# Patient Record
Sex: MALE | Race: WHITE | Employment: OTHER | URBAN - METROPOLITAN AREA
[De-identification: names, ages, dates, MRNs, and addresses within clinical notes are randomized per-mention and may not be internally consistent; named-entity substitution may affect disease eponyms.]

---

## 2019-02-27 ENCOUNTER — OFFICE VISIT (OUTPATIENT)
Dept: INTERNAL MEDICINE CLINIC | Age: 74
End: 2019-02-27

## 2019-02-27 VITALS
TEMPERATURE: 98.5 F | SYSTOLIC BLOOD PRESSURE: 123 MMHG | DIASTOLIC BLOOD PRESSURE: 77 MMHG | HEIGHT: 73 IN | WEIGHT: 190.13 LBS | BODY MASS INDEX: 25.2 KG/M2 | OXYGEN SATURATION: 96 % | RESPIRATION RATE: 18 BRPM | HEART RATE: 69 BPM

## 2019-02-27 DIAGNOSIS — I48.0 PAROXYSMAL ATRIAL FIBRILLATION (HCC): Primary | ICD-10-CM

## 2019-02-27 DIAGNOSIS — G60.9 IDIOPATHIC PERIPHERAL NEUROPATHY: ICD-10-CM

## 2019-02-27 DIAGNOSIS — Z98.890 HISTORY OF MITRAL VALVE REPAIR: ICD-10-CM

## 2019-02-27 DIAGNOSIS — M72.2 PLANTAR FASCIITIS: ICD-10-CM

## 2019-02-27 DIAGNOSIS — Z79.01 LONG TERM (CURRENT) USE OF ANTICOAGULANTS: ICD-10-CM

## 2019-02-27 DIAGNOSIS — Z86.69 HISTORY OF OBSTRUCTIVE SLEEP APNEA: ICD-10-CM

## 2019-02-27 DIAGNOSIS — F43.21 GRIEF REACTION: ICD-10-CM

## 2019-02-27 DIAGNOSIS — E78.00 HYPERCHOLESTEROLEMIA: ICD-10-CM

## 2019-02-27 DIAGNOSIS — Z12.5 SCREENING PSA (PROSTATE SPECIFIC ANTIGEN): ICD-10-CM

## 2019-02-27 DIAGNOSIS — E53.8 VITAMIN B12 DEFICIENCY: ICD-10-CM

## 2019-02-27 DIAGNOSIS — N40.0 ENLARGED PROSTATE: ICD-10-CM

## 2019-02-27 RX ORDER — TADALAFIL 20 MG/1
20 TABLET ORAL AS NEEDED
COMMUNITY
End: 2019-04-25 | Stop reason: SDUPTHER

## 2019-02-27 RX ORDER — ZINC GLUCONATE 10 MG
300 LOZENGE ORAL DAILY
COMMUNITY

## 2019-02-27 RX ORDER — CYANOCOBALAMIN 1000 UG/ML
1000 INJECTION, SOLUTION INTRAMUSCULAR; SUBCUTANEOUS
COMMUNITY
End: 2019-02-27 | Stop reason: SDUPTHER

## 2019-02-27 RX ORDER — CYANOCOBALAMIN 1000 UG/ML
1000 INJECTION, SOLUTION INTRAMUSCULAR; SUBCUTANEOUS
Qty: 10 ML | Refills: 4 | Status: SHIPPED | OUTPATIENT
Start: 2019-02-27 | End: 2019-04-25

## 2019-02-27 RX ORDER — APIXABAN 5 MG/1
5 TABLET, FILM COATED ORAL 2 TIMES DAILY
Refills: 2 | COMMUNITY
Start: 2019-01-03 | End: 2019-09-07 | Stop reason: SDUPTHER

## 2019-02-27 NOTE — PATIENT INSTRUCTIONS
Body Mass Index: Care Instructions Your Care Instructions Body mass index (BMI) can help you see if your weight is raising your risk for health problems. It uses a formula to compare how much you weigh with how tall you are. · A BMI lower than 18.5 is considered underweight. · A BMI between 18.5 and 24.9 is considered healthy. · A BMI between 25 and 29.9 is considered overweight. A BMI of 30 or higher is considered obese. If your BMI is in the normal range, it means that you have a lower risk for weight-related health problems. If your BMI is in the overweight or obese range, you may be at increased risk for weight-related health problems, such as high blood pressure, heart disease, stroke, arthritis or joint pain, and diabetes. If your BMI is in the underweight range, you may be at increased risk for health problems such as fatigue, lower protection (immunity) against illness, muscle loss, bone loss, hair loss, and hormone problems. BMI is just one measure of your risk for weight-related health problems. You may be at higher risk for health problems if you are not active, you eat an unhealthy diet, or you drink too much alcohol or use tobacco products. Follow-up care is a key part of your treatment and safety. Be sure to make and go to all appointments, and call your doctor if you are having problems. It's also a good idea to know your test results and keep a list of the medicines you take. How can you care for yourself at home? · Practice healthy eating habits. This includes eating plenty of fruits, vegetables, whole grains, lean protein, and low-fat dairy. · If your doctor recommends it, get more exercise. Walking is a good choice. Bit by bit, increase the amount you walk every day. Try for at least 30 minutes on most days of the week. · Do not smoke. Smoking can increase your risk for health problems.  If you need help quitting, talk to your doctor about stop-smoking programs and medicines. These can increase your chances of quitting for good. · Limit alcohol to 2 drinks a day for men and 1 drink a day for women. Too much alcohol can cause health problems. If you have a BMI higher than 25 · Your doctor may do other tests to check your risk for weight-related health problems. This may include measuring the distance around your waist. A waist measurement of more than 40 inches in men or 35 inches in women can increase the risk of weight-related health problems. · Talk with your doctor about steps you can take to stay healthy or improve your health. You may need to make lifestyle changes to lose weight and stay healthy, such as changing your diet and getting regular exercise. If you have a BMI lower than 18.5 · Your doctor may do other tests to check your risk for health problems. · Talk with your doctor about steps you can take to stay healthy or improve your health. You may need to make lifestyle changes to gain or maintain weight and stay healthy, such as getting more healthy foods in your diet and doing exercises to build muscle. Where can you learn more? Go to http://gareth-norman.info/. Enter S176 in the search box to learn more about \"Body Mass Index: Care Instructions. \" Current as of: October 13, 2016 Content Version: 11.4 © 2305-8645 [employer]wise[employer]. Care instructions adapted under license by [employer] (which disclaims liability or warranty for this information). If you have questions about a medical condition or this instruction, always ask your healthcare professional. [address] any warranty or liability for your use of this information.

## 2019-02-27 NOTE — PROGRESS NOTES
HISTORY OF PRESENT ILLNESS New patient to my practice, referred to me by self, internet. Prior medical care has been from Dr. Bonita Crane. Has 2 daughters, had 1 son (murdered 2017). 2nd wife Sneha Perkins. Hx [id number], then worked for [employer]. Then worked for [employer] and then [employer]. .  his  past medical history was reviewed, discussed, and summarized in the list below. Review of Systems All other systems reviewed and are negative, except as noted in HPI Past Medical and Surgical History Past Medical History:  
Diagnosis Date  Atrial fibrillation ([address] 07/29/2013 Holter 12/9/13  DDD (degenerative disc disease), lumbar  Diverticulosis  ED (erectile dysfunction) 04/08/2014  Enlarged prostate 2014  
 psa 1.02 9/2017  Grief reaction   
 son was murdered 2017  History of mitral valve repair 2013  
 echo 11/2017 EF 50-55% mildly increased valvue pressure 11/2017  History of obstructive sleep apnea   
 states resolved after mitral replacement 2013  Hypercholesterolemia 04/08/2014  Hyperplastic colon polyp 2013  Idiopathic peripheral neuropathy   
 did not tolerate lyrica, cymbalta. tried Nutrix  Long term (current) use of anticoagulants  Plantar fasciitis  Vitamin B12 deficiency   
 injections  
 
 
 has a past surgical history that includes hx orthopaedic (2009); hx mitral valvuloplasty (2013); hx colonoscopy (04/03/2013); hx lumbar fusion (1984); hx colonoscopy (04/30/2018); and hx heart catheterization (03/05/2013). Current Outpatient Medications Medication Sig  ELIQUIS 5 mg tablet Take 5 mg by mouth two (2) times a day.  B-complex with vitamin C (BEC-ZINC PO) Take  by mouth daily.  magnesium 250 mg tab Take 300 mg by mouth daily.  tadalafil (CIALIS) 20 mg tablet Take 20 mg by mouth as needed.   
 cyanocobalamin (VITAMIN B12) 1,000 mcg/mL injection 1 mL by IntraMUSCular route every thirty (30) days. Please dispense 10 mg vial  
 
No current facility-administered medications for this visit. reports that  has never smoked. he has never used smokeless tobacco. He reports that he drinks about 4.2 oz of alcohol per week. He reports that he does not use drugs. family history is not on file. Physical Exam  
Nursing note and vitals reviewed. Blood pressure 123/77, pulse 69, temperature 98.5 °F (36.9 °C), temperature source Oral, resp. rate 18, height 6' 1\" (1.854 m), weight 190 lb 2 oz (86.2 kg), SpO2 96 %. Constitutional: oriented to person, place, and time. No distress. Eyes: Conjunctivae are normal.  
HEENT:  No cervical lymphadenopathy. No thyroid nodules or goiter Cardiovascular: Normal rate. Regular rhythm, no murmurs, rubs. No edema Pulmonary/Chest: Effort normal. clear to auscultation Abdominal: soft, non-tender, non-distended Musculoskeletal:    
Neurological: Alert and oriented to person, place. Cranial nerves grossly intact. Normal gait Skin: No rash noted. Psychiatric: Normal mood and affect. Behavior is normal.  
 
ASSESSMENT and PLAN Diagnoses and all orders for this visit: 1. Paroxysmal atrial fibrillation (HCC) Records from cardiology to be scanned in [facility]. Doing well. -     CBC WITH AUTOMATED DIFF 
-     METABOLIC PANEL, COMPREHENSIVE 
-     REFERRAL TO CARDIOLOGY 2. Idiopathic peripheral neuropathy 
-     REFERRAL TO PODIATRY 3. Hypercholesterolemia Controlled on current regimen. Continue current medications as written in chart. 
-     LIPID PANEL 4. Enlarged prostate Mild s/s 5. Grief reaction Loss of son 2 years ago. Has supportive family 6. History of mitral valve repair - last echo 2017 showed worsening gradient. Currently asymptomatic 
-     REFERRAL TO CARDIOLOGY 7. Long term (current) use of anticoagulants 8. History of obstructive sleep apnea - may have mild residual s/s. Consider repeat sleep study 9. Vitamin B12 deficiency - tolerating injections. -     VITAMIN B12 & FOLATE 
-     cyanocobalamin (VITAMIN B12) 1,000 mcg/mL injection; 1 mL by IntraMUSCular route every thirty (30) days. Please dispense 10 mg vial 
 
10. Screening PSA (prostate specific antigen) -     PSA W/ REFLX FREE PSA 11. Plantar fasciitis 
-     REFERRAL TO PODIATRY There are no Patient Instructions on file for this visit. 
 
lab results and schedule of future lab studies reviewed with patient 
reviewed medications and side effects in detail Follow-up Disposition: Not on File Discussed the patient's BMI with him. The BMI follow up plan is as follows:  
 
dietary management education, guidance, and counseling 
encourage exercise 
monitor weight 
prescribed dietary intake An After Visit Summary was printed and given to the patient.

## 2019-03-01 ENCOUNTER — HOSPITAL ENCOUNTER (OUTPATIENT)
Dept: LAB | Age: 74
Discharge: HOME | End: 2019-03-01
Payer: MEDICARE

## 2019-03-01 PROCEDURE — 80053 COMPREHEN METABOLIC PANEL: CPT

## 2019-03-01 PROCEDURE — 36415 COLL VENOUS BLD VENIPUNCTURE: CPT

## 2019-03-01 PROCEDURE — 82607 VITAMIN B-12: CPT

## 2019-03-01 PROCEDURE — 85025 COMPLETE CBC W/AUTO DIFF WBC: CPT

## 2019-03-01 PROCEDURE — 80061 LIPID PANEL: CPT

## 2019-03-01 PROCEDURE — 84153 ASSAY OF PSA TOTAL: CPT

## 2019-03-02 LAB
ALBUMIN SERPL-MCNC: 4.4 G/DL (ref 3.5–4.8)
ALBUMIN/GLOB SERPL: 1.9 {RATIO} (ref 1.2–2.2)
ALP SERPL-CCNC: 76 IU/L (ref 39–117)
ALT SERPL-CCNC: 22 IU/L (ref 0–44)
AST SERPL-CCNC: 29 IU/L (ref 0–40)
BASOPHILS # BLD AUTO: 0.1 X10E3/UL (ref 0–0.2)
BASOPHILS NFR BLD AUTO: 1 %
BILIRUB SERPL-MCNC: 0.8 MG/DL (ref 0–1.2)
BUN SERPL-MCNC: 20 MG/DL (ref 8–27)
BUN/CREAT SERPL: 23 (ref 10–24)
CALCIUM SERPL-MCNC: 9.1 MG/DL (ref 8.6–10.2)
CHLORIDE SERPL-SCNC: 105 MMOL/L (ref 96–106)
CHOLEST SERPL-MCNC: 159 MG/DL (ref 100–199)
CO2 SERPL-SCNC: 22 MMOL/L (ref 20–29)
CREAT SERPL-MCNC: 0.86 MG/DL (ref 0.76–1.27)
EOSINOPHIL # BLD AUTO: 0.1 X10E3/UL (ref 0–0.4)
EOSINOPHIL NFR BLD AUTO: 2 %
ERYTHROCYTE [DISTWIDTH] IN BLOOD BY AUTOMATED COUNT: 14 % (ref 12.3–15.4)
FOLATE SERPL-MCNC: >20 NG/ML
GLOBULIN SER CALC-MCNC: 2.3 G/DL (ref 1.5–4.5)
GLUCOSE SERPL-MCNC: 97 MG/DL (ref 65–99)
HCT VFR BLD AUTO: 39.2 % (ref 37.5–51)
HDLC SERPL-MCNC: 55 MG/DL
HGB BLD-MCNC: 13.2 G/DL (ref 13–17.7)
IMM GRANULOCYTES # BLD AUTO: 0 X10E3/UL (ref 0–0.1)
IMM GRANULOCYTES NFR BLD AUTO: 0 %
INTERPRETATION, 910389: NORMAL
LDLC SERPL CALC-MCNC: 94 MG/DL (ref 0–99)
LYMPHOCYTES # BLD AUTO: 1.2 X10E3/UL (ref 0.7–3.1)
LYMPHOCYTES NFR BLD AUTO: 23 %
MCH RBC QN AUTO: 32 PG (ref 26.6–33)
MCHC RBC AUTO-ENTMCNC: 33.7 G/DL (ref 31.5–35.7)
MCV RBC AUTO: 95 FL (ref 79–97)
MONOCYTES # BLD AUTO: 0.4 X10E3/UL (ref 0.1–0.9)
MONOCYTES NFR BLD AUTO: 8 %
NEUTROPHILS # BLD AUTO: 3.4 X10E3/UL (ref 1.4–7)
NEUTROPHILS NFR BLD AUTO: 66 %
PLATELET # BLD AUTO: 183 X10E3/UL (ref 150–379)
POTASSIUM SERPL-SCNC: 4.5 MMOL/L (ref 3.5–5.2)
PROT SERPL-MCNC: 6.7 G/DL (ref 6–8.5)
PSA SERPL-MCNC: 1.1 NG/ML (ref 0–4)
RBC # BLD AUTO: 4.13 X10E6/UL (ref 4.14–5.8)
REFLEX CRITERIA: NORMAL
SODIUM SERPL-SCNC: 144 MMOL/L (ref 134–144)
TRIGL SERPL-MCNC: 49 MG/DL (ref 0–149)
VIT B12 SERPL-MCNC: 879 PG/ML (ref 232–1245)
VLDLC SERPL CALC-MCNC: 10 MG/DL (ref 5–40)
WBC # BLD AUTO: 5.1 X10E3/UL (ref 3.4–10.8)

## 2019-03-19 ENCOUNTER — OFFICE VISIT (OUTPATIENT)
Dept: CARDIOLOGY CLINIC | Age: 74
End: 2019-03-19

## 2019-03-19 VITALS
HEART RATE: 70 BPM | OXYGEN SATURATION: 98 % | RESPIRATION RATE: 17 BRPM | HEIGHT: 73 IN | BODY MASS INDEX: 25.34 KG/M2 | SYSTOLIC BLOOD PRESSURE: 130 MMHG | WEIGHT: 191.2 LBS | DIASTOLIC BLOOD PRESSURE: 80 MMHG

## 2019-03-19 DIAGNOSIS — N52.9 ERECTILE DYSFUNCTION, UNSPECIFIED ERECTILE DYSFUNCTION TYPE: ICD-10-CM

## 2019-03-19 DIAGNOSIS — E78.00 HYPERCHOLESTEROLEMIA: ICD-10-CM

## 2019-03-19 DIAGNOSIS — I48.20 CHRONIC ATRIAL FIBRILLATION (HCC): Primary | ICD-10-CM

## 2019-03-19 DIAGNOSIS — I34.1 MITRAL VALVE PROLAPSE: ICD-10-CM

## 2019-03-19 DIAGNOSIS — I34.0 NON-RHEUMATIC MITRAL REGURGITATION: ICD-10-CM

## 2019-03-19 NOTE — PROGRESS NOTES
Visit Vitals /80 (BP 1 Location: Right arm, BP Patient Position: Sitting) Pulse 70 Resp 17 Ht 6' 1\" (1.854 m) Wt 191 lb 3.2 oz (86.7 kg) SpO2 98% BMI 25.23 kg/m²

## 2019-03-19 NOTE — PROGRESS NOTES
Chief Complaint Patient presents with  New Patient [address] [place] [address] 1. Have you been to the ER, urgent care clinic since your last visit? Hospitalized since your last visit? No 
 
2. Have you seen or consulted any other health care providers outside of the [address] [place] [address] since your last visit? Include any pap smears or colon screening. No 
 
3) Do you have an Advance Directive on file? no 
 
Patient is accompanied by self I have received verbal consent from Ciro Dewey to discuss any/all medical information while they are present in the room.

## 2019-03-19 NOTE — PROGRESS NOTES
Swapnil Carter     1945       Alba Harrison MD, [facility] Date of Visit-3/19/2019 PCP is Thierry Lazaro MD  
[address] [place] [address] [facility] [place] HPI:  Swapnil Carter is a 68 y.o. male Saw Caroline Lao MD on 2-27-19 with hx of AFib, , normal ef in 2017 Prior MV repair Has records today Subjective:  Previously taken care of in [place], [place] by [address] [facility], saw Dr. Lencho Huber. Visit from 09/14/16 indicates patient has chronic atrial fibrillation with moderate MR and history of mitral valve repair. He had a TERI, mitral  leaflets with severe posterior mitral valve prolapse and probably flail middle scallop with moderate to severe eccentric MR and moderate TR. The left atrium was moderately dilated as of 03/05/13. He then underwent surgery on 04/15/13 by Dr. Gabby Ballard. He had a mitral valve repair with a #32 [address] [place] with a right quadrangular repair of posterior leaflet. He had a follow up echo 08/06/13, which showed an EF of 45%, mild cardiomyopathy, normal mitral valve ring. Follow up echo 09/16/? showed an EF 50-55%, mild MR and annuloplasty was intact. He has chronic atrial fibrillation. Holter monitor showed frequent PVCs 03/07/16. He is now seeing Dr. Cristian Meadows as his primary physician. There are further notes from Dr. Rosana Johnson up through 09/20/17 office visit. Patient actually lived in [place], [place] and got his care there, in addition to the center based in [place]. He was living in [place] until his son passed. He has recently gotten the shingles vaccine and flu vaccine. EKG: afib at 62 with Left axis deviation Assessment/Plan: 1. Chronic atrial fibrillation. We have discussed Eliquis. He continues. I agree with this choice. His rate is well controlled as a result of the mitral valve disease.  
2. Mitral valve regurgitation with mitral valve prolapse, status post repair. Will check echo to serve as baseline. Remains asymptomatic. 3. Dyslipidemia, not currently on statin. Will check lipids 4. ED, on Cialis. Plan:  Very pleasant gentleman. It has been a pleasure to meet him today. We had a scott conversation. He says he had a stress test before and his LDL was 94, so I am comfortable with him not being on a statin at this time. His main risk factor is age. He does not recall having any cath prior to repair, just a stress test.  I will see him in six months. Call with results of echo due this month and we will probably see him on a yearly basis. There is a notation in the surgical history that he may have normal coronaries, so I will look further in the records to confirm that. That will also be another argument that he does not necessarily have to start a statin with that LDL value, especially as he is physically active, working on farm, [employer]. Does very high level of physical activity without angina or chest pain. Future Appointments Date Time Provider Tejas Garrett 4/25/2019 10:30 AM Elva Lazaro MD [address] [id number]  
9/26/2019  1:00 PM Crissy Mccall MD [facility] Patient Instructions You will be scheduled for an echocardiogram and a 6 month follow up with Dr. Chet Gonzalez.  
  
Key CAD CHF Meds ELIQUIS 5 mg tablet (Taking) Take 5 mg by mouth two (2) times a day. Medical History:  Other medical history includes BPH, history of  pneumonia, neuropathy, dyslipidemia, ED, BPH and sleep apnea. Past Surgical History:  Surgical history includes back surgery and shoulder surgery in [place] and then the mitral valve annuloplasty and colonoscopies. Social History:  Never smoked. Drinks one drink a night Moved to [place] as his son was murdered in the city and he wanted to be closer to family. He has a daughter in [place] and two daughters here in [place]. Impression: 1. Chronic atrial fibrillation ([facility] 2. Non-rheumatic mitral regurgitation 3. Mitral valve prolapse 4. Hypercholesterolemia 5. Erectile dysfunction, unspecified erectile dysfunction type Cardiac History: No specialty comments available. 12 System ROS:  He was negative for all findings except for those noted above. ROS-except as noted above. . A complete cardiac and respiratory are reviewed and negative except as above ; Resp-denies wheezing  or productive cough,. Const- No unusual weight loss or fever; Neuro-no recent seizure or CVA ; GI- No BRBPR, abdom pain, bloating ; - no  hematuria  
see supplement sheet, initialed and to be scanned by staff Past Medical History:  
Diagnosis Date  Atrial fibrillation ([facility] 07/29/2013 Holter 12/9/13  DDD (degenerative disc disease), lumbar  Diverticulosis  ED (erectile dysfunction) 04/08/2014  Enlarged prostate 2014  
 psa 1.02 9/2017  Grief reaction   
 son was murdered 2017  History of mitral valve repair 2013  
 echo 11/2017 EF 50-55% mildly increased valvue pressure 11/2017  History of obstructive sleep apnea   
 states resolved after mitral replacement 2013  Hypercholesterolemia 04/08/2014  Hyperplastic colon polyp 2013  Idiopathic peripheral neuropathy   
 did not tolerate lyrica, cymbalta. tried Nutrix  Long term (current) use of anticoagulants  Plantar fasciitis  Vitamin B12 deficiency   
 injections Social Hx= reports that  has never smoked. he has never used smokeless tobacco. He reports that he drinks about 4.2 oz of alcohol per week. He reports that he does not use drugs. Exam and Labs:   
Visit Vitals /80 (BP 1 Location: Right arm, BP Patient Position: Sitting) Pulse 70 Resp 17 Ht 6' 1\" (1.854 m) Wt 191 lb 3.2 oz (86.7 kg) SpO2 98% BMI 25.23 kg/m² @Constitutional:  NAD, comfortable Head: NC,AT. Eyes: No scleral icterus. Neck:  Neck supple.  No JVD present. Throat: moist mucous membranes. Chest: Effort normal & normal respiratory excursion . Neurological: alert, conversant and oriented . Skin: Skin is not cold. No obvious systemic rash noted. Not diaphoretic. No erythema. Psychiatric:  Grossly normal mood and affect. Behavior appears normal. Extremities:  no clubbing or cyanosis. Abdomen: non distended Lungs:breath sounds normal. No stridor. distress, wheezes or  Rales. Heart:    no murmurs noted, no gallops noted, irregularly irregular rhythm with rate 62, no JVD , PMI non displaced. Edema: Edema is none. Lab Results Component Value Date/Time Cholesterol, total 159 03/01/2019 08:56 AM  
 HDL Cholesterol 55 03/01/2019 08:56 AM  
 LDL, calculated 94 03/01/2019 08:56 AM  
 Triglyceride 49 03/01/2019 08:56 AM  
 
Lab Results Component Value Date/Time Sodium 144 03/01/2019 08:56 AM  
 Potassium 4.5 03/01/2019 08:56 AM  
 Chloride 105 03/01/2019 08:56 AM  
 CO2 22 03/01/2019 08:56 AM  
 Glucose 97 03/01/2019 08:56 AM  
 BUN 20 03/01/2019 08:56 AM  
 Creatinine 0.86 03/01/2019 08:56 AM  
 BUN/Creatinine ratio 23 03/01/2019 08:56 AM  
 GFR est  03/01/2019 08:56 AM  
 GFR est non-AA 87 03/01/2019 08:56 AM  
 Calcium 9.1 03/01/2019 08:56 AM  
  
Wt Readings from Last 3 Encounters:  
03/19/19 191 lb 3.2 oz (86.7 kg) 02/27/19 190 lb 2 oz (86.2 kg) BP Readings from Last 3 Encounters:  
03/19/19 130/80  
02/27/19 123/77 Current Outpatient Medications Medication Sig  ELIQUIS 5 mg tablet Take 5 mg by mouth two (2) times a day.  B-complex with vitamin C (BEC-ZINC PO) Take  by mouth daily.  magnesium 250 mg tab Take 300 mg by mouth daily.  tadalafil (CIALIS) 20 mg tablet Take 20 mg by mouth as needed.  cyanocobalamin (VITAMIN B12) 1,000 mcg/mL injection 1 mL by IntraMUSCular route every thirty (30) days. Please dispense 10 mg vial  
 
No current facility-administered medications for this visit. Impression see above.

## 2019-03-19 NOTE — PROGRESS NOTES
Chief Complaint Patient presents with  New Patient [facility] 1. Have you been to the ER, urgent care clinic since your last visit? Hospitalized since your last visit? No 
 
2. Have you seen or consulted any other health care providers outside of the [address] [place] [address] since your last visit? Include any pap smears or colon screening. No 
 
4) Are you interested in receiving information on Advance Directives? NO Patient is accompanied by self I have received verbal consent from Chuck Miller to discuss any/all medical information while they are present in the room.

## 2019-04-04 ENCOUNTER — TELEPHONE (OUTPATIENT)
Dept: CARDIOLOGY CLINIC | Age: 74
End: 2019-04-04

## 2019-04-04 NOTE — TELEPHONE ENCOUNTER
Two patient identifiers verified. Per MD patient called and given results of ECHO. Confirmed follow up appointment. Patient verbalized understanding and denies any further questions or concerns at this time.      ----- Message from Edgardo Zapata MD sent at 4/4/2019  9:48 AM EDT -----  Echo looks fine  Nurse to call pt for test result   Minor regurg  Valve repair in good shape  Has good LV fx  Future Appointments  4/25/2019  10:30 AM   Stefano Lazaro MD           [address] [place] [address]  9/26/2019  1:00 PM    Brigitte Sanders MD       [address] [address]

## 2019-04-25 ENCOUNTER — OFFICE VISIT (OUTPATIENT)
Dept: INTERNAL MEDICINE CLINIC | Age: 74
End: 2019-04-25

## 2019-04-25 VITALS
RESPIRATION RATE: 18 BRPM | HEART RATE: 51 BPM | HEIGHT: 73 IN | SYSTOLIC BLOOD PRESSURE: 131 MMHG | DIASTOLIC BLOOD PRESSURE: 79 MMHG | OXYGEN SATURATION: 96 % | BODY MASS INDEX: 25.99 KG/M2 | TEMPERATURE: 98.5 F | WEIGHT: 196.13 LBS

## 2019-04-25 DIAGNOSIS — E53.8 VITAMIN B12 DEFICIENCY: ICD-10-CM

## 2019-04-25 DIAGNOSIS — Z00.00 MEDICARE ANNUAL WELLNESS VISIT, SUBSEQUENT: Primary | ICD-10-CM

## 2019-04-25 DIAGNOSIS — N52.9 ERECTILE DYSFUNCTION, UNSPECIFIED ERECTILE DYSFUNCTION TYPE: ICD-10-CM

## 2019-04-25 DIAGNOSIS — Z13.31 SCREENING FOR DEPRESSION: ICD-10-CM

## 2019-04-25 DIAGNOSIS — Z71.89 ADVANCED DIRECTIVES, COUNSELING/DISCUSSION: ICD-10-CM

## 2019-04-25 DIAGNOSIS — Z13.39 SCREENING FOR ALCOHOLISM: ICD-10-CM

## 2019-04-25 DIAGNOSIS — F43.9 SITUATIONAL STRESS: ICD-10-CM

## 2019-04-25 RX ORDER — ALPHA LIPOIC ACID 300 MG
CAPSULE ORAL
Refills: 0 | COMMUNITY
Start: 2019-04-10 | End: 2020-04-16 | Stop reason: ALTCHOICE

## 2019-04-25 RX ORDER — TADALAFIL 20 MG/1
20 TABLET ORAL AS NEEDED
Qty: 10 TAB | Refills: 5 | Status: SHIPPED | OUTPATIENT
Start: 2019-04-25

## 2019-04-25 RX ORDER — CYANOCOBALAMIN 1000 UG/ML
1000 INJECTION, SOLUTION INTRAMUSCULAR; SUBCUTANEOUS
Qty: 10 ML | Refills: 4 | Status: CANCELLED
Start: 2019-04-25

## 2019-04-25 RX ORDER — CYANOCOBALAMIN 1000 UG/ML
1000 INJECTION, SOLUTION INTRAMUSCULAR; SUBCUTANEOUS
Qty: 10 ML | Refills: 11 | Status: SHIPPED | OUTPATIENT
Start: 2019-04-25 | End: 2020-08-23

## 2019-04-25 NOTE — PROGRESS NOTES
This is the Subsequent Medicare Annual Wellness Exam, performed 12 months or more after the Initial AWV or the last Subsequent AWV I have reviewed the patient's medical history in detail and updated the computerized patient record. History Past Medical History:  
Diagnosis Date  Atrial fibrillation ([address] 07/29/2013 Holter 12/9/13  DDD (degenerative disc disease), lumbar  Diverticulosis  ED (erectile dysfunction) 04/08/2014  Enlarged prostate 2014  
 psa 1.02 9/2017  Grief reaction   
 son was murdered 2017  History of mitral valve repair 2013  
 echo 11/2017 EF 50-55% mildly increased valvue pressure 11/2017  History of obstructive sleep apnea   
 states resolved after mitral replacement 2013  Hypercholesterolemia 04/08/2014  Hyperplastic colon polyp 2013  Idiopathic peripheral neuropathy   
 did not tolerate lyrica, cymbalta. tried Nutrix  Long term (current) use of anticoagulants  Plantar fasciitis  Vitamin B12 deficiency   
 injections Past Surgical History:  
Procedure Laterality Date  HX COLONOSCOPY  04/03/2013  
 2 hyperplastic polyps  HX COLONOSCOPY  04/30/2018 2 polyps. Dr. Jeb Rey  HX HEART CATHETERIZATION  03/05/2013 [place].  normal coronaries, severe MR  
 HX LUMBAR FUSION  1984 L5-S1  
 HX MITRAL VALVULOPLASTY  2013 Mitral Valve Annuloplasty  HX ORTHOPAEDIC  2009 Shoulder Current Outpatient Medications Medication Sig Dispense Refill  Alpha Lipoic Acid 300 mg cap TK UTD  0  
 tadalafil (CIALIS) 20 mg tablet Take 1 Tab by mouth as needed (ED). 10 Tab 5  cyanocobalamin (VITAMIN B12) 1,000 mcg/mL injection 1 mL by IntraMUSCular route every thirty (30) days. Please dispense 10 mg vial  Indications: inadequate vitamin B12 10 mL 11  
 ELIQUIS 5 mg tablet Take 5 mg by mouth two (2) times a day. 2  
 B-complex with vitamin C (BEC-ZINC PO) Take  by mouth daily.  magnesium 250 mg tab Take 300 mg by mouth daily. Allergies Allergen Reactions  Beta Blocker [Beta-Blockers (Beta-Adrenergic Blocking Agts)] Nausea and Vomiting No family history on file. Social History Tobacco Use  Smoking status: Never Smoker  Smokeless tobacco: Never Used Substance Use Topics  Alcohol use: Yes Alcohol/week: 4.2 oz Types: 7 Shots of liquor per week Patient Active Problem List  
Diagnosis Code  Atrial fibrillation (HCC) I48.91  
 DDD (degenerative disc disease), lumbar M51.36  
 Diverticulosis K57.90  
 ED (erectile dysfunction) N52.9  Enlarged prostate N40.0  Grief reaction F43.21  
 History of mitral valve repair Z98.890  
 History of obstructive sleep apnea Z86.69  
 Hypercholesterolemia E78.00  Hyperplastic colon polyp K63.5  Idiopathic peripheral neuropathy G60.9  Long term (current) use of anticoagulants Z79.01  
 Plantar fasciitis M72.2  Vitamin B12 deficiency E53.8 Depression Risk Factor Screening:  
 
3 most recent PHQ Screens 2/27/2019 Little interest or pleasure in doing things Not at all Feeling down, depressed, irritable, or hopeless Several days Total Score PHQ 2 1 Alcohol Risk Factor Screening: You do not drink alcohol or very rarely. Functional Ability and Level of Safety:  
Hearing Loss Hearing is good. Activities of Daily Living The home contains: no safety equipment. Patient does total self care Fall Risk Fall Risk Assessment, last 12 mths 4/25/2019 Able to walk? Yes Fall in past 12 months? No  
 
 
Abuse Screen Patient is not abused Cognitive Screening Evaluation of Cognitive Function: 
Has your family/caregiver stated any concerns about your memory: no 
Normal 
 
Patient Care Team  
Patient Care Team: 
Veronica Monreal MD as PCP - General (Internal Medicine) Assessment/Plan Education and counseling provided: 
Are appropriate based on today's review and evaluation Diagnoses and all orders for this visit: 
 
1. Medicare annual wellness visit, subsequent 2. Advanced directives, counseling/discussion 3. Screening for alcoholism 4. Screening for depression 
-     Angelalandanalisa Simmons 5. Vitamin B12 deficiency Controlled on current regimen. Continue current medications as written in chart. -     cyanocobalamin (VITAMIN B12) 1,000 mcg/mL injection; 1 mL by IntraMUSCular route every thirty (30) days. Please dispense 10 mg vial  Indications: inadequate vitamin B12 
 
6. Situational stress- son was murdered in 2017. Stress re: this and his relationship w wife -     REFERRAL TO PSYCHOLOGY 7. Erectile dysfunction, unspecified erectile dysfunction type 
-     tadalafil (CIALIS) 20 mg tablet; Take 1 Tab by mouth as needed (ED). Health Maintenance Due Topic Date Due  GLAUCOMA SCREENING Q2Y  04/22/2011

## 2019-04-25 NOTE — ACP (ADVANCE CARE PLANNING)
Advance Care Planning (ACP) Provider Note - Comprehensive     Date of ACP Conversation: 04/27/19  Persons included in Conversation:  patient  Length of ACP Conversation in minutes:  <16 minutes (Non-Billable)    Authorized Decision Maker (if patient is incapable of making informed decisions): This person is:  Healthcare Agent/Medical Power of  under Advance Directive          General ACP for ALL Patients with Decision Making Capacity:   Importance of advance care planning, including choosing a healthcare agent to communicate patient's healthcare decisions if patient lost the ability to make decisions, such as after a sudden illness or accident  Understanding of the healthcare agent role was assessed and information provided  Exploration of values, goals, and preferences if recovery is not expected, even with continued medical treatment in the event of: Imminent death  Severe, permanent brain injury    Review of Existing Advance Directive:  not availble     For Serious or Chronic Illness:  Understanding of medical condition    Understanding of CPR, goals and expected outcomes, benefits and burdens discussed. Information on CPR success rates provided (e.g. for CPR in hospital, survival to d/c at two weeks is 22%, for chronically ill or elderly/frail survival is less than 3%); Individual asked to communicate understanding of information in his/her own words.   Explored fears and concerns regarding CPR or possible outcomes    Interventions Provided:  Recommended completion of Advance Directive form after review of ACP materials and conversation with prospective healthcare agent   Recommended communicating the plan and making copies for the healthcare agent, personal physician, and others as appropriate (e.g., health system)

## 2019-04-25 NOTE — PATIENT INSTRUCTIONS
Medicare Wellness Visit, Male The best way to live healthy is to have a lifestyle where you eat a well-balanced diet, exercise regularly, limit alcohol use, and quit all forms of tobacco/nicotine, if applicable. Regular preventive services are another way to keep healthy. Preventive services (vaccines, screening tests, monitoring & exams) can help personalize your care plan, which helps you manage your own care. Screening tests can find health problems at the earliest stages, when they are easiest to treat. [facility] follows the current, evidence-based guidelines published by the [facility] Armando Deborah ([facility]) when recommending preventive services for our patients. Because we follow these guidelines, sometimes recommendations change over time as research supports it. (For example, a prostate screening blood test is no longer routinely recommended for men with no symptoms.) Of course, you and your doctor may decide to screen more often for some diseases, based on your risk and co-morbidities (chronic disease you are already diagnosed with). Preventive services for you include: - Medicare offers their members a free annual wellness visit, which is time for you and your primary care provider to discuss and plan for your preventive service needs. Take advantage of this benefit every year! 
-All adults over age 72 should receive the recommended pneumonia vaccines. Current USPSTF guidelines recommend a series of two vaccines for the best pneumonia protection.  
-All adults should have a flu vaccine yearly and an ECG.  All adults age 61 and older should receive a shingles vaccine once in their lifetime.   
-All adults age 38-68 who are overweight should have a diabetes screening test once every three years.  
-Other screening tests & preventive services for persons with diabetes include: an eye exam to screen for diabetic retinopathy, a kidney function test, a foot exam, and stricter control over your cholesterol.  
-Cardiovascular screening for adults with routine risk involves an electrocardiogram (ECG) at intervals determined by the provider.  
-Colorectal cancer screening should be done for adults age 54-65 with no increased risk factors for colorectal cancer. There are a number of acceptable methods of screening for this type of cancer. Each test has its own benefits and drawbacks. Discuss with your provider what is most appropriate for you during your annual wellness visit. The different tests include: colonoscopy (considered the best screening method), a fecal occult blood test, a fecal DNA test, and sigmoidoscopy. 
-All adults born between [facility] should be screened once for Hepatitis C. 
-An Abdominal Aortic Aneurysm (AAA) Screening is recommended for men age 73-68 who has ever smoked in their lifetime. Here is a list of your current Health Maintenance items (your personalized list of preventive services) with a due date: 
Health Maintenance Due Topic Date Due  Glaucoma Screening   04/22/2011

## 2019-07-12 ENCOUNTER — TELEPHONE (OUTPATIENT)
Dept: CARDIOLOGY CLINIC | Age: 74
End: 2019-07-12

## 2019-07-12 NOTE — TELEPHONE ENCOUNTER
Verified patient with two types of identifiers. Patient reports heavy lifting and strenuous activity yesterday. He then reports that last night around midnight he had upper back pain from shoulder to shoulder as well as chest pain when taking deep breaths. He reports all symptoms have since subsided. Informed him per Dr. Ovidio Lugo to go to the ER if pain returns, to stop any strenuous activity and to follow up with Dr. Parvez Lambert next week. Patient verbalized understanding and will call with any other questions.

## 2019-07-15 ENCOUNTER — HOSPITAL ENCOUNTER (OUTPATIENT)
Dept: LAB | Age: 74
Discharge: HOME | End: 2019-07-15
Payer: MEDICARE

## 2019-07-15 ENCOUNTER — OFFICE VISIT (OUTPATIENT)
Dept: CARDIOLOGY CLINIC | Age: 74
End: 2019-07-15

## 2019-07-15 VITALS
WEIGHT: 193 LBS | DIASTOLIC BLOOD PRESSURE: 82 MMHG | OXYGEN SATURATION: 96 % | HEART RATE: 70 BPM | BODY MASS INDEX: 25.58 KG/M2 | HEIGHT: 73 IN | SYSTOLIC BLOOD PRESSURE: 120 MMHG | RESPIRATION RATE: 14 BRPM

## 2019-07-15 DIAGNOSIS — I34.0 NON-RHEUMATIC MITRAL REGURGITATION: ICD-10-CM

## 2019-07-15 DIAGNOSIS — I48.20 CHRONIC ATRIAL FIBRILLATION (HCC): ICD-10-CM

## 2019-07-15 DIAGNOSIS — E78.00 HYPERCHOLESTEROLEMIA: ICD-10-CM

## 2019-07-15 DIAGNOSIS — I42.8 VALVULAR CARDIOMYOPATHY (HCC): ICD-10-CM

## 2019-07-15 DIAGNOSIS — Z98.890 S/P MVR (MITRAL VALVE REPAIR): ICD-10-CM

## 2019-07-15 DIAGNOSIS — R07.2 SUBSTERNAL CHEST PAIN: Primary | ICD-10-CM

## 2019-07-15 DIAGNOSIS — I34.1 MITRAL VALVE PROLAPSE: ICD-10-CM

## 2019-07-15 DIAGNOSIS — R07.89 CHEST TIGHTNESS: ICD-10-CM

## 2019-07-15 PROCEDURE — 80178 ASSAY OF LITHIUM: CPT

## 2019-07-15 PROCEDURE — 36415 COLL VENOUS BLD VENIPUNCTURE: CPT

## 2019-07-15 NOTE — PROGRESS NOTES
Gillian Acosta     1945       Alba Veloz MD, [facility] [place]  Date of Visit-7/15/2019   PCP is Kyaw Bowman MD   [facility] [place]  [facility]  HPI:  Gillian Acosta is a 69 y.o. male   Four month f/u for chronic a-fib and he has prior mitral valve repair. Previously taken care of in [place], [place] by [address] [place] [address], saw Dr. Archie Hood. Visit from 16 indicates patient has chronic atrial fibrillation with moderate MR and history of mitral valve repair. He had a TERI, mitral  leaflets with severe posterior mitral valve prolapse and probably flail middle scallop with moderate to severe eccentric MR and moderate TR. The left atrium was moderately dilated as of 13. He then underwent surgery on 04/15/13 by Dr. Steffanie Pierre. He had a mitral valve repair with a #32 [place] with a right quadrangular repair of posterior leaflet. He had a follow up echo 13, which showed an EF of 45%, mild cardiomyopathy, normal mitral valve ring. Follow up echo  showed an EF 50-55%, mild MR and annuloplasty was intact. He has chronic atrial fibrillation. Holter monitor showed frequent PVCs 16. On Thursday night he felt as if \"someone put a band around his chest and back\". Reports having slight SOB that night. The feeling lasted from 11:35 AM to 2:00 PM. He mentions similarity of that feeling to what he felt in . By Saturday, he was feeling normal again. He went digging a ditch over the weekend, without any chest pain but profuse sweating. He notes having muscle cramps in his lower extremities. Family Hx: father  at 80 due to head trauma. Denies edema, syncope. Has no tachycardia , palpitations or sense of arrythmia     EKG 7/15/2019:    Atrial fibrillation  - occasional ectopic ventricular beat     -Left axis -anterior fascicular block.    -Possible old anteroseptal infarct. Assessment/Plan:     1.  Substernal chest pain  Episode of chest tightness. I will get a stress test and troponin.   - TROPONIN I  - NUCLEAR CARDIAC STRESS TEST; Future    2. Chronic atrial fibrillation (HCC)  No palps, tachy, on Eliquis    3. Chest tightness-see above  4. Non-rheumatic mitral regurgitation due to MVP  5. Mitral valve prolapse-compensated    6. Hypercholesterolemia  Lab Results   Component Value Date/Time    LDL, calculated 94 03/01/2019 08:56 AM      7. S/P MVR (mitral valve repair)  8. Valvular cardiomyopathy (HCC)-note reduced EF  Future Appointments   Date Time Provider Tejas Garrett   9/26/2019  1:00 PM Melodye Cogan, MD [facility]   10/22/2019 10:30 AM Gutierrez Lazaro Ala, MD [facility]             ELIQUIS 5 mg tablet (Taking) Take 5 mg by mouth two (2) times a day. Impression:   1. Substernal chest pain    2. Chronic atrial fibrillation (HCC)    3. Chest tightness    4. Non-rheumatic mitral regurgitation    5. Mitral valve prolapse    6. Hypercholesterolemia    7. S/P MVR (mitral valve repair)    8. Valvular cardiomyopathy [facility])       Cardiac History:   No specialty comments available. ROS-except as noted above. . A complete cardiac and respiratory are reviewed and negative except as above ; Resp-denies wheezing  or productive cough,. Const- No unusual weight loss or fever; Neuro-no recent seizure or CVA ; GI- No BRBPR, abdom pain, bloating ; - no  hematuria   see supplement sheet, initialed and to be scanned by staff  Past Medical History:   Diagnosis Date    Atrial fibrillation ([facility] [address] 07/29/2013    Holter 12/9/13    DDD (degenerative disc disease), lumbar     Diverticulosis     ED (erectile dysfunction) 04/08/2014    Enlarged prostate 2014    psa 1.02 9/2017    Grief reaction     son was murdered 2017    History of mitral valve repair 2013    echo 3/2019 stabkle.   EF 50-55% mildly increased valvue pressure 11/2017    History of obstructive sleep apnea     states resolved after mitral replacement 2013    Hypercholesterolemia 04/08/2014    Hyperplastic colon polyp 2013    Idiopathic peripheral neuropathy     did not tolerate lyrica, cymbalta. tried Nutrix    Long term (current) use of anticoagulants     Plantar fasciitis     Vitamin B12 deficiency     injections      Social Hx= reports that he has never smoked. He has never used smokeless tobacco. He reports that he drinks about 4.2 oz of alcohol per week. He reports that he does not use drugs. Exam and Labs:    Visit Vitals  /82 (BP 1 Location: Left arm, BP Patient Position: Sitting)   Pulse 70   Resp 14   Ht 6' 1\" (1.854 m)   Wt 193 lb (87.5 kg)   SpO2 96%   BMI 25.46 kg/m²    @Constitutional:  NAD, comfortable  Head: NC,AT. Eyes: No scleral icterus. Neck:  Neck supple. No JVD present. Throat: moist mucous membranes. Chest: Effort normal & normal respiratory excursion . Neurological: alert, conversant and oriented . Skin: Skin is not cold. No obvious systemic rash noted. Not diaphoretic. No erythema. Psychiatric:  Grossly normal mood and affect. Behavior appears normal. Extremities:  no clubbing or cyanosis. Abdomen: non distended    Lungs:  breath sounds normal. No stridor. distress, wheezes or  Rales. Heart:  normal rate, regular rhythm, normal S1, S2, no murmurs, rubs, clicks or gallops, PMI non displaced. Edema:  Edema is none.   Lab Results   Component Value Date/Time    Cholesterol, total 159 03/01/2019 08:56 AM    HDL Cholesterol 55 03/01/2019 08:56 AM    LDL, calculated 94 03/01/2019 08:56 AM    Triglyceride 49 03/01/2019 08:56 AM     Lab Results   Component Value Date/Time    Sodium 144 03/01/2019 08:56 AM    Potassium 4.5 03/01/2019 08:56 AM    Chloride 105 03/01/2019 08:56 AM    CO2 22 03/01/2019 08:56 AM    Glucose 97 03/01/2019 08:56 AM    BUN 20 03/01/2019 08:56 AM    Creatinine 0.86 03/01/2019 08:56 AM    BUN/Creatinine ratio 23 03/01/2019 08:56 AM    GFR est  03/01/2019 08:56 AM GFR est non-AA 87 2019 08:56 AM    Calcium 9.1 2019 08:56 AM      Wt Readings from Last 3 Encounters:   07/15/19 193 lb (87.5 kg)   19 196 lb 2 oz (89 kg)   19 191 lb (86.6 kg)      BP Readings from Last 3 Encounters:   07/15/19 120/82   19 131/79   19 130/80      Current Outpatient Medications   Medication Sig    Alpha Lipoic Acid 300 mg cap TK UTD    tadalafil (CIALIS) 20 mg tablet Take 1 Tab by mouth as needed (ED).  cyanocobalamin (VITAMIN B12) 1,000 mcg/mL injection 1 mL by IntraMUSCular route every thirty (30) days. Please dispense 10 mg vial  Indications: inadequate vitamin B12    ELIQUIS 5 mg tablet Take 5 mg by mouth two (2) times a day.  B-complex with vitamin C (BEC-ZINC PO) Take  by mouth daily.  magnesium 250 mg tab Take 300 mg by mouth daily. No current facility-administered medications for this visit. Impression see above.      Written by Declan Franklin, as dictated by Fanny Meredith MD.

## 2019-07-15 NOTE — Clinical Note
7/15/19 Patient: Lenora Medina YOB: 1945 Date of Visit: 7/15/2019 Ashley Borja MD 
[place] [address] [address] [facility] [address] VIA In Basket Dear Ashley Borja MD, Thank you for referring Mr. Aisha Ennis to [address] for evaluation. My notes for this consultation are attached. If you have questions, please do not hesitate to call me. I look forward to following your patient along with you.  
 
 
Sincerely, 
 
Iram Kelley MD

## 2019-07-15 NOTE — PATIENT INSTRUCTIONS
You will be scheduled for nuclear stress testing after your appointment today. Wear comfortable clothing (shorts or pants with a shirt or blouse- no underwire bras) and walking or athletic shoes. Do not eat or drink anything, except water, for at least 2 hours prior to your appointment. Avoid tobacco products for at least 6 hours prior to your test.    Do not eat or drink anything containing caffeine, including but not limited to the following: chocolate, regular and decaffeinated coffee, soft drinks, or tea for at least 12-24 hours prior to your test.    Do not hold your scheduled medications prior to your test. If you are a diabetic, please ask your physician how to adjust your food and insulin prior to your test. Please bring all medications you are currently taking. Your test will be performed on a 1 day protocol. This is determined by your height, weight, and other risk factors. For a 2 day test, please allow for 2 hours in the office each day. For a 1 day test, please allow for 4 hours in the office that day. The radioactive isotope used for your testing is different from any of the dyes that are commonly used in x-ray procedures, and is ordered specially for your test. Please call to cancel or reschedule your appointment at least 24 hours prior to your scheduled appointment to avoid being billed for the expensive isotope.

## 2019-07-16 LAB — TROPONIN I SERPL-MCNC: 0.02 NG/ML (ref 0–0.04)

## 2019-08-21 ENCOUNTER — TELEPHONE (OUTPATIENT)
Dept: CARDIOLOGY CLINIC | Age: 74
End: 2019-08-21

## 2019-08-21 NOTE — TELEPHONE ENCOUNTER
Please call  Mandie Anderson at [phone]. He'd like to know what he can take for back pain.      Thank you, Paul Medina

## 2019-08-23 NOTE — TELEPHONE ENCOUNTER
Verified patient with two types of identifiers. Patient reports Dr. Stephanie Cazares advised him to not take Aleve in the past. Patient will try acetaminophen.

## 2019-10-01 ENCOUNTER — TELEPHONE (OUTPATIENT)
Dept: INTERNAL MEDICINE CLINIC | Age: 74
End: 2019-10-01

## 2019-10-01 NOTE — TELEPHONE ENCOUNTER
----- Message from [place] sent at 10/1/2019  4:05 PM EDT -----  Regarding: Dr. Young Nailer: [phone]  Appointment Request From: Dung Hale    With Provider: Bharath Mendosa MD [-Primary Care Physician-]    Preferred Date Range: From 10/1/2019 To 10/11/2019    Preferred Times: Any    Reason: To address the following health maintenance concerns.   Glaucoma Screening Q2y    Comments:  Have seen Dr. Lazarus Li at [facility] at [address] the past

## 2019-10-10 ENCOUNTER — OFFICE VISIT (OUTPATIENT)
Dept: CARDIOLOGY CLINIC | Age: 74
End: 2019-10-10

## 2019-10-10 VITALS
HEART RATE: 57 BPM | SYSTOLIC BLOOD PRESSURE: 112 MMHG | WEIGHT: 196.6 LBS | OXYGEN SATURATION: 97 % | BODY MASS INDEX: 26.06 KG/M2 | DIASTOLIC BLOOD PRESSURE: 73 MMHG | HEIGHT: 73 IN | RESPIRATION RATE: 16 BRPM

## 2019-10-10 DIAGNOSIS — I48.0 PAROXYSMAL ATRIAL FIBRILLATION (HCC): ICD-10-CM

## 2019-10-10 DIAGNOSIS — I42.8 VALVULAR CARDIOMYOPATHY (HCC): ICD-10-CM

## 2019-10-10 DIAGNOSIS — Z98.890 S/P MVR (MITRAL VALVE REPAIR): ICD-10-CM

## 2019-10-10 DIAGNOSIS — I48.20 CHRONIC ATRIAL FIBRILLATION (HCC): Primary | ICD-10-CM

## 2019-10-10 DIAGNOSIS — I34.0 NON-RHEUMATIC MITRAL REGURGITATION: ICD-10-CM

## 2019-10-10 DIAGNOSIS — I34.1 MITRAL VALVE PROLAPSE: ICD-10-CM

## 2019-10-10 DIAGNOSIS — E78.00 HYPERCHOLESTEROLEMIA: ICD-10-CM

## 2019-10-10 NOTE — Clinical Note
10/10/19 Patient: Lynn Manjula YOB: 1945 Date of Visit: 10/10/2019 Edmund Escudero MD 
[place] [address] [address] [facility] [id number] VIA In Basket Dear Edmund Escudero MD, Thank you for referring Mr. Johney Severance to [address] for evaluation. My notes for this consultation are attached. If you have questions, please do not hesitate to call me. I look forward to following your patient along with you.  
 
 
Sincerely, 
 
Keith Wong MD

## 2019-10-10 NOTE — PROGRESS NOTES
Annalisa Grande     1945       Alba Stacy MD, [facility] [place]  Date of Visit-10/10/2019   PCP is Clary Bernard MD   [facility] [place]  [facility]  HPI:  Annalisa Grande is a 69 y.o. male   3 month f/u of   chronic a-fib and he has prior mitral valve repair. Previously taken care of in [place], [place] by [address] [place] [address], saw Dr. Angela Benitez from 09/14/16 indicates patient has chronic atrial fibrillation with moderate MR and history of mitral valve repair. Rosy Alonso had a TERI, mitral  leaflets with severe posterior mitral valve prolapse and probably flail middle scallop with moderate to severe eccentric MR and moderate TR.  The left atrium was moderately dilated as of 03/05/13. Rosy Alonso then underwent surgery on 04/15/13 by Dr. Wen Rooney.      He had a mitral valve repair with a #32 [place] with a right quadrangular repair of posterior leaflet. Rosy Alonso had a follow up echo 08/06/13, which showed an EF of 45%, mild cardiomyopathy, normal mitral valve ring.  Follow up echo 09/16/? showed an EF 50-55%, mild MR and annuloplasty was intact.  He has chronic atrial fibrillation.  Holter monitor showed frequent PVCs 03/07/16  nuclear stress test 7/24/19, normal blood flow with an EF of 56%.   03/20/19   ECHO ADULT COMPLETE 04/01/2019 4/1/2019    Narrative · Calculated left ventricular ejection fraction is 59%. Biplane method   used to measure ejection fraction. Left ventricular mild concentric   hypertrophy. No regional wall motion abnormality noted. · Left atrial cavity size is severely dilated. · Right atrial cavity size is severely dilated. · Mild aortic valve regurgitation is present. · Mitral valve repaired with annuloplasty ring. Trace mitral valve   regurgitation. · Mild tricuspid valve regurgitation is present. There is no evidence of   pulmonary hypertension. · Mild pulmonic valve regurgitation is present.         Signed by: Joslyn Kiran MD      Overall the pt states he is doing well. He is no longer having chest tightness. Denies chest pain, edema, syncope or shortness of breath at rest, has no tachycardia, palpitations or sense of arrhythmia. He is active except being bothered by neuropathy    Assessment/Plan:     1. Chronic atrial fibrillation  He is asymptomatic and feeling well, chest pain resolved, EF is normal.  Had CP which has resolved  Rate fine, on Eliquis   2. Non-rheumatic mitral regurgitation- s/p repair, has trace MR now  3. Mitral valve prolapse  Stable post MV repair  4. Hypercholesterolemia  LDL 94 on no meds, fu  [facility]  5. S/P MVR (mitral valve repair)  6. Valvular cardiomyopathy (HCC)-EF reduced due to MR/MVP now normal  Follow up in 6 months  Future Appointments   Date Time Provider Tejas Garrett   10/22/2019 10:40 AM Jarrod Hernández MD [address]   4/16/2020 10:00 AM Mable Lazaro MD [address]   4/16/2020  1:00 PM Guillermo Mccartney MD [facility]         Impression:   1. Chronic atrial fibrillation    2. Non-rheumatic mitral regurgitation    3. Mitral valve prolapse    4. Hypercholesterolemia    5. S/P MVR (mitral valve repair)    6. Valvular cardiomyopathy [facility])       Cardiac History:   No specialty comments available. ROS-except as noted above. . A complete cardiac and respiratory are reviewed and negative except as above ; Resp-denies wheezing  or productive cough,. Const- No unusual weight loss or fever; Neuro-no recent seizure or CVA ; GI- No BRBPR, abdom pain, bloating ; - no  hematuria   see supplement sheet, initialed and to be scanned by staff  Past Medical History:   Diagnosis Date    Atrial fibrillation ([address] 07/29/2013    Holter 12/9/13    DDD (degenerative disc disease), lumbar     Diverticulosis     ED (erectile dysfunction) 04/08/2014    Enlarged prostate 2014    psa 1.02 9/2017    Grief reaction     son was murdered 2017    History of mitral valve repair 2013    echo 3/2019 stabkle.   EF 50-55% mildly increased valvue pressure 11/2017    History of obstructive sleep apnea     states resolved after mitral replacement 2013    Hypercholesterolemia 04/08/2014    Hyperplastic colon polyp 2013    Idiopathic peripheral neuropathy     did not tolerate lyrica, cymbalta. tried Nutrix    Long term (current) use of anticoagulants     Plantar fasciitis     Vitamin B12 deficiency     injections      Social Hx= reports that he has never smoked. He has never used smokeless tobacco. He reports that he drinks about 7.0 standard drinks of alcohol per week. He reports that he does not use drugs. Exam and Labs:  /73 (BP 1 Location: Left arm, BP Patient Position: Sitting)   Pulse (!) 57   Resp 16   Ht 6' 1\" (1.854 m)   Wt 196 lb 9.6 oz (89.2 kg)   SpO2 97%   BMI 25.94 kg/m² Constitutional:  NAD, comfortable  Head: NC,AT. Eyes: No scleral icterus. Neck:  Neck supple. No JVD present. Throat: moist mucous membranes. Chest: Effort normal & normal respiratory excursion . Neurological: alert, conversant and oriented . Skin: Skin is not cold. No obvious systemic rash noted. Not diaphoretic. No erythema. Psychiatric:  Grossly normal mood and affect. Behavior appears normal. Extremities:  no clubbing or cyanosis. Abdomen: non distended    Lungs:breath sounds normal. No stridor. distress, wheezes or  Rales. Heart: IRIR,  normal S1, S2, no murmurs, rubs, clicks or gallops , PMI non displaced. Edema: Edema is none.   Lab Results   Component Value Date/Time    Cholesterol, total 159 03/01/2019 08:56 AM    HDL Cholesterol 55 03/01/2019 08:56 AM    LDL, calculated 94 03/01/2019 08:56 AM    Triglyceride 49 03/01/2019 08:56 AM     Lab Results   Component Value Date/Time    Sodium 144 03/01/2019 08:56 AM    Potassium 4.5 03/01/2019 08:56 AM    Chloride 105 03/01/2019 08:56 AM    CO2 22 03/01/2019 08:56 AM    Glucose 97 03/01/2019 08:56 AM    BUN 20 03/01/2019 08:56 AM    Creatinine 0.86 03/01/2019 08:56 AM BUN/Creatinine ratio 23 03/01/2019 08:56 AM    GFR est  03/01/2019 08:56 AM    GFR est non-AA 87 03/01/2019 08:56 AM    Calcium 9.1 03/01/2019 08:56 AM      Wt Readings from Last 3 Encounters:   10/10/19 196 lb 9.6 oz (89.2 kg)   07/24/19 193 lb (87.5 kg)   07/15/19 193 lb (87.5 kg)      BP Readings from Last 3 Encounters:   10/10/19 112/73   07/15/19 120/82   04/25/19 131/79      Current Outpatient Medications   Medication Sig    ELIQUIS 5 mg tablet TAKE 1 TABLET(5 MG) BY MOUTH TWICE DAILY    Alpha Lipoic Acid 300 mg cap TK UTD    tadalafil (CIALIS) 20 mg tablet Take 1 Tab by mouth as needed (ED).  cyanocobalamin (VITAMIN B12) 1,000 mcg/mL injection 1 mL by IntraMUSCular route every thirty (30) days. Please dispense 10 mg vial  Indications: inadequate vitamin B12    B-complex with vitamin C (BEC-ZINC PO) Take  by mouth daily.  magnesium 250 mg tab Take 300 mg by mouth daily. No current facility-administered medications for this visit. Impression see above.       Written by Deni Blunt, as dictated by Natalya Gross MD.

## 2019-10-10 NOTE — TELEPHONE ENCOUNTER
Requested Prescriptions     Signed Prescriptions Disp Refills    apixaban (ELIQUIS) 5 mg tablet 56 Tab 0     Sig: TAKE 1 TABLET(5 MG) BY MOUTH TWICE DAILY     Authorizing Provider: Christopher Gonsalez     Ordering User: Peggy Cleaning     Per verbal orders

## 2019-10-11 ENCOUNTER — OFFICE VISIT (OUTPATIENT)
Dept: INTERNAL MEDICINE CLINIC | Age: 74
End: 2019-10-11

## 2019-10-11 VITALS
DIASTOLIC BLOOD PRESSURE: 75 MMHG | OXYGEN SATURATION: 96 % | HEIGHT: 73 IN | RESPIRATION RATE: 18 BRPM | BODY MASS INDEX: 26.11 KG/M2 | TEMPERATURE: 98 F | WEIGHT: 197 LBS | SYSTOLIC BLOOD PRESSURE: 119 MMHG | HEART RATE: 67 BPM

## 2019-10-11 DIAGNOSIS — I48.0 PAROXYSMAL ATRIAL FIBRILLATION (HCC): Primary | ICD-10-CM

## 2019-10-11 DIAGNOSIS — F43.9 SITUATIONAL STRESS: ICD-10-CM

## 2019-10-11 DIAGNOSIS — Z13.5 SCREENING FOR GLAUCOMA: ICD-10-CM

## 2019-10-11 DIAGNOSIS — H35.9 RETINAL DISEASE: ICD-10-CM

## 2019-10-11 DIAGNOSIS — N40.0 ENLARGED PROSTATE: ICD-10-CM

## 2019-10-11 NOTE — PROGRESS NOTES
HISTORY OF PRESENT ILLNESS    Chief Complaint   Patient presents with    Other     pt wants eye exam, prostate exam and regular cardio f/u, needs eliquis samples becuase it's too expensive       Presents for follow-up    He is doing fairly well, but wants to update me on his care and ask for referrals if needed. He is supporting his wife who has adenocarcinoma of the lung and is undergoing chemotherapy. He is overdue for retinal exam.  He has seen ophthalmology Dr. Saad Field in the past and did have laser therapy for a retinal condition in the last couple of years. He did not know he could just make a follow-up without asking. Asks about his prostate. History of mildly enlarged prostate. PSA has been stable. No exam since 2017. Lab Results   Component Value Date/Time    Prostate Specific Ag 1.1 03/01/2019 08:56 AM     He is seeing cardiology Dr. Ramez Mathis for atrial fibrillation. Was seen on October 10.  recommended to continue Eliquis and was given some samples since he is in the donut hole. Nuclear stress test on July 24 was stable. He was having some chest pressure at that time. Review of Systems   All other systems reviewed and are negative, except as noted in HPI    Past Medical and Surgical History   has a past medical history of Atrial fibrillation ([facility] [address] (07/29/2013), DDD (degenerative disc disease), lumbar, Diverticulosis, ED (erectile dysfunction) (04/08/2014), Enlarged prostate (2014), Grief reaction, History of mitral valve repair (2013), History of obstructive sleep apnea, Hypercholesterolemia (04/08/2014), Hyperplastic colon polyp (2013), Idiopathic peripheral neuropathy, Long term (current) use of anticoagulants, Plantar fasciitis, Retinal disease, and Vitamin B12 deficiency.    has a past surgical history that includes hx orthopaedic (2009); hx mitral valvuloplasty (2013); hx colonoscopy (04/03/2013); hx lumbar fusion (1984); hx colonoscopy (04/30/2018); and hx heart catheterization (03/05/2013). reports that he has never smoked. He has never used smokeless tobacco. He reports that he drinks about 7.0 standard drinks of alcohol per week. He reports that he does not use drugs. family history is not on file. Physical Exam   Nursing note and vitals reviewed. Blood pressure 119/75, pulse 67, temperature 98 °F (36.7 °C), temperature source Oral, resp. rate 18, height 6' 1\" (1.854 m), weight 197 lb (89.4 kg), SpO2 96 %. Constitutional:  No distress. Eyes: Conjunctivae are normal.   Ears:  Hearing grossly intact  Cardiovascular: Normal rate. regular rhythm, no murmurs or gallops  No edema  Pulmonary/Chest: Effort normal.   CTAB  Musculoskeletal: moves all 4 extremities   Neurological: Alert and oriented to person, place, and time. Skin: No rash noted. Psychiatric: Normal mood and affect. Behavior is normal.     ASSESSMENT and PLAN  Diagnoses and all orders for this visit:    1. Paroxysmal atrial fibrillation (HCC)  Stable per cardiology. He has samples of Eliquis. He will return to them for further samples if needed. 2. Screening for glaucoma  3. Retinal disease  Recommend follow-up   -     REFERRAL TO OPHTHALMOLOGY    4. Prostate enlargement  Offered rectal exam, but he defers and will wait till his physical in April. PSA has been stable. Offered reassurance    5. Situational anxiety  Offered my support for him and his family as his wife undergoes cancer therapy. His son was murdered in 2017    Patient Instructions          Body Mass Index: Care Instructions  Your Care Instructions    Body mass index (BMI) can help you see if your weight is raising your risk for health problems. It uses a formula to compare how much you weigh with how tall you are. · A BMI lower than 18.5 is considered underweight. · A BMI between 18.5 and 24.9 is considered healthy. · A BMI between 25 and 29.9 is considered overweight. A BMI of 30 or higher is considered obese.   If your BMI is in the normal range, it means that you have a lower risk for weight-related health problems. If your BMI is in the overweight or obese range, you may be at increased risk for weight-related health problems, such as high blood pressure, heart disease, stroke, arthritis or joint pain, and diabetes. If your BMI is in the underweight range, you may be at increased risk for health problems such as fatigue, lower protection (immunity) against illness, muscle loss, bone loss, hair loss, and hormone problems. BMI is just one measure of your risk for weight-related health problems. You may be at higher risk for health problems if you are not active, you eat an unhealthy diet, or you drink too much alcohol or use tobacco products. Follow-up care is a key part of your treatment and safety. Be sure to make and go to all appointments, and call your doctor if you are having problems. It's also a good idea to know your test results and keep a list of the medicines you take. How can you care for yourself at home? · Practice healthy eating habits. This includes eating plenty of fruits, vegetables, whole grains, lean protein, and low-fat dairy. · If your doctor recommends it, get more exercise. Walking is a good choice. Bit by bit, increase the amount you walk every day. Try for at least 30 minutes on most days of the week. · Do not smoke. Smoking can increase your risk for health problems. If you need help quitting, talk to your doctor about stop-smoking programs and medicines. These can increase your chances of quitting for good. · Limit alcohol to 2 drinks a day for men and 1 drink a day for women. Too much alcohol can cause health problems. If you have a BMI higher than 25  · Your doctor may do other tests to check your risk for weight-related health problems.  This may include measuring the distance around your waist. A waist measurement of more than 40 inches in men or 35 inches in women can increase the risk of weight-related health problems. · Talk with your doctor about steps you can take to stay healthy or improve your health. You may need to make lifestyle changes to lose weight and stay healthy, such as changing your diet and getting regular exercise. If you have a BMI lower than 18.5  · Your doctor may do other tests to check your risk for health problems. · Talk with your doctor about steps you can take to stay healthy or improve your health. You may need to make lifestyle changes to gain or maintain weight and stay healthy, such as getting more healthy foods in your diet and doing exercises to build muscle. Where can you learn more? Go to http://gareth-norman.info/. Enter S176 in the search box to learn more about \"Body Mass Index: Care Instructions. \"  Current as of: October 13, 2016  Content Version: 11.4  © 9139-1490 [employer]. Care instructions adapted under license by [employer] (which disclaims liability or warranty for this information). If you have questions about a medical condition or this instruction, always ask your healthcare professional. [place] [address] any warranty or liability for your use of this information. lab results and schedule of future lab studies reviewed with patient  reviewed medications and side effects in detail    Return to clinic for further evaluation if new symptoms develop        Current Outpatient Medications   Medication Sig    apixaban (ELIQUIS) 5 mg tablet TAKE 1 TABLET(5 MG) BY MOUTH TWICE DAILY    Alpha Lipoic Acid 300 mg cap TK UTD    tadalafil (CIALIS) 20 mg tablet Take 1 Tab by mouth as needed (ED).  cyanocobalamin (VITAMIN B12) 1,000 mcg/mL injection 1 mL by IntraMUSCular route every thirty (30) days. Please dispense 10 mg vial  Indications: inadequate vitamin B12    B-complex with vitamin C (BEC-ZINC PO) Take  by mouth daily.  magnesium 250 mg tab Take 300 mg by mouth daily.      No current facility-administered medications for this visit. Discussed the patient's BMI with him. The BMI follow up plan is as follows:     dietary management education, guidance, and counseling  encourage exercise  monitor weight  prescribed dietary intake    An After Visit Summary was printed and given to the patient.

## 2019-10-11 NOTE — PATIENT INSTRUCTIONS
Body Mass Index: Care Instructions Your Care Instructions Body mass index (BMI) can help you see if your weight is raising your risk for health problems. It uses a formula to compare how much you weigh with how tall you are. · A BMI lower than 18.5 is considered underweight. · A BMI between 18.5 and 24.9 is considered healthy. · A BMI between 25 and 29.9 is considered overweight. A BMI of 30 or higher is considered obese. If your BMI is in the normal range, it means that you have a lower risk for weight-related health problems. If your BMI is in the overweight or obese range, you may be at increased risk for weight-related health problems, such as high blood pressure, heart disease, stroke, arthritis or joint pain, and diabetes. If your BMI is in the underweight range, you may be at increased risk for health problems such as fatigue, lower protection (immunity) against illness, muscle loss, bone loss, hair loss, and hormone problems. BMI is just one measure of your risk for weight-related health problems. You may be at higher risk for health problems if you are not active, you eat an unhealthy diet, or you drink too much alcohol or use tobacco products. Follow-up care is a key part of your treatment and safety. Be sure to make and go to all appointments, and call your doctor if you are having problems. It's also a good idea to know your test results and keep a list of the medicines you take. How can you care for yourself at home? · Practice healthy eating habits. This includes eating plenty of fruits, vegetables, whole grains, lean protein, and low-fat dairy. · If your doctor recommends it, get more exercise. Walking is a good choice. Bit by bit, increase the amount you walk every day. Try for at least 30 minutes on most days of the week. · Do not smoke. Smoking can increase your risk for health problems.  If you need help quitting, talk to your doctor about stop-smoking programs and medicines. These can increase your chances of quitting for good. · Limit alcohol to 2 drinks a day for men and 1 drink a day for women. Too much alcohol can cause health problems. If you have a BMI higher than 25 · Your doctor may do other tests to check your risk for weight-related health problems. This may include measuring the distance around your waist. A waist measurement of more than 40 inches in men or 35 inches in women can increase the risk of weight-related health problems. · Talk with your doctor about steps you can take to stay healthy or improve your health. You may need to make lifestyle changes to lose weight and stay healthy, such as changing your diet and getting regular exercise. If you have a BMI lower than 18.5 · Your doctor may do other tests to check your risk for health problems. · Talk with your doctor about steps you can take to stay healthy or improve your health. You may need to make lifestyle changes to gain or maintain weight and stay healthy, such as getting more healthy foods in your diet and doing exercises to build muscle. Where can you learn more? Go to http://gareth-norman.info/. Enter S176 in the search box to learn more about \"Body Mass Index: Care Instructions. \" Current as of: October 13, 2016 Content Version: 11.4 © 7412-8299 [employer]. Care instructions adapted under license by [employer] (which disclaims liability or warranty for this information). If you have questions about a medical condition or this instruction, always ask your healthcare professional. [address] any warranty or liability for your use of this information.

## 2019-11-25 ENCOUNTER — TELEPHONE (OUTPATIENT)
Dept: CARDIOLOGY CLINIC | Age: 74
End: 2019-11-25

## 2019-11-25 DIAGNOSIS — I48.0 PAROXYSMAL ATRIAL FIBRILLATION (HCC): ICD-10-CM

## 2019-11-25 NOTE — TELEPHONE ENCOUNTER
Patient would like to know if he can get samples of Eli[facility] Patient has enough to last him a couple of days. Please advise.      Phone: [phone]

## 2019-11-26 NOTE — TELEPHONE ENCOUNTER
Patient is calling again in regards to his eliquis medication. Please advise.     Phone: [phone]

## 2019-11-26 NOTE — TELEPHONE ENCOUNTER
Verified patient with two types of identifiers. Let patient know he may  samples at his convenience. Patient verbalized understanding and will call with any other questions.

## 2019-12-15 ENCOUNTER — HOSPITAL ENCOUNTER (EMERGENCY)
Age: 74
Discharge: HOME | End: 2019-12-15
Attending: EMERGENCY MEDICINE
Payer: MEDICARE

## 2019-12-15 VITALS
TEMPERATURE: 99.3 F | OXYGEN SATURATION: 99 % | RESPIRATION RATE: 20 BRPM | DIASTOLIC BLOOD PRESSURE: 78 MMHG | BODY MASS INDEX: 25.1 KG/M2 | SYSTOLIC BLOOD PRESSURE: 128 MMHG | WEIGHT: 190.26 LBS | HEART RATE: 82 BPM

## 2019-12-15 DIAGNOSIS — R11.2 NAUSEA VOMITING AND DIARRHEA: Primary | ICD-10-CM

## 2019-12-15 DIAGNOSIS — R19.7 NAUSEA VOMITING AND DIARRHEA: Primary | ICD-10-CM

## 2019-12-15 LAB
ALBUMIN SERPL-MCNC: 4.5 G/DL (ref 3.5–5)
ALBUMIN/GLOB SERPL: 1.2 {RATIO} (ref 1.1–2.2)
ALP SERPL-CCNC: 84 U/L (ref 45–117)
ALT SERPL-CCNC: 30 U/L (ref 12–78)
ANION GAP SERPL CALC-SCNC: 12 MMOL/L (ref 5–15)
APPEARANCE UR: ABNORMAL
AST SERPL-CCNC: 30 U/L (ref 15–37)
BACTERIA URNS QL MICRO: ABNORMAL /HPF
BASOPHILS # BLD: 0 K/UL (ref 0–0.1)
BASOPHILS NFR BLD: 0 % (ref 0–1)
BILIRUB SERPL-MCNC: 1.7 MG/DL (ref 0.2–1)
BILIRUB UR QL CFM: NEGATIVE
BUN SERPL-MCNC: 34 MG/DL (ref 6–20)
BUN/CREAT SERPL: 29 (ref 12–20)
CALCIUM SERPL-MCNC: 9.5 MG/DL (ref 8.5–10.1)
CHLORIDE SERPL-SCNC: 103 MMOL/L (ref 97–108)
CO2 SERPL-SCNC: 25 MMOL/L (ref 21–32)
COLOR UR: ABNORMAL
COMMENT, HOLDF: NORMAL
CREAT SERPL-MCNC: 1.19 MG/DL (ref 0.7–1.3)
DIFFERENTIAL METHOD BLD: ABNORMAL
EOSINOPHIL # BLD: 0 K/UL (ref 0–0.4)
EOSINOPHIL NFR BLD: 0 % (ref 0–7)
EPITH CASTS URNS QL MICRO: ABNORMAL /LPF
ERYTHROCYTE [DISTWIDTH] IN BLOOD BY AUTOMATED COUNT: 13.5 % (ref 11.5–14.5)
GLOBULIN SER CALC-MCNC: 3.9 G/DL (ref 2–4)
GLUCOSE SERPL-MCNC: 134 MG/DL (ref 65–100)
GLUCOSE UR STRIP.AUTO-MCNC: NEGATIVE MG/DL
HCT VFR BLD AUTO: 47.8 % (ref 36.6–50.3)
HGB BLD-MCNC: 15.8 G/DL (ref 12.1–17)
HGB UR QL STRIP: NEGATIVE
HYALINE CASTS URNS QL MICRO: >20 /LPF (ref 0–5)
IMM GRANULOCYTES # BLD AUTO: 0 K/UL (ref 0–0.04)
IMM GRANULOCYTES NFR BLD AUTO: 0 % (ref 0–0.5)
KETONES UR QL STRIP.AUTO: 15 MG/DL
LACTATE BLD-SCNC: 1.77 MMOL/L (ref 0.4–2)
LEUKOCYTE ESTERASE UR QL STRIP.AUTO: NEGATIVE
LIPASE SERPL-CCNC: 69 U/L (ref 73–393)
LYMPHOCYTES # BLD: 0.2 K/UL (ref 0.8–3.5)
LYMPHOCYTES NFR BLD: 3 % (ref 12–49)
MCH RBC QN AUTO: 32 PG (ref 26–34)
MCHC RBC AUTO-ENTMCNC: 33.1 G/DL (ref 30–36.5)
MCV RBC AUTO: 97 FL (ref 80–99)
MONOCYTES # BLD: 0.3 K/UL (ref 0–1)
MONOCYTES NFR BLD: 5 % (ref 5–13)
MUCOUS THREADS URNS QL MICRO: ABNORMAL /LPF
NEUTS BAND NFR BLD MANUAL: 19 %
NEUTS SEG # BLD: 5.9 K/UL (ref 1.8–8)
NEUTS SEG NFR BLD: 73 % (ref 32–75)
NITRITE UR QL STRIP.AUTO: NEGATIVE
PH UR STRIP: 5.5 [PH] (ref 5–8)
PLATELET # BLD AUTO: 190 K/UL (ref 150–400)
PMV BLD AUTO: 12.4 FL (ref 8.9–12.9)
POTASSIUM SERPL-SCNC: 4.5 MMOL/L (ref 3.5–5.1)
PROT SERPL-MCNC: 8.4 G/DL (ref 6.4–8.2)
PROT UR STRIP-MCNC: ABNORMAL MG/DL
RBC # BLD AUTO: 4.93 M/UL (ref 4.1–5.7)
RBC #/AREA URNS HPF: ABNORMAL /HPF (ref 0–5)
RBC MORPH BLD: ABNORMAL
RBC MORPH BLD: ABNORMAL
SAMPLES BEING HELD,HOLD: NORMAL
SODIUM SERPL-SCNC: 140 MMOL/L (ref 136–145)
SP GR UR REFRACTOMETRY: >1.03 (ref 1–1.03)
UROBILINOGEN UR QL STRIP.AUTO: 0.2 EU/DL (ref 0.2–1)
WBC # BLD AUTO: 6.4 K/UL (ref 4.1–11.1)
WBC MORPH BLD: ABNORMAL
WBC URNS QL MICRO: ABNORMAL /HPF (ref 0–4)

## 2019-12-15 PROCEDURE — 80053 COMPREHEN METABOLIC PANEL: CPT

## 2019-12-15 PROCEDURE — 96374 THER/PROPH/DIAG INJ IV PUSH: CPT

## 2019-12-15 PROCEDURE — 83690 ASSAY OF LIPASE: CPT

## 2019-12-15 PROCEDURE — 99284 EMERGENCY DEPT VISIT MOD MDM: CPT

## 2019-12-15 PROCEDURE — 74011250636 HC RX REV CODE- 250/636: Performed by: EMERGENCY MEDICINE

## 2019-12-15 PROCEDURE — 36415 COLL VENOUS BLD VENIPUNCTURE: CPT

## 2019-12-15 PROCEDURE — 83605 ASSAY OF LACTIC ACID: CPT

## 2019-12-15 PROCEDURE — 85025 COMPLETE CBC W/AUTO DIFF WBC: CPT

## 2019-12-15 PROCEDURE — 81001 URINALYSIS AUTO W/SCOPE: CPT

## 2019-12-15 RX ORDER — ONDANSETRON 4 MG/1
4 TABLET, ORALLY DISINTEGRATING ORAL
Qty: 20 TAB | Refills: 0 | Status: SHIPPED | OUTPATIENT
Start: 2019-12-15 | End: 2020-04-16 | Stop reason: ALTCHOICE

## 2019-12-15 RX ORDER — ONDANSETRON 2 MG/ML
4 INJECTION INTRAMUSCULAR; INTRAVENOUS
Status: COMPLETED | OUTPATIENT
Start: 2019-12-15 | End: 2019-12-15

## 2019-12-15 RX ADMIN — SODIUM CHLORIDE 1000 ML: 900 INJECTION, SOLUTION INTRAVENOUS at 20:15

## 2019-12-15 RX ADMIN — SODIUM CHLORIDE 1000 ML: 900 INJECTION, SOLUTION INTRAVENOUS at 21:52

## 2019-12-15 RX ADMIN — ONDANSETRON 4 MG: 2 INJECTION INTRAMUSCULAR; INTRAVENOUS at 20:15

## 2019-12-16 NOTE — ED TRIAGE NOTES
Patient complains of nausea, vomiting and diarrhea. Symptoms started today. He reports being around his grandson on Friday who was experiencing the same symptoms.

## 2019-12-16 NOTE — ED PROVIDER NOTES
Haresh Asher is a 68 yo M with nasuea, vomiting and diarrhea. His symptoms started last night and has continued throughout the day. He has not vomiting in the past 3 hours but he continues to have diarrhea that is yellow and watery. He feels fatigued with dry mouth. His wife has had similar symptoms at the same time and his grandson was ill 2 days ago. Past Medical History:   Diagnosis Date    Atrial fibrillation ([address] 07/29/2013    Holter 12/9/13    DDD (degenerative disc disease), lumbar     Diverticulosis     ED (erectile dysfunction) 04/08/2014    Enlarged prostate 2014    psa 1.02 9/2017    Grief reaction     son was murdered 2017    History of mitral valve repair 2013    echo 3/2019 stabkle. EF 50-55% mildly increased valvue pressure 11/2017    History of obstructive sleep apnea     states resolved after mitral replacement 2013    Hypercholesterolemia 04/08/2014    Hyperplastic colon polyp 2013    Idiopathic peripheral neuropathy     did not tolerate lyrica, cymbalta. tried Nutrix    Long term (current) use of anticoagulants     Plantar fasciitis     Retinal disease     Dr. Rayshawn Rodriguez. hx laser tx    Vitamin B12 deficiency     injections       Past Surgical History:   Procedure Laterality Date    HX COLONOSCOPY  04/03/2013    2 hyperplastic polyps    HX COLONOSCOPY  04/30/2018    2 polyps. Dr. Kristyn Crews  03/05/2013    [place]  normal coronaries, severe MR    HX LUMBAR FUSION  1984    L5-S1    HX MITRAL VALVULOPLASTY  2013    Mitral Valve Annuloplasty    HX ORTHOPAEDIC  2009    Shoulder         History reviewed. No pertinent family history.     Social History     Socioeconomic History    Marital status:      Spouse name: Yojana Wilson Number of children: 3    Years of education: Not on file    Highest education level: Not on file   Occupational History    Not on file   Social Needs    Financial resource strain: Not on file   Jose-Anika insecurity:     Worry: Not on file     Inability: Not on file    Transportation needs:     Medical: Not on file     Non-medical: Not on file   Tobacco Use    Smoking status: Never Smoker    Smokeless tobacco: Never Used   Substance and Sexual Activity    Alcohol use: Yes     Alcohol/week: 7.0 standard drinks     Types: 7 Shots of liquor per week    Drug use: No    Sexual activity: Yes     Partners: Female   Lifestyle    Physical activity:     Days per week: Not on file     Minutes per session: Not on file    Stress: Not on file   Relationships    Social connections:     Talks on phone: Not on file     Gets together: Not on file     Attends Taoism service: Not on file     Active member of club or organization: Not on file     Attends meetings of clubs or organizations: Not on file     Relationship status: Not on file    Intimate partner violence:     Fear of current or ex partner: Not on file     Emotionally abused: Not on file     Physically abused: Not on file     Forced sexual activity: Not on file   Other Topics Concern    Not on file   Social History Narrative    Not on file         ALLERGIES: Beta blocker [beta-blockers (beta-adrenergic blocking agts)]    Review of Systems   Constitutional: Negative for fever. HENT: Negative for sore throat. Eyes: Negative for visual disturbance. Respiratory: Negative for cough. Cardiovascular: Negative for chest pain. Gastrointestinal: Positive for abdominal pain, diarrhea, nausea and vomiting. Genitourinary: Negative for dysuria. Musculoskeletal: Negative for back pain. Skin: Negative for rash. Neurological: Positive for headaches. Vitals:    12/15/19 1923   BP: 137/81   Pulse: (!) 106   Resp: 16   Temp: 99.3 °F (37.4 °C)   SpO2: 97%   Weight: 86.3 kg (190 lb 4.1 oz)            Physical Exam  Vitals signs and nursing note reviewed. Constitutional:       General: He is not in acute distress. Appearance: He is well-developed. HENT:      Head: Normocephalic and atraumatic. Mouth/Throat:      Mouth: Mucous membranes are dry. Eyes:      Conjunctiva/sclera: Conjunctivae normal.   Neck:      Musculoskeletal: Normal range of motion. Trachea: Phonation normal.   Cardiovascular:      Rate and Rhythm: Normal rate. Pulmonary:      Effort: Pulmonary effort is normal. No respiratory distress. Breath sounds: No wheezing. Abdominal:      General: There is no distension. Tenderness: There is no tenderness. There is no guarding. Musculoskeletal: Normal range of motion. General: No tenderness. Skin:     General: Skin is warm and dry. Neurological:      Mental Status: He is alert. He is not disoriented. Motor: No abnormal muscle tone. MDM       10:47 PM  Patient reassessed and is feeling much better. Tolerating PO. Labs reviewed, UA concentrated and creatinine 1.19, receiving 2 L IVNS bolus. Will discharge home with prescription for zofran. Will discharge home with wife and family members who intend to stay with patient and wife tonight.    Procedures

## 2019-12-16 NOTE — ED NOTES
Pt assisted with popsicle. Pt tolerated with no N/V. Purposeful rounding completed. Toileting offered, ongoing plan of care discussed and pts concerns/questions addressed. Pain reassessed. Pt informed of time factors with lab/imaging study results. Pt resting on the stretcher in a position of comfort. Call bell within reach; will continue to monitor.

## 2019-12-16 NOTE — ED NOTES
Discharge note: The patient was discharged home in stable condition, accompanied by family member. The patient is alert and oriented, is in no respiratory distress and has vital signs within normal limits. The patient's diagnosis, condition and treatment were explained to patient by Dr Emery Pierre and reinforced by nurse. The patient party expressed understanding of discharge instructions, prescriptions, and plan of care. A discharge plan has been developed. A  was not involved in the process. Patient offered a wheelchair to ED lob for discharge but declined at this time. Patient ambulatory to ED lobby to go home with family member.

## 2020-03-27 ENCOUNTER — HOSPITAL ENCOUNTER (EMERGENCY)
Age: 75
Discharge: HOME | End: 2020-03-27
Attending: EMERGENCY MEDICINE
Payer: MEDICARE

## 2020-03-27 VITALS
WEIGHT: 183 LBS | DIASTOLIC BLOOD PRESSURE: 73 MMHG | TEMPERATURE: 97.9 F | HEIGHT: 73 IN | OXYGEN SATURATION: 97 % | BODY MASS INDEX: 24.25 KG/M2 | HEART RATE: 56 BPM | SYSTOLIC BLOOD PRESSURE: 126 MMHG | RESPIRATION RATE: 15 BRPM

## 2020-03-27 DIAGNOSIS — Z00.00 WELL ADULT HEALTH CHECK: Primary | ICD-10-CM

## 2020-03-27 PROCEDURE — 99282 EMERGENCY DEPT VISIT SF MDM: CPT

## 2020-03-27 NOTE — ED PROVIDER NOTES
This is a 61-year-old male who presents to the emergency room at the advice of his wife's doctor for testing of the COVID virus. Patient's wife is immunosuppressed and on current therapy for lung cancer. Patient is completely asymptomatic. Denies chest pain, shortness of breath, dizziness, nausea or vomiting, fever or chills. Patient states he has a history of gastritis but no current diarrhea. Has not been around any persons positive for the COVID virus. There are no complaints at this time. Ariana Hickey MD  Past Medical History:  07/29/2013: Atrial fibrillation ([address]      Comment:  Holter 12/9/13  No date: DDD (degenerative disc disease), lumbar  No date: Diverticulosis  04/08/2014: ED (erectile dysfunction)  2014: Enlarged prostate      Comment:  psa 1.02 9/2017  No date: Grief reaction      Comment:  son was murdered 2017 2013: History of mitral valve repair      Comment:  echo 3/2019 stabkle. EF 50-55% mildly increased valvue                pressure 11/2017  No date: History of obstructive sleep apnea      Comment:  states resolved after mitral replacement 2013 04/08/2014: Hypercholesterolemia  2013: Hyperplastic colon polyp  No date: Idiopathic peripheral neuropathy      Comment:  did not tolerate lyrica, cymbalta. tried Nutrix  No date: Long term (current) use of anticoagulants  No date: Plantar fasciitis  No date: Retinal disease      Comment:  Dr. Myla Melendez. hx laser tx  No date: Vitamin B12 deficiency      Comment:  injections  Past Surgical History:   04/03/2013: HX COLONOSCOPY      Comment:  2 hyperplastic polyps  04/30/2018: HX COLONOSCOPY      Comment:  2 polyps.   Dr. Rik Romero  03/05/2013: HX HEART CATHETERIZATION      Comment:  MISSY Almaguer.  normal coronaries, severe MR  1984: HX LUMBAR FUSION      Comment:  L5-S1  2013: HX MITRAL VALVULOPLASTY      Comment:  Mitral Valve Annuloplasty  2009: HX ORTHOPAEDIC      Comment:  Shoulder             Past Medical History:   Diagnosis Date    Atrial [address] ([facility] 07/29/2013    Holter 12/9/13    DDD (degenerative disc disease), lumbar     Diverticulosis     ED (erectile dysfunction) 04/08/2014    Enlarged prostate 2014    psa 1.02 9/2017    Grief reaction     son was murdered 2017    History of mitral valve repair 2013    echo 3/2019 stabkle. EF 50-55% mildly increased valvue pressure 11/2017    History of obstructive sleep apnea     states resolved after mitral replacement 2013    Hypercholesterolemia 04/08/2014    Hyperplastic colon polyp 2013    Idiopathic peripheral neuropathy     did not tolerate lyrica, cymbalta. tried Nutrix    Long term (current) use of anticoagulants     Plantar fasciitis     Retinal disease     Dr. Rebecca Castellanos. hx laser tx    Vitamin B12 deficiency     injections       Past Surgical History:   Procedure Laterality Date    HX COLONOSCOPY  04/03/2013    2 hyperplastic polyps    HX COLONOSCOPY  04/30/2018    2 polyps. Dr. Estelle Baez  03/05/2013    [place]  normal coronaries, severe MR    HX LUMBAR FUSION  1984    L5-S1    HX MITRAL VALVULOPLASTY  2013    Mitral Valve Annuloplasty    HX ORTHOPAEDIC  2009    Shoulder         No family history on file. Social History     Socioeconomic History    Marital status:      Spouse name: Rosaura Fenton Number of children: 3    Years of education: Not on file    Highest education level: Not on file   Occupational History    Not on file   Social Needs    Financial resource strain: Not on file    Food insecurity     Worry: Not on file     Inability: Not on file   [employer] needs     Medical: Not on file     Non-medical: Not on file   Tobacco Use    Smoking status: Never Smoker    Smokeless tobacco: Never Used   Substance and Sexual Activity    Alcohol use:  Yes     Alcohol/week: 7.0 standard drinks     Types: 7 Shots of liquor per week    Drug use: No    Sexual activity: Yes     Partners: Female   Lifestyle    Physical activity     Days per week: Not on file     Minutes per session: Not on file    Stress: Not on file   Relationships    Social connections     Talks on phone: Not on file     Gets together: Not on file     Attends [employer] service: Not on file     Active member of club or organization: Not on file     Attends meetings of clubs or organizations: Not on file     Relationship status: Not on file    Intimate partner violence     Fear of current or ex partner: Not on file     Emotionally abused: Not on file     Physically abused: Not on file     Forced sexual activity: Not on file   Other Topics Concern    Not on file   Social History Narrative    Not on file         ALLERGIES: Beta blocker [beta-blockers (beta-adrenergic blocking agts)]    Review of Systems   Constitutional: Negative for activity change, appetite change, chills, fatigue and fever. HENT: Negative for congestion, ear discharge, ear pain, sinus pressure, sinus pain, sore throat and trouble swallowing. Eyes: Negative for photophobia, pain, redness, itching and visual disturbance. Respiratory: Negative for chest tightness and shortness of breath. Cardiovascular: Negative for chest pain and palpitations. Gastrointestinal: Negative for abdominal distention, abdominal pain, nausea and vomiting. Endocrine: Negative. Genitourinary: Negative for difficulty urinating, frequency and urgency. Musculoskeletal: Negative for back pain, neck pain and neck stiffness. Skin: Negative for color change, pallor, rash and wound. Allergic/Immunologic: Negative. Neurological: Negative for dizziness, syncope, weakness and headaches. Hematological: Does not bruise/bleed easily. Psychiatric/Behavioral: Negative for behavioral problems. The patient is not nervous/anxious.         Vitals:    03/27/20 1110   BP: 126/73   Pulse: (!) 56   Resp: 15   Temp: 97.9 °F (36.6 °C)   SpO2: 97%   Weight: 83 kg (183 lb)   Height: 6' 1\" (1.854 m) Physical Exam  Vitals signs and nursing note reviewed. Exam conducted with a chaperone present. Constitutional:       General: He is not in acute distress. Appearance: Normal appearance. He is well-developed. He is not ill-appearing. HENT:      Head: Normocephalic and atraumatic. Right Ear: External ear normal.      Left Ear: External ear normal.      Nose: Nose normal.   Eyes:      General:         Right eye: No discharge. Left eye: No discharge. Conjunctiva/sclera: Conjunctivae normal.      Pupils: Pupils are equal, round, and reactive to light. Neck:      Musculoskeletal: Normal range of motion and neck supple. Vascular: No JVD. Trachea: No tracheal deviation. Cardiovascular:      Rate and Rhythm: Regular rhythm. Bradycardia present. Heart sounds: Normal heart sounds. No murmur. No gallop. Pulmonary:      Effort: Pulmonary effort is normal. No respiratory distress. Breath sounds: Normal breath sounds. No wheezing or rales. Chest:      Chest wall: No tenderness. Abdominal:      General: Bowel sounds are normal. There is no distension. Palpations: Abdomen is soft. Tenderness: There is no abdominal tenderness. There is no guarding or rebound. Genitourinary:     Comments: Deferred    Musculoskeletal: Normal range of motion. General: No tenderness. Skin:     General: Skin is warm and dry. Capillary Refill: Capillary refill takes less than 2 seconds. Coloration: Skin is not pale. Findings: No erythema or rash. Neurological:      Mental Status: He is alert and oriented to person, place, and time. Psychiatric:         Mood and Affect: Mood normal.         Behavior: Behavior normal.         Thought Content:  Thought content normal.         Judgment: Judgment normal.          MDM  Number of Diagnoses or Management Options  Well adult health check: new and does not require workup  Diagnosis management comments: Discharged home and follow-up with PCP. Return to the emergency room with worsening symptoms including shortness of breath. Patient in agreement with plan of care. 1140: Discharged to home and follow up with PCP.    Procedures

## 2020-03-27 NOTE — ED TRIAGE NOTES
Patient symptomless, told to come get \"tested\"by wife's doctor. Patient states wife is immunosuppressed and has been sick for 10 days.

## 2020-03-27 NOTE — ED NOTES
Pt given  Discharge instructions. Pt educated on importance of using  Bleach, alcohol or soap and water to cleanse things rather than the babywipes they are currently using.

## 2020-04-06 ENCOUNTER — VIRTUAL VISIT (OUTPATIENT)
Dept: CARDIOLOGY CLINIC | Age: 75
End: 2020-04-06

## 2020-04-06 VITALS — WEIGHT: 182 LBS | HEART RATE: 70 BPM | HEIGHT: 73 IN | BODY MASS INDEX: 24.12 KG/M2

## 2020-04-06 DIAGNOSIS — Z98.890 S/P MVR (MITRAL VALVE REPAIR): ICD-10-CM

## 2020-04-06 DIAGNOSIS — G47.62 NOCTURNAL LEG CRAMPS: ICD-10-CM

## 2020-04-06 DIAGNOSIS — I34.0 NON-RHEUMATIC MITRAL REGURGITATION: ICD-10-CM

## 2020-04-06 DIAGNOSIS — I34.1 MITRAL VALVE PROLAPSE: ICD-10-CM

## 2020-04-06 DIAGNOSIS — E78.00 HYPERCHOLESTEROLEMIA: ICD-10-CM

## 2020-04-06 DIAGNOSIS — I48.20 CHRONIC ATRIAL FIBRILLATION (HCC): Primary | ICD-10-CM

## 2020-04-06 NOTE — PROGRESS NOTES
Paulette Butt     1945       Alba Noguera MD, [facility]  Date of Visit-4/6/2020   PCP is Penny Lazaro MD   [facility] [place]  [facility]  Virtual Visit  HPI:  Paulette Butt is a 69 y.o. male   who was seen by synchronous (real-time) audio-video technology on 4/6/2020. Overall doing well. His wife has a URI he does not feel his atrial fibrillation. His chief complaint is leg cramps that only happened night and not with exertion. He has had some minor nosebleed. He is walking and getting out of the house to get groceries but being very careful with the mask and gloves. He has no edema or fevers. Assessment/Plan: Stable atrial fibrillation rate is been fine and now continues with [address] with no bleeding. The symptoms in his legs are benign cramps   There is no great solution unfortunately but they are mild and he is going to try some tonic water. He uses CBD oil for some injuries and cuts and that works well. Follow-up in 6 months  1. Chronic atrial fibrillation    2. Non-rheumatic mitral regurgitation    3. Mitral valve prolapse    4. S/P MVR (mitral valve repair)    5. Hypercholesterolemia    6. Nocturnal leg cramps       This note was created using voice recognition software. Despite editing, there may be syntax errors. Impression: No diagnosis found. Key CAD CHF Meds             apixaban (ELIQUIS) 5 mg tablet (Taking) TAKE 1 TABLET(5 MG) BY MOUTH TWICE DAILY           Cardiac History:   chronic a-fib and he has prior mitral valve repair.  Previously taken care of in [place], [place] by [address] [place] [address], saw Dr. Kaleb Ellington from 09/14/16 indicates patient has chronic atrial fibrillation with moderate MR and history of mitral valve repair. Liliana Angie had a TERI, mitral  leaflets with severe posterior mitral valve prolapse and probably flail middle scallop with moderate to severe eccentric MR and moderate TR.  The left atrium was moderately dilated as of 03/05/13. Drew Carey then underwent surgery on 04/15/13 by Dr. Maryann Weiner.      He had a mitral valve repair with a #[address] [place] with a right quadrangular repair of posterior leaflet. Drew Carey had a follow up echo 08/06/13, which showed an EF of 45%, mild cardiomyopathy, normal mitral valve ring.  Follow up echo 09/16/? showed an EF 50-55%, mild MR and annuloplasty was intact.  He has chronic atrial fibrillation.  Holter monitor showed frequent PVCs 03/07/16  nuclear stress test 7/24/19, normal blood flow with an EF of 56%.   03/20/19   ECHO ADULT COMPLETE 04/01/2019 4/1/2019  Narrative  · Calculated left ventricular ejection fraction is 59%. Biplane method used to measure ejection fraction. Left ventricular mild concentric hypertrophy. No regional wall motion abnormality noted. · Left atrial cavity size is severely dilated. · Right atrial cavity size is severely dilated. · Mild aortic valve regurgitation is present. · Mitral valve repaired with annuloplasty ring. Trace mitral valve regurgitation. · Mild tricuspid valve regurgitation is present. There is no evidence of pulmonary hypertension. · Mild pulmonic valve regurgitation is present. Signed by: Gregory Leon MD     ROS-except as noted above. . A complete cardiac and respiratory are reviewed and negative except as above ; Resp-denies wheezing  or productive cough,. Const- No unusual weight loss or fever; Neuro-no recent seizure or CVA ; GI- No BRBPR, abdom pain, bloating ; - no  hematuria     Past Medical History:   Diagnosis Date    Atrial fibrillation ([address] 07/29/2013    Holter 12/9/13    DDD (degenerative disc disease), lumbar     Diverticulosis     ED (erectile dysfunction) 04/08/2014    Enlarged prostate 2014    psa 1.02 9/2017    Grief reaction     son was murdered 2017    History of mitral valve repair 2013    echo 3/2019 stabkle.   EF 50-55% mildly increased valvue pressure 11/2017    History of obstructive sleep apnea     states resolved after mitral replacement 2013    Hypercholesterolemia 04/08/2014    Hyperplastic colon polyp 2013    Idiopathic peripheral neuropathy     did not tolerate lyrica, cymbalta. tried Nutrix    Long term (current) use of anticoagulants     Plantar fasciitis     Retinal disease     Dr. Sharyn Guerra. hx laser tx    Vitamin B12 deficiency     injections        Social Hx= reports that he has never smoked. He has never used smokeless tobacco. He reports current alcohol use of about 7.0 standard drinks of alcohol per week. He reports that he does not use drugs. Due to this being a TeleHealth evaluation, many elements of the physical examination are unable to be assessed. General: Well developed, in no acute distress, cooperative and alert  HEENT: Pupils equal/round. No marked JVD visible on video. Respiratory: No audible wheezing, no signs of respiratory distress, lips non cyanotic  Extremities:  No edema  Neuro: A&Ox3, speech clear, no facial droop, answering questions appropriately  Skin: Skin color is normal. No rashes or lesions.  Non diaphoretic on visible skin during exam      Lab Results   Component Value Date/Time    Cholesterol, total 159 03/01/2019 08:56 AM    HDL Cholesterol 55 03/01/2019 08:56 AM    LDL, calculated 94 03/01/2019 08:56 AM    Triglyceride 49 03/01/2019 08:56 AM     Lab Results   Component Value Date/Time    Sodium 140 12/15/2019 07:36 PM    Potassium 4.5 12/15/2019 07:36 PM    Chloride 103 12/15/2019 07:36 PM    CO2 25 12/15/2019 07:36 PM    Anion gap 12 12/15/2019 07:36 PM    Glucose 134 (H) 12/15/2019 07:36 PM    BUN 34 (H) 12/15/2019 07:36 PM    Creatinine 1.19 12/15/2019 07:36 PM    BUN/Creatinine ratio 29 (H) 12/15/2019 07:36 PM    GFR est AA >60 12/15/2019 07:36 PM    GFR est non-AA 60 (L) 12/15/2019 07:36 PM    Calcium 9.5 12/15/2019 07:36 PM      Wt Readings from Last 3 Encounters:   04/06/20 182 lb (82.6 kg)   03/27/20 183 lb (83 kg)   12/15/19 190 lb 4.1 oz (86.3 kg) BP Readings from Last 3 Encounters:   03/27/20 126/73   12/15/19 128/78   10/11/19 119/75        Current Outpatient Medications   Medication Sig    ondansetron (ZOFRAN ODT) 4 mg disintegrating tablet Take 1 Tab by mouth every eight (8) hours as needed for Nausea.  apixaban (ELIQUIS) 5 mg tablet TAKE 1 TABLET(5 MG) BY MOUTH TWICE DAILY    tadalafil (CIALIS) 20 mg tablet Take 1 Tab by mouth as needed (ED).  cyanocobalamin (VITAMIN B12) 1,000 mcg/mL injection 1 mL by IntraMUSCular route every thirty (30) days. Please dispense 10 mg vial  Indications: inadequate vitamin B12    B-complex with vitamin C (BEC-ZINC PO) Take  by mouth daily.  Alpha Lipoic Acid 300 mg cap TK UTD    magnesium 250 mg tab Take 300 mg by mouth daily. No current facility-administered medications for this visit. Impression see above. VIRTUAL VISIT DOCUMENTATION     Pursuant to the emergency declaration under the [facility], [facility] waiver authority and the [employer] and [employer] Act, this Virtual  Visit was conducted, with patient's consent, to reduce the patient's risk of exposure to COVID-19 and provide continuity of care for an established patient. Services were provided through a video synchronous discussion virtually to substitute for in-person clinic visit. We discussed the expected course, resolution and complications of the diagnosis(es) in detail. Medication risks, benefits, costs, interactions, and alternatives were discussed as indicated. I advised him to contact the office if his condition worsens, changes or fails to improve as anticipated. He expressed understanding with the diagnosis(es) and plan    I have reviewed the nurses notes, vitals, problem list, allergy list, medical history, family, social history and medications.          FOLLOW-UP            Patient was made aware and verbalized understanding that an appointment will be scheduled for them for a virtual visit and/or office visit within the above time frame. Patient understanding his/her responsibility to call and change time/date if he/she so chooses. Evita Willis MD    [place] [address].  [address] [place] [address], [address] [place] [address], [facility]  [phone] / [phone] Fax  [phone] / [phone] Fax        Greater than 20 minutes was spent in direct video patient care, planning and chart review. This visit was conducted using [employer] Me telemedicine services.

## 2020-04-16 ENCOUNTER — VIRTUAL VISIT (OUTPATIENT)
Dept: INTERNAL MEDICINE CLINIC | Age: 75
End: 2020-04-16

## 2020-04-16 VITALS — BODY MASS INDEX: 24.01 KG/M2 | HEART RATE: 59 BPM | WEIGHT: 182 LBS

## 2020-04-16 DIAGNOSIS — Z00.00 MEDICARE ANNUAL WELLNESS VISIT, SUBSEQUENT: Primary | ICD-10-CM

## 2020-04-16 DIAGNOSIS — N40.0 ENLARGED PROSTATE: ICD-10-CM

## 2020-04-16 DIAGNOSIS — Z11.59 NEED FOR HEPATITIS C SCREENING TEST: ICD-10-CM

## 2020-04-16 DIAGNOSIS — E78.00 HYPERCHOLESTEROLEMIA: ICD-10-CM

## 2020-04-16 NOTE — PROGRESS NOTES
Consent: Jessica Chavira, who was seen by synchronous (real-time) audio-video technology, and/or his healthcare decision maker, is aware that this patient-initiated, Telehealth encounter on 4/16/2020 is a billable service, with coverage as determined by his insurance carrier. He is aware that he may receive a bill and has provided verbal consent to proceed: YES  712  Subjective:   Jessica Chavira is a 69 y.o. male who was seen for Annual Wellness Visit    He is doing fairly well. Quarantined at home with his wife due to current pandemic. Consulted his cardiologist Dr. Lyndsay Orozco 4/6/20. No changes made to medication regimen. He complains of intermittent leg cramps. He is using tonic water and CBD oil which does help a little bit. Trying to keep hydrated. Denies any palpitations dizziness, chest pain. He was seen in the emergency room on December 15 for gastroenteritis. Labs showed mildly elevated liver enzymes but otherwise appeared normal.  He went to the emergency room on March 27 because he wanted coronavirus testing because his wife had mild respiratory symptoms. Unfortunately, this test was not able to be offered. Hyperlipidemia  ROS: taking no meds  No new myalgias, no joint pains, no weakness  No TIA's, no chest pain on exertion, no dyspnea on exertion, no swelling of ankles. Lab Results   Component Value Date/Time    Cholesterol, total 159 03/01/2019 08:56 AM    HDL Cholesterol 55 03/01/2019 08:56 AM    LDL, calculated 94 03/01/2019 08:56 AM    VLDL, calculated 10 03/01/2019 08:56 AM    Triglyceride 49 03/01/2019 08:56 AM       Current Outpatient Medications   Medication Sig    apixaban (ELIQUIS) 5 mg tablet TAKE 1 TABLET(5 MG) BY MOUTH TWICE DAILY    tadalafil (CIALIS) 20 mg tablet Take 1 Tab by mouth as needed (ED).  cyanocobalamin (VITAMIN B12) 1,000 mcg/mL injection 1 mL by IntraMUSCular route every thirty (30) days.  Please dispense 10 mg vial  Indications: inadequate vitamin B12    B-complex with vitamin C (BEC-ZINC PO) Take  by mouth daily.  magnesium 250 mg tab Take 300 mg by mouth daily. No current facility-administered medications for this visit. Allergies   Allergen Reactions    Beta Blocker [Beta-Blockers (Beta-Adrenergic Blocking Agts)] Nausea and Vomiting       Past Medical History:   Diagnosis Date    Atrial fibrillation ([address] 07/29/2013    Holter 12/9/13    DDD (degenerative disc disease), lumbar     Diverticulosis     ED (erectile dysfunction) 04/08/2014    Enlarged prostate 2014    psa 1.02 9/2017    Grief reaction     son was murdered 2017    History of mitral valve repair 2013    echo 3/2019 stabkle. EF 50-55% mildly increased valvue pressure 11/2017    History of obstructive sleep apnea     states resolved after mitral replacement 2013    Hypercholesterolemia 04/08/2014    Hyperplastic colon polyp 2013    Idiopathic peripheral neuropathy     did not tolerate lyrica, cymbalta.   tried Nutrix    Long term (current) use of anticoagulants     Plantar fasciitis     Retinal disease     Dr. Nallely Page. hx laser tx    Vitamin B12 deficiency     injections       ROS  All other systems reviewed and negative, unless mentioned in HPI    Objective:   Vital Signs: (As obtained by patient/caregiver at home)  Visit Vitals  Pulse (!) 59   Wt 182 lb (82.6 kg)   BMI 24.01 kg/m²        [INSTRUCTIONS:  \"[x]\" Indicates a positive item  \"[]\" Indicates a negative item  -- DELETE ALL ITEMS NOT EXAMINED]    Constitutional: [x] Appears well-developed and well-nourished [x] No apparent distress      [] Abnormal -     Mental status: [x] Alert and awake  [x] Oriented to person/place/time [x] Able to follow commands    [] Abnormal -     Eyes:   EOM    [x]  Normal    [] Abnormal -   Sclera  [x]  Normal    [] Abnormal -          Discharge [x]  None visible   [] Abnormal -     HENT: [x] Normocephalic, atraumatic  [] Abnormal -   [x] Mouth/Throat: Mucous membranes are moist    External Ears [x] Normal  [] Abnormal -    Neck: [x] No visualized mass [] Abnormal -     Pulmonary/Chest: [x] Respiratory effort normal   [x] No visualized signs of difficulty breathing or respiratory distress        [] Abnormal -      Musculoskeletal:   [x] Normal gait with no signs of ataxia         [x] Normal range of motion of neck        [] Abnormal -     Neurological:        [x] No Facial Asymmetry (Cranial nerve 7 motor function) (limited exam due to video visit)          [x] No gaze palsy        [] Abnormal -          Skin:        [x] No significant exanthematous lesions or discoloration noted on facial skin         [] Abnormal -            Psychiatric:       [x] Normal Affect [] Abnormal -        [x] No Hallucinations    Other pertinent observable physical exam findings:-        Assessment & Plan:   Diagnoses and all orders for this visit:    1. Medicare annual wellness visit, subsequent  He is up-to-date on all immunizations. We will try to get his glaucoma screening test from Dr. Sondra Anton. Up-to-date on colonoscopy. 2. Need for hepatitis C screening test  -     HEPATITIS C AB; Future    3. Hypercholesterolemia  On no medication.  -     METABOLIC PANEL, COMPREHENSIVE; Future  -     LIPID PANEL; Future    4. Enlarged prostate   Lab Results   Component Value Date/Time    Prostate Specific Ag 1.1 03/01/2019 08:56 AM   Symptoms are stable. He requests digital rectal exam and has schedule an appointment in July to do that. Will check PSA.  -     PSA W/ REFLX FREE PSA; Future          We discussed the expected course, resolution and complications of the diagnosis(es) in detail. Medication risks, benefits, costs, interactions, and alternatives were discussed as indicated. I advised him to contact the office if his condition worsens, changes or fails to improve as anticipated. He expressed understanding with the diagnosis(es) and plan.      Jason Lisa is a 69 y.o. male being evaluated by a video visit encounter for concerns as above. A caregiver was present when appropriate. Due to this being a TeleHealth encounter (During URNVL-56 public health emergency), evaluation of the following organ systems was limited: Vitals/Constitutional/EENT/Resp/CV/GI//MS/Neuro/Skin/Heme-Lymph-Imm. Pursuant to the emergency declaration under the [facility], [facility] waiver authority and the [employer] and [employer] Act, this Virtual  Visit was conducted, with patient's (and/or legal guardian's) consent, to reduce the patient's risk of exposure to COVID-19 and provide necessary medical care. Services were provided through a video synchronous discussion virtually to substitute for in-person clinic visit. Patient and provider were located at their individual homes.     Flako Trinh MD

## 2020-04-17 ENCOUNTER — HOSPITAL ENCOUNTER (OUTPATIENT)
Dept: LAB | Age: 75
Discharge: HOME | End: 2020-04-17

## 2020-04-17 DIAGNOSIS — N40.0 ENLARGED PROSTATE: ICD-10-CM

## 2020-04-17 DIAGNOSIS — E78.00 HYPERCHOLESTEROLEMIA: ICD-10-CM

## 2020-04-17 DIAGNOSIS — Z11.59 NEED FOR HEPATITIS C SCREENING TEST: ICD-10-CM

## 2020-04-17 LAB
ALBUMIN SERPL-MCNC: 4.3 G/DL (ref 3.5–5)
ALBUMIN/GLOB SERPL: 1.6 {RATIO} (ref 1.1–2.2)
ALP SERPL-CCNC: 83 U/L (ref 45–117)
ALT SERPL-CCNC: 50 U/L (ref 12–78)
ANION GAP SERPL CALC-SCNC: 3 MMOL/L (ref 5–15)
AST SERPL-CCNC: 55 U/L (ref 15–37)
BILIRUB SERPL-MCNC: 1.2 MG/DL (ref 0.2–1)
BUN SERPL-MCNC: 22 MG/DL (ref 6–20)
BUN/CREAT SERPL: 26 (ref 12–20)
CALCIUM SERPL-MCNC: 9 MG/DL (ref 8.5–10.1)
CHLORIDE SERPL-SCNC: 108 MMOL/L (ref 97–108)
CHOLEST SERPL-MCNC: 146 MG/DL
CO2 SERPL-SCNC: 30 MMOL/L (ref 21–32)
CREAT SERPL-MCNC: 0.86 MG/DL (ref 0.7–1.3)
GLOBULIN SER CALC-MCNC: 2.7 G/DL (ref 2–4)
GLUCOSE SERPL-MCNC: 93 MG/DL (ref 65–100)
HCV AB SERPL QL IA: NONREACTIVE
HCV COMMENT,HCGAC: NORMAL
HDLC SERPL-MCNC: 67 MG/DL
HDLC SERPL: 2.2 {RATIO} (ref 0–5)
LDLC SERPL CALC-MCNC: 71 MG/DL (ref 0–100)
LIPID PROFILE,FLP: NORMAL
POTASSIUM SERPL-SCNC: 4.4 MMOL/L (ref 3.5–5.1)
PROT SERPL-MCNC: 7 G/DL (ref 6.4–8.2)
SODIUM SERPL-SCNC: 141 MMOL/L (ref 136–145)
TRIGL SERPL-MCNC: 40 MG/DL (ref ?–150)
VLDLC SERPL CALC-MCNC: 8 MG/DL

## 2020-04-18 LAB
PSA SERPL-MCNC: 1 NG/ML (ref 0–4)
REFLEX CRITERIA: NORMAL

## 2020-07-07 ENCOUNTER — OFFICE VISIT (OUTPATIENT)
Dept: INTERNAL MEDICINE CLINIC | Age: 75
End: 2020-07-07

## 2020-07-07 VITALS
BODY MASS INDEX: 24.39 KG/M2 | HEART RATE: 72 BPM | OXYGEN SATURATION: 95 % | WEIGHT: 184 LBS | DIASTOLIC BLOOD PRESSURE: 70 MMHG | RESPIRATION RATE: 18 BRPM | SYSTOLIC BLOOD PRESSURE: 119 MMHG | TEMPERATURE: 98 F | HEIGHT: 73 IN

## 2020-07-07 DIAGNOSIS — G60.9 IDIOPATHIC PERIPHERAL NEUROPATHY: ICD-10-CM

## 2020-07-07 DIAGNOSIS — N40.0 ENLARGED PROSTATE: Primary | ICD-10-CM

## 2020-07-07 NOTE — PROGRESS NOTES
HISTORY OF PRESENT ILLNESS    Chief Complaint   Patient presents with    Benign Prostatic Hypertrophy     Prostate exam       Presents for follow-up    Neuropathy of feet. He wants to [facility]. Interested in MLS laser therapy but has not seen them now. Failed cymbalta, cleo, lyrica    BPH hx. He is concerned about having a prostate exam due to hx of large prostate. Reports stable AM hesitancy which is not as bad later in the day. 1x nocturia. Lab Results   Component Value Date/Time    Prostate Specific Ag 1.0 04/17/2020 09:45 AM    Prostate Specific Ag 1.1 03/01/2019 08:56 AM     Recent tick bites. 5 in past 3 weeks. Removed in 1-2 days he thinks    Review of Systems   All other systems reviewed and are negative, except as noted in HPI    Past Medical and Surgical History   has a past medical history of Atrial fibrillation ([facility] [address] (07/29/2013), DDD (degenerative disc disease), lumbar, Diverticulosis, ED (erectile dysfunction) (04/08/2014), Enlarged prostate (2014), Grief reaction, History of mitral valve repair (2013), History of obstructive sleep apnea, Hypercholesterolemia (04/08/2014), Hyperplastic colon polyp (2013), Idiopathic peripheral neuropathy, Long term (current) use of anticoagulants, Plantar fasciitis, Retinal disease, and Vitamin B12 deficiency. has a past surgical history that includes hx orthopaedic (2009); hx mitral valvuloplasty (2013); hx colonoscopy (04/03/2013); hx lumbar fusion (1984); hx colonoscopy (04/30/2018); and hx heart catheterization (03/05/2013). reports that he has never smoked. He has never used smokeless tobacco. He reports current alcohol use of about 7.0 standard drinks of alcohol per week. He reports that he does not use drugs. family history is not on file. Physical Exam   Nursing note and vitals reviewed. Blood pressure 119/70, pulse 72, temperature 98 °F (36.7 °C), resp.  rate 18, height 6' 1\" (1.854 m), weight 184 lb (83.5 kg), SpO2 95 %. Constitutional:  No distress. Eyes: Conjunctivae are normal.   Ears:  Hearing grossly intact  Cardiovascular: Normal rate. regular rhythm, no murmurs or gallops  No edema  Pulmonary/Chest: Effort normal.   CTAB  Musculoskeletal: moves all 4 extremities   Neurological: Alert and oriented to person, place, and time. Skin: No rash noted. Psychiatric: Normal mood and affect. Behavior is normal.   JULIO CESAR- 1+, no nodules or asymmetry    ASSESSMENT and PLAN  Diagnoses and all orders for this visit:    1. Enlarged prostate  Prostate is mildly enlarged and there are no suspicious findings on exam.  PSA remains stable. Reassured. Repeat PSA in 1 year and I am repeat JULIO CESAR then if he would like. 2. Idiopathic peripheral neuropathy  Long-term peripheral neuropathy of feet. Frustration about this. Mildly uncomfortable but not severely painful. Has tried multiple medications and not tolerated. Counseled about the uncertainty of the etiology of this type of neuropathy. I think it is reasonable to consult with podiatry about other treatment options although I am not sure of the effectiveness of laser treatment. It appears to be FDA approved, however. -     REFERRAL TO PODIATRY    Recent tick bites. Nothing suspicious based on exam or history. lab results and schedule of future lab studies reviewed with patient  reviewed medications and side effects in detail    Return to clinic for further evaluation if new symptoms develop        Current Outpatient Medications   Medication Sig    apixaban (Eliquis) 5 mg tablet TAKE 1 TABLET(5 MG) BY MOUTH TWICE DAILY    tadalafil (CIALIS) 20 mg tablet Take 1 Tab by mouth as needed (ED).  cyanocobalamin (VITAMIN B12) 1,000 mcg/mL injection 1 mL by IntraMUSCular route every thirty (30) days. Please dispense 10 mg vial  Indications: inadequate vitamin B12    magnesium 250 mg tab Take 300 mg by mouth daily.      No current facility-administered medications for this visit.

## 2020-08-22 DIAGNOSIS — E53.8 VITAMIN B12 DEFICIENCY: ICD-10-CM

## 2020-08-23 RX ORDER — CYANOCOBALAMIN 1000 UG/ML
INJECTION, SOLUTION INTRAMUSCULAR; SUBCUTANEOUS
Qty: 10 ML | Refills: 11 | Status: SHIPPED | OUTPATIENT
Start: 2020-08-23

## 2020-09-04 ENCOUNTER — TELEPHONE (OUTPATIENT)
Dept: CARDIOLOGY CLINIC | Age: 75
End: 2020-09-04

## 2020-09-04 DIAGNOSIS — I48.0 PAROXYSMAL ATRIAL FIBRILLATION (HCC): ICD-10-CM

## 2020-09-04 NOTE — TELEPHONE ENCOUNTER
Verified patient with two types of identifiers. Let patient know we do not have any Eliquis samples. Patient verbalized understanding and will call with any other questions.

## 2020-09-15 NOTE — TELEPHONE ENCOUNTER
Verified patient with two types of identifiers. Let patient know I have samples. He will come to [place] tomorrow morning to pick them up. Patient verbalized understanding and will call with any other questions.

## 2020-10-12 ENCOUNTER — OFFICE VISIT (OUTPATIENT)
Dept: CARDIOLOGY CLINIC | Age: 75
End: 2020-10-12
Payer: MEDICARE

## 2020-10-12 VITALS
OXYGEN SATURATION: 98 % | WEIGHT: 194 LBS | SYSTOLIC BLOOD PRESSURE: 110 MMHG | DIASTOLIC BLOOD PRESSURE: 64 MMHG | RESPIRATION RATE: 16 BRPM | BODY MASS INDEX: 25.71 KG/M2 | HEIGHT: 73 IN | HEART RATE: 78 BPM

## 2020-10-12 DIAGNOSIS — I42.8 VALVULAR CARDIOMYOPATHY (HCC): ICD-10-CM

## 2020-10-12 DIAGNOSIS — I34.1 MITRAL VALVE PROLAPSE: ICD-10-CM

## 2020-10-12 DIAGNOSIS — I34.0 NON-RHEUMATIC MITRAL REGURGITATION: ICD-10-CM

## 2020-10-12 DIAGNOSIS — E78.00 HYPERCHOLESTEROLEMIA: ICD-10-CM

## 2020-10-12 DIAGNOSIS — I48.20 CHRONIC ATRIAL FIBRILLATION (HCC): Primary | ICD-10-CM

## 2020-10-12 PROCEDURE — G8427 DOCREV CUR MEDS BY ELIG CLIN: HCPCS | Performed by: SPECIALIST

## 2020-10-12 PROCEDURE — 93010 ELECTROCARDIOGRAM REPORT: CPT | Performed by: SPECIALIST

## 2020-10-12 PROCEDURE — 99214 OFFICE O/P EST MOD 30 MIN: CPT | Performed by: SPECIALIST

## 2020-10-12 PROCEDURE — 3017F COLORECTAL CA SCREEN DOC REV: CPT | Performed by: SPECIALIST

## 2020-10-12 PROCEDURE — G0463 HOSPITAL OUTPT CLINIC VISIT: HCPCS | Performed by: SPECIALIST

## 2020-10-12 PROCEDURE — 1101F PT FALLS ASSESS-DOCD LE1/YR: CPT | Performed by: SPECIALIST

## 2020-10-12 PROCEDURE — 93005 ELECTROCARDIOGRAM TRACING: CPT | Performed by: SPECIALIST

## 2020-10-12 PROCEDURE — G8536 NO DOC ELDER MAL SCRN: HCPCS | Performed by: SPECIALIST

## 2020-10-12 PROCEDURE — G8419 CALC BMI OUT NRM PARAM NOF/U: HCPCS | Performed by: SPECIALIST

## 2020-10-12 PROCEDURE — G8432 DEP SCR NOT DOC, RNG: HCPCS | Performed by: SPECIALIST

## 2020-10-12 RX ORDER — PREGABALIN 100 MG/1
150 CAPSULE ORAL 3 TIMES DAILY
COMMUNITY
Start: 2020-08-20 | End: 2020-11-17

## 2020-10-12 NOTE — PROGRESS NOTES
Sarmad Keys     1945       Alba Murphy MD, [facility]  Date of Visit-10/12/2020   PCP is Angela Worley MD   [facility] [place]  [facility]  HPI:  Sarmad Keys is a 77 y.o. male   F/u of chronic a-fib and he has prior mitral valve repair. Previously taken care of in [place], [place] by [address] [place] [address], saw Dr. Lidia Post from 09/14/16 indicates patient has chronic atrial fibrillation with moderate MR and history of mitral valve repair. [facility] had a TERI, mitral  leaflets with severe posterior mitral valve prolapse and probably flail middle scallop with moderate to severe eccentric MR and moderate TR.  The left atrium was moderately dilated as of 03/05/13. [facility] then underwent surgery on 04/15/13 by Dr. Rosanne Bingham.       He had a mitral valve repair with a #32 [place] with a right quadrangular repair of posterior leaflet. [facility] had a follow up echo 08/06/13, which showed an EF of 45%, mild cardiomyopathy, normal mitral valve ring.  Follow up echo 09/16/? showed an EF 50-55%, mild MR and annuloplasty was intact.  He has chronic atrial fibrillation.  Holter monitor showed frequent PVCs 03/07/16  nuclear stress test 7/24/19, normal blood flow with an EF of 56%.   03/20/19   ECHO ADULT COMPLETE 04/01/2019 4/1/2019    Narrative · Calculated left ventricular ejection fraction is 59%. Biplane method   used to measure ejection fraction. Left ventricular mild concentric   hypertrophy. No regional wall motion abnormality noted. · Left atrial cavity size is severely dilated. · Right atrial cavity size is severely dilated. · Mild aortic valve regurgitation is present. · Mitral valve repaired with annuloplasty ring. Trace mitral valve   regurgitation. · Mild tricuspid valve regurgitation is present. There is no evidence of   pulmonary hypertension. · Mild pulmonic valve regurgitation is present.         Signed by: Kofi Anaya MD      Overall the pt states he is doing well. Pt notes he has had some dizziness, but states he just started Lyrica so he thinks that is causing the dizziness. Denies chest pain, edema, syncope or shortness of breath at rest, has no tachycardia, palpitations or sense of arrhythmia. EKG: Atrial flutter at 61, LAFB    Assessment/Plan:     Patient Instructions   Schedule virtual visit with Dr. Julia Nix in 6 months. Schedule echocardiogram and follow up with Dr. Julia Nix (same day) in 1 year. Future Appointments   Date Time Provider [place]   4/13/2021 11:40 AM Alba Murphy MD CAVRE[facility] BS AMB   10/11/2021  1:00 PM VASCULAR, AINSLEY MCLEOD AMB   10/11/2021  1:40 PM Alba Murphy MD [facility] BS AMB        1. Chronic atrial fibrillation (HCC)  Rate well controlled. EF is normal. Continue Eliquis. 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 Non-rheumatic mitral regurgitation  S/p repair. Trace MR. No murmur. 3. Mitral valve prolapse  S/p repair with good result  4. Hypercholesterolemia  Lab Results   Component Value Date/Time    LDL, calculated 71 04/17/2020 09:45 AM    improved from 94 without meds ,  At goal , denies excess muscle aches or new liver issues  Key Antihyperlipidemia Meds     The patient is on no antihyperlipidemia meds. Lab Results   Component Value Date/Time    LDL, calculated 71 04/17/2020 09:45 AM           5 . Valvular cardiomyopathy (HCC)  EF reduced due to MR/MVP now normal after surgery. F/u with VV in 6 months and OV in 1 year with echo     Impression:   1. Chronic atrial fibrillation ([address]    2. Non-rheumatic mitral regurgitation    3. Mitral valve prolapse    4. Hypercholesterolemia    5. Valvular cardiomyopathy (HCC)       Cardiac History:   chronic a-fib and he has prior mitral valve repair.  Previously taken care of in [place], [place] by [facility], saw Dr. Niraj Quintana from 09/14/16 indicates patient has chronic atrial fibrillation with moderate MR and history of mitral valve repair. [facility] had a TERI, mitral  leaflets with severe posterior mitral valve prolapse and probably flail middle scallop with moderate to severe eccentric MR and moderate TR.  The left atrium was moderately dilated as of 03/05/13. [facility] then underwent surgery on 04/15/13 by Dr. Hasmukh Ravi.      He had a mitral valve repair with a #32 [address] [place] with a right quadrangular repair of posterior leaflet. [facility] had a follow up echo 08/06/13, which showed an EF of 45%, mild cardiomyopathy, normal mitral valve ring.  Follow up echo 09/16/? showed an EF 50-55%, mild MR and annuloplasty was intact.  He has chronic atrial fibrillation.  Holter monitor showed frequent PVCs 03/07/16  nuclear stress test 7/24/19, normal blood flow with an EF of 56%.   03/20/19   ECHO ADULT COMPLETE 04/01/2019 4/1/2019  Narrative  · Calculated left ventricular ejection fraction is 59%. Biplane method used to measure ejection fraction. Left ventricular mild concentric hypertrophy. No regional wall motion abnormality noted. · Left atrial cavity size is severely dilated. · Right atrial cavity size is severely dilated. · Mild aortic valve regurgitation is present. · Mitral valve repaired with annuloplasty ring. Trace mitral valve regurgitation. · Mild tricuspid valve regurgitation is present. There is no evidence of pulmonary hypertension. · Mild pulmonic valve regurgitation is present. Signed by: Oc Abad MD     ROS-except as noted above. . A complete cardiac and respiratory are reviewed and negative except as above ; Resp-denies wheezing  or productive cough,.  Const- No unusual weight loss or fever; Neuro-no recent seizure or CVA ; GI- No BRBPR, abdom pain, bloating ; - no  hematuria   see supplement sheet, initialed and to be scanned by staff  Past Medical History:   Diagnosis Date    Atrial fibrillation ([address] 07/29/2013    Holter 12/9/13    DDD (degenerative disc disease), lumbar     Diverticulosis     ED (erectile dysfunction) 04/08/2014    Enlarged prostate 2014    psa 1.02 9/2017    Grief reaction     son was murdered 2017    History of mitral valve repair 2013    echo 3/2019 stabkle. EF 50-55% mildly increased valvue pressure 11/2017    History of obstructive sleep apnea     states resolved after mitral replacement 2013    Hypercholesterolemia 04/08/2014    Hyperplastic colon polyp 2013    Idiopathic peripheral neuropathy     did not tolerate gabapentin, lyrica, cymbalta. tried Nutrix    Long term (current) use of anticoagulants     Plantar fasciitis     Retinal disease     Dr. Justine Watson. hx laser tx    Vitamin B12 deficiency     injections      Social Hx= reports that he has never smoked. He has never used smokeless tobacco. He reports current alcohol use of about 7.0 standard drinks of alcohol per week. He reports that he does not use drugs. Exam and Labs:  /64 (BP 1 Location: Right arm, BP Patient Position: Sitting)   Pulse 78   Resp 16   Ht 6' 1\" (1.854 m)   Wt 194 lb (88 kg)   SpO2 98%   BMI 25.60 kg/m² Constitutional:  NAD, comfortable  Head: NC,AT. Eyes: No scleral icterus. Neck:  Neck supple. No JVD present. Throat: moist mucous membranes. Chest: Effort normal & normal respiratory excursion . Neurological: alert, conversant and oriented . Skin: Skin is not cold. No obvious systemic rash noted. Not diaphoretic. No erythema. Psychiatric:  Grossly normal mood and affect. Behavior appears normal. Extremities:  no clubbing or cyanosis. Abdomen: non distended    Lungs:breath sounds normal. No stridor. distress, wheezes or  Rales. Heart:IRIR normal S1, S2, no murmurs, rubs, clicks or gallops , PMI non displaced. Edema: Edema is none.   Lab Results   Component Value Date/Time    Cholesterol, total 146 04/17/2020 09:45 AM    HDL Cholesterol 67 04/17/2020 09:45 AM    LDL, calculated 71 04/17/2020 09:45 AM    Triglyceride 40 04/17/2020 09:45 AM    CHOL/HDL Ratio 2.2 04/17/2020 09:45 AM     Lab Results   Component Value Date/Time    Sodium 141 04/17/2020 09:45 AM    Potassium 4.4 04/17/2020 09:45 AM    Chloride 108 04/17/2020 09:45 AM    CO2 30 04/17/2020 09:45 AM    Anion gap 3 (L) 04/17/2020 09:45 AM    Glucose 93 04/17/2020 09:45 AM    BUN 22 (H) 04/17/2020 09:45 AM    Creatinine 0.86 04/17/2020 09:45 AM    BUN/Creatinine ratio 26 (H) 04/17/2020 09:45 AM    GFR est AA >60 04/17/2020 09:45 AM    GFR est non-AA >60 04/17/2020 09:45 AM    Calcium 9.0 04/17/2020 09:45 AM      Wt Readings from Last 3 Encounters:   10/12/20 194 lb (88 kg)   07/07/20 184 lb (83.5 kg)   04/16/20 182 lb (82.6 kg)      BP Readings from Last 3 Encounters:   10/12/20 110/64   07/07/20 119/70   03/27/20 126/73      Current Outpatient Medications   Medication Sig    pregabalin (LYRICA) 100 mg capsule Take 100 mg by mouth three (3) times daily.  apixaban (Eliquis) 5 mg tablet Take 1 Tab by mouth two (2) times a day.  cyanocobalamin (VITAMIN B12) 1,000 mcg/mL injection INJECT 1 ML IN THE MUSCLE EVERY 30 DAYS. (Patient taking differently: 1,000 mcg by IntraMUSCular route.)    tadalafil (CIALIS) 20 mg tablet Take 1 Tab by mouth as needed (ED).  magnesium 250 mg tab Take 300 mg by mouth daily. No current facility-administered medications for this visit. Impression see above.       Written by Laura Boykin, as dictated by Bibi Krishna MD.

## 2020-10-12 NOTE — PATIENT INSTRUCTIONS
Schedule virtual visit with Dr. Ashish Keita in 6 months. Schedule echocardiogram and follow up with Dr. Ashish Keita (same day) in 1 year.

## 2020-10-12 NOTE — Clinical Note
10/12/20 Patient: Cheyanne Johnson YOB: 1945 Date of Visit: 10/12/2020 Abelardo De La Rosa MD 
[place] [address] [address] [facility] [id number] VIA In Basket Dear Abelardo De La Rosa MD, Thank you for referring Mr. Dean Liu to [address] for evaluation. My notes for this consultation are attached. If you have questions, please do not hesitate to call me. I look forward to following your patient along with you.  
 
 
Sincerely, 
 
Jenna Preston MD

## 2020-11-17 ENCOUNTER — TELEPHONE (OUTPATIENT)
Dept: INTERNAL MEDICINE CLINIC | Age: 75
End: 2020-11-17

## 2020-11-17 ENCOUNTER — HOSPITAL ENCOUNTER (EMERGENCY)
Age: 75
Discharge: HOME | End: 2020-11-17
Attending: STUDENT IN AN ORGANIZED HEALTH CARE EDUCATION/TRAINING PROGRAM
Payer: MEDICARE

## 2020-11-17 VITALS
SYSTOLIC BLOOD PRESSURE: 109 MMHG | OXYGEN SATURATION: 90 % | TEMPERATURE: 98.1 F | HEART RATE: 62 BPM | WEIGHT: 192.46 LBS | DIASTOLIC BLOOD PRESSURE: 68 MMHG | RESPIRATION RATE: 16 BRPM | HEIGHT: 73 IN | BODY MASS INDEX: 25.51 KG/M2

## 2020-11-17 DIAGNOSIS — M51.36 DDD (DEGENERATIVE DISC DISEASE), LUMBAR: Primary | ICD-10-CM

## 2020-11-17 PROCEDURE — 99284 EMERGENCY DEPT VISIT MOD MDM: CPT

## 2020-11-17 RX ORDER — PREGABALIN 50 MG/1
CAPSULE ORAL
Qty: 55 CAP | Refills: 0 | Status: SHIPPED | OUTPATIENT
Start: 2020-11-17 | End: 2020-11-17 | Stop reason: SDUPTHER

## 2020-11-17 RX ORDER — PREGABALIN 50 MG/1
CAPSULE ORAL
Qty: 55 CAP | Refills: 0 | Status: SHIPPED | OUTPATIENT
Start: 2020-11-17 | End: 2020-11-30 | Stop reason: ALTCHOICE

## 2020-11-17 NOTE — ED NOTES
Pt was discharged and given instructions by Dr Kimmie Rios . Pt verbalized good understanding of all discharge instructions,prescriptions and F/U care. All questions answered. Pt in stable condition on discharge.

## 2020-11-17 NOTE — TELEPHONE ENCOUNTER
Dr. Janet Garcia calling from [facility] ER would like to speak to PCP regarding patient in ER now.     Tele# [phone]

## 2020-11-17 NOTE — TELEPHONE ENCOUNTER
I spoke with Dr. Cash Gomes Patient was asked to stop lyrica 150 mg tid and cymbalta 30 mg bid 5 days ago by Dr. Gm Yang who is seeing him for neuropathy. Cold turkey. Patient w withdrawal effects. Will resume both meds and taper off lyrica over 15 days, then give it one week, then reduce cymbalta to 30 mg once daily.   See me in 2-3 weeks

## 2020-11-17 NOTE — ED TRIAGE NOTES
Pt ambulated to the treatment area with a steady gait. \"I went to Dr Teresa Courtney for neuropathy in my feet he put me on Cymbalta twice a day and lyrica 3 times a day for 3 months it didn't work so he said to stop the meds there was no taper  I stopped them both as directed on Thursday and by Saturday I felt unusual and I had unbelievable dreams not hungry or thirsty Sunday was the same real puny feeling and I had a headache that came and went and I was feeling real jumpy. I still feel a little jumpy and have the intermittent headaches. \" Pt appears in no distress. Dr Dorantes Resides in room.

## 2020-11-17 NOTE — TELEPHONE ENCOUNTER
Patient called to advise DR. Omid Talamantes requested he stop Lyrica & Cymbalta without tapering. Patient advised stopping medication has caused him to be very sick. Please call patient to advise of PCPs recommendations.    [phone]

## 2020-11-17 NOTE — ED PROVIDER NOTES
Salma Hubbard is a 77 y.o. male with past medical history notable for atrial fibrillation, grief reaction, hypercholesterolemia, anticoagulant use, B12 deficiency presenting with concerning symptoms for withdrawal of two medications which she abruptly stopped taking 5 days ago. He states that he was on Lyrica and Cymbalta for 3 months for back pain. He did not have any improvement with these medications. He was seeing a physician Dr. Jacinto Zamora for these prescriptions and he stated that he can stop them without a taper. 4 days ago he developed nausea, abdominal crampy pain. Did not have vomiting. He had vivid dreams and intermittent headaches and tremulousness and nervousness and feeling \"jumpy\". He has not had difficulty ambulating but he does feel somewhat unsteady. He is also having vague symptoms of feeling \"strange\". He has not had fevers or chills. No shortness of breath. He has never had these symptoms in the past. He has not had any focal weakness or numbness. Past Medical History:   Diagnosis Date    Atrial fibrillation ([facility] [address] 07/29/2013    Holter 12/9/13    DDD (degenerative disc disease), lumbar     Diverticulosis     ED (erectile dysfunction) 04/08/2014    Enlarged prostate 2014    psa 1.02 9/2017    Grief reaction     son was murdered 2017    History of mitral valve repair 2013    echo 3/2019 stabkle. EF 50-55% mildly increased valvue pressure 11/2017    History of obstructive sleep apnea     states resolved after mitral replacement 2013    Hypercholesterolemia 04/08/2014    Hyperplastic colon polyp 2013    Idiopathic peripheral neuropathy     did not tolerate gabapentin, lyrica, cymbalta.   tried Nutrix    Long term (current) use of anticoagulants     Plantar fasciitis     Retinal disease     Dr. Travis Ramos. hx laser tx    Vitamin B12 deficiency     injections       Past Surgical History:   Procedure Laterality Date    HX COLONOSCOPY  04/03/2013    2 hyperplastic polyps    HX COLONOSCOPY  04/30/2018    2 polyps. Dr. Radha Delgado  03/05/2013    [place]  normal coronaries, severe MR    HX LUMBAR FUSION  1984    L5-S1    HX MITRAL VALVULOPLASTY  2013    Mitral Valve Annuloplasty    HX ORTHOPAEDIC  2009    Shoulder         History reviewed. No pertinent family history. Social History     Socioeconomic History    Marital status:      Spouse name: Cami Marie Number of children: 3    Years of education: Not on file    Highest education level: Not on file   Occupational History    Not on file   Social Needs    Financial resource strain: Not on file    Food insecurity     Worry: Not on file     Inability: Not on file   [employer] needs     Medical: Not on file     Non-medical: Not on file   Tobacco Use    Smoking status: Never Smoker    Smokeless tobacco: Never Used   Substance and Sexual Activity    Alcohol use: Yes     Alcohol/week: 7.0 standard drinks     Types: 7 Shots of liquor per week    Drug use: No    Sexual activity: Yes     Partners: Female   Lifestyle    Physical activity     Days per week: Not on file     Minutes per session: Not on file    Stress: Not on file   Relationships    Social connections     Talks on phone: Not on file     Gets together: Not on file     Attends Congregation service: Not on file     Active member of club or organization: Not on file     Attends meetings of clubs or organizations: Not on file     Relationship status: Not on file    Intimate partner violence     Fear of current or ex partner: Not on file     Emotionally abused: Not on file     Physically abused: Not on file     Forced sexual activity: Not on file   Other Topics Concern    Not on file   Social History Narrative    Not on file         ALLERGIES: Beta blocker [beta-blockers (beta-adrenergic blocking agts)]    Review of Systems   Constitutional: Positive for fatigue. Negative for chills and fever.    HENT: Negative for ear pain, sore throat and trouble swallowing. Eyes: Negative for visual disturbance. Respiratory: Negative for cough and shortness of breath. Cardiovascular: Negative for chest pain and leg swelling. Gastrointestinal: Positive for nausea. Negative for abdominal pain, constipation and vomiting. Genitourinary: Negative for dysuria and flank pain. Musculoskeletal: Negative for back pain. Skin: Negative for rash. Neurological: Positive for dizziness and headaches. Negative for seizures, speech difficulty and light-headedness. Psychiatric/Behavioral: Negative for confusion. All other systems reviewed and are negative. Vitals:    11/17/20 1130 11/17/20 1137 11/17/20 1145 11/17/20 1200   BP: (!) 143/89 (!) 154/99 111/75 109/71   Pulse:  72     Resp:  18     Temp:  98.1 °F (36.7 °C)     SpO2: 96% 96% 94% 94%   Weight:  87.3 kg (192 lb 7.4 oz)     Height:  6' 1\" (1.854 m)              Physical Exam  Vitals signs reviewed. Constitutional:       General: He is not in acute distress. HENT:      Head: Normocephalic and atraumatic. Mouth/Throat:      Pharynx: Oropharynx is clear. Comments: Slightly dry  Cardiovascular:      Rate and Rhythm: Normal rate and regular rhythm. Heart sounds: Normal heart sounds. Pulmonary:      Effort: Pulmonary effort is normal.      Breath sounds: Normal breath sounds. Abdominal:      Palpations: Abdomen is soft. Tenderness: There is no abdominal tenderness. There is no guarding or rebound. Musculoskeletal: Normal range of motion. Skin:     General: Skin is warm and dry. Capillary Refill: Capillary refill takes less than 2 seconds. Neurological:      General: No focal deficit present. Mental Status: He is alert and oriented to person, place, and time. GCS: GCS eye subscore is 4. GCS verbal subscore is 5. GCS motor subscore is 6. Cranial Nerves: Cranial nerves are intact. No cranial nerve deficit, dysarthria or facial asymmetry. Sensory: Sensation is intact. Motor: Tremor ( resting tremor) present. No weakness or atrophy. Gait: Gait is intact. Psychiatric:         Attention and Perception: Attention and perception normal.         Mood and Affect: Mood is anxious. Speech: Speech normal. He is communicative. Speech is not delayed. Behavior: Behavior normal.         Thought Content: Thought content normal.      Comments: Mildly anxious           MDM  Number of Diagnoses or Management Options  DDD (degenerative disc disease), lumbar:   Discussed with patient as well with his primary care physician. His symptoms are likely attributable to abrupt cessation of pregabalin and duloxetine. Will taper pregabalin first and then his primary care physician will supervise his discontinuation of duloxetine. If he has any worsening symptoms he was encouraged to return. No evidence of significant dehydration, mood symptoms other than anxiety, ataxia.                Procedures

## 2020-11-17 NOTE — DISCHARGE INSTRUCTIONS
Resume your previous dose of Cymbalta. Taper your Lyrica according to the prescription that was sent to your pharmacy. After you complete your Lyrica taper complete another week of Cymbalta afterwards and then start tapering the Cymbalta as supervised by Dr. Naveen Rocha. If your symptoms worsen or you develop any new concerning symptoms please return immediately to the emergency department.

## 2020-11-30 ENCOUNTER — OFFICE VISIT (OUTPATIENT)
Dept: INTERNAL MEDICINE CLINIC | Age: 75
End: 2020-11-30
Payer: MEDICARE

## 2020-11-30 VITALS
SYSTOLIC BLOOD PRESSURE: 109 MMHG | HEART RATE: 62 BPM | WEIGHT: 191.6 LBS | OXYGEN SATURATION: 96 % | TEMPERATURE: 97.3 F | BODY MASS INDEX: 25.39 KG/M2 | DIASTOLIC BLOOD PRESSURE: 69 MMHG | HEIGHT: 73 IN | RESPIRATION RATE: 13 BRPM

## 2020-11-30 DIAGNOSIS — M72.2 PLANTAR FASCIITIS: ICD-10-CM

## 2020-11-30 DIAGNOSIS — M51.36 DDD (DEGENERATIVE DISC DISEASE), LUMBAR: ICD-10-CM

## 2020-11-30 DIAGNOSIS — G60.9 IDIOPATHIC PERIPHERAL NEUROPATHY: Primary | ICD-10-CM

## 2020-11-30 PROCEDURE — G8510 SCR DEP NEG, NO PLAN REQD: HCPCS | Performed by: INTERNAL MEDICINE

## 2020-11-30 PROCEDURE — 3017F COLORECTAL CA SCREEN DOC REV: CPT | Performed by: INTERNAL MEDICINE

## 2020-11-30 PROCEDURE — 1101F PT FALLS ASSESS-DOCD LE1/YR: CPT | Performed by: INTERNAL MEDICINE

## 2020-11-30 PROCEDURE — G8536 NO DOC ELDER MAL SCRN: HCPCS | Performed by: INTERNAL MEDICINE

## 2020-11-30 PROCEDURE — G0463 HOSPITAL OUTPT CLINIC VISIT: HCPCS | Performed by: INTERNAL MEDICINE

## 2020-11-30 PROCEDURE — 99213 OFFICE O/P EST LOW 20 MIN: CPT | Performed by: INTERNAL MEDICINE

## 2020-11-30 PROCEDURE — G8419 CALC BMI OUT NRM PARAM NOF/U: HCPCS | Performed by: INTERNAL MEDICINE

## 2020-11-30 PROCEDURE — G8427 DOCREV CUR MEDS BY ELIG CLIN: HCPCS | Performed by: INTERNAL MEDICINE

## 2020-11-30 RX ORDER — PREGABALIN 50 MG/1
50 CAPSULE ORAL DAILY
Qty: 5 CAP | Refills: 0
Start: 2020-11-30 | End: 2020-12-11

## 2020-11-30 RX ORDER — DULOXETIN HYDROCHLORIDE 30 MG/1
30 CAPSULE, DELAYED RELEASE ORAL DAILY
Qty: 30 CAP | Refills: 0
Start: 2020-11-30 | End: 2020-11-30 | Stop reason: SDUPTHER

## 2020-11-30 RX ORDER — DULOXETIN HYDROCHLORIDE 30 MG/1
30 CAPSULE, DELAYED RELEASE ORAL DAILY
Qty: 17 CAP | Refills: 0 | Status: SHIPPED | OUTPATIENT
Start: 2020-11-30 | End: 2021-05-04

## 2020-11-30 NOTE — PROGRESS NOTES
HISTORY OF PRESENT ILLNESS    Chief Complaint   Patient presents with   [facility] Follow Up     Reaction to stoping neurapathy meds - [facility] 11/17/2020       Presents for follow-up of ER visit11/17/20 for withdrawal from duloxetine 30 mg bid and Lyrica which he was told to stop 11/11/20 by Dr. Eusebio Rojas. Was taking for neuropathy. Reports he felt severe withdrawal, lack of appetite, tremor, weight loss, chest pain. In ER, labs, EKG ok. Given Lyrica taper and resumed cymbalta 30 mg bid. Taking Lyrica 50 mg bid for past 5 days. Today, reports he is a little bit jittery. Does not feel meds have helped wth neuropathy. Has heel supports and inserts. Steroid infections into heels helped  . Review of Systems   All other systems reviewed and are negative, except as noted in HPI    Past Medical and Surgical History   has a past medical history of Atrial fibrillation ([facility] [address] (07/29/2013), DDD (degenerative disc disease), lumbar, Diverticulosis, ED (erectile dysfunction) (04/08/2014), Enlarged prostate (2014), Grief reaction, History of mitral valve repair (2013), History of obstructive sleep apnea, Hypercholesterolemia (04/08/2014), Hyperplastic colon polyp (2013), Idiopathic peripheral neuropathy, Long term (current) use of anticoagulants, Plantar fasciitis, Retinal disease, and Vitamin B12 deficiency. has a past surgical history that includes hx orthopaedic (2009); hx mitral valvuloplasty (2013); hx colonoscopy (04/03/2013); hx lumbar fusion (1984); hx colonoscopy (04/30/2018); and hx heart catheterization (03/05/2013). reports that he has never smoked. He has never used smokeless tobacco. He reports current alcohol use of about 7.0 standard drinks of alcohol per week. He reports that he does not use drugs. family history is not on file. Physical Exam   Nursing note and vitals reviewed. Blood pressure 109/69, pulse 62, temperature 97.3 °F (36.3 °C), temperature source Oral, resp.  rate 13, height 6' 1\" (1.854 m), weight 191 lb 9.6 oz (86.9 kg), SpO2 96 %. Constitutional:  No distress. Eyes: Conjunctivae are normal.   Ears:  Hearing grossly intact  Cardiovascular: Normal rate. regular rhythm, no murmurs or gallops  No edema  Pulmonary/Chest: Effort normal.   CTAB  Musculoskeletal: moves all 4 extremities   Neurological: Alert and oriented to person, place, and time. Skin: No rash noted. Psychiatric: Normal mood and affect. Behavior is normal.     Diagnoses and all orders for this visit:    1. Idiopathic peripheral neuropathy  Neuropathic pain symptoms have not ideally been controlled with Lyrica and Cymbalta combined. In addition, he had some significant withdrawal effects. His discomfort is not completely all neuropathy, however. He also does have probable degree of plantar fasciitis, heel spurs and heel bursitis/pain. Also with potential lumbar radicular symptoms. He will gradually wean off of Lyrica and Cymbalta. He would like to consult a neurologist which I think is reasonable. Perhaps consideration of a topical compound cream would be an option? Consider EMG to further delineate his pain. Can follow-up with podiatry for further injections if he chooses. -     REFERRAL TO NEUROLOGY  -     pregabalin (LYRICA) 50 mg capsule; Take 1 Cap by mouth daily. Max Daily Amount: 50 mg. 11/30/20. Take 1 pill daily for 5 days, then stop  -     DULoxetine (Cymbalta) 30 mg capsule; Take 1 Cap by mouth daily. Starting 12/9/20, decrease to once daily x 14 days, then take 1 every other day for 6 days, then stop    2. Plantar fasciitis    3. DDD (degenerative disc disease), lumbar          lab results and schedule of future lab studies reviewed with patient  reviewed medications and side effects in detail    Return to clinic for further evaluation if new symptoms develop        Current Outpatient Medications   Medication Sig    DULoxetine (Cymbalta) 30 mg capsule Take 1 Cap by mouth daily.     vit E acet/vit Bcomp,C/zinc (Z-BEC PO) Take  by mouth.  pregabalin (LYRICA) 50 mg capsule Take 2 Caps by mouth three (3) times daily for 5 days, THEN 1 Cap three (3) times daily for 5 days, THEN 1 Cap two (2) times a day for 5 days. Max Daily Amount: 300 mg.  apixaban (Eliquis) 5 mg tablet Take 1 Tab by mouth two (2) times a day.  cyanocobalamin (VITAMIN B12) 1,000 mcg/mL injection INJECT 1 ML IN THE MUSCLE EVERY 30 DAYS. (Patient taking differently: 1,000 mcg by IntraMUSCular route.)    tadalafil (CIALIS) 20 mg tablet Take 1 Tab by mouth as needed (ED).  magnesium 250 mg tab Take 300 mg by mouth daily. No current facility-administered medications for this visit.

## 2020-12-01 ENCOUNTER — TELEPHONE (OUTPATIENT)
Dept: CARDIOLOGY CLINIC | Age: 75
End: 2020-12-01

## 2020-12-01 DIAGNOSIS — I48.0 PAROXYSMAL ATRIAL FIBRILLATION (HCC): ICD-10-CM

## 2020-12-01 NOTE — TELEPHONE ENCOUNTER
Patient is calling to see if we have samples of Eliquis. Please call patient at [phone].  Thanks

## 2020-12-01 NOTE — TELEPHONE ENCOUNTER
Verified patient with two types of identifiers. Let patient know he may  samples at his convenience. Patient verbalized understanding and appreciation.

## 2020-12-09 ENCOUNTER — PATIENT MESSAGE (OUTPATIENT)
Dept: INTERNAL MEDICINE CLINIC | Age: 75
End: 2020-12-09

## 2020-12-11 ENCOUNTER — OFFICE VISIT (OUTPATIENT)
Dept: NEUROLOGY | Age: 75
End: 2020-12-11
Payer: MEDICARE

## 2020-12-11 VITALS
HEART RATE: 68 BPM | DIASTOLIC BLOOD PRESSURE: 82 MMHG | OXYGEN SATURATION: 98 % | BODY MASS INDEX: 25.18 KG/M2 | RESPIRATION RATE: 20 BRPM | WEIGHT: 190 LBS | TEMPERATURE: 96.8 F | HEIGHT: 73 IN | SYSTOLIC BLOOD PRESSURE: 130 MMHG

## 2020-12-11 DIAGNOSIS — G25.81 RLS (RESTLESS LEGS SYNDROME): ICD-10-CM

## 2020-12-11 DIAGNOSIS — R20.2 PARESTHESIA OF BOTH FEET: Primary | ICD-10-CM

## 2020-12-11 PROCEDURE — 99205 OFFICE O/P NEW HI 60 MIN: CPT | Performed by: PSYCHIATRY & NEUROLOGY

## 2020-12-11 PROCEDURE — G8419 CALC BMI OUT NRM PARAM NOF/U: HCPCS | Performed by: PSYCHIATRY & NEUROLOGY

## 2020-12-11 PROCEDURE — G8432 DEP SCR NOT DOC, RNG: HCPCS | Performed by: PSYCHIATRY & NEUROLOGY

## 2020-12-11 PROCEDURE — 1101F PT FALLS ASSESS-DOCD LE1/YR: CPT | Performed by: PSYCHIATRY & NEUROLOGY

## 2020-12-11 PROCEDURE — G8427 DOCREV CUR MEDS BY ELIG CLIN: HCPCS | Performed by: PSYCHIATRY & NEUROLOGY

## 2020-12-11 PROCEDURE — 3017F COLORECTAL CA SCREEN DOC REV: CPT | Performed by: PSYCHIATRY & NEUROLOGY

## 2020-12-11 PROCEDURE — G8536 NO DOC ELDER MAL SCRN: HCPCS | Performed by: PSYCHIATRY & NEUROLOGY

## 2020-12-11 RX ORDER — GABAPENTIN 600 MG/1
TABLET ORAL
Qty: 30 TAB | Refills: 1 | Status: SHIPPED | OUTPATIENT
Start: 2020-12-11 | End: 2021-02-21

## 2020-12-11 NOTE — PROGRESS NOTES
Neuropathy- have had for 1.5-2 years   Has tapered off of the lyrica finally and decreased cymbalta to see how he would do with less medication     The neuropathy has still been the same even with decreased medication

## 2020-12-30 ENCOUNTER — OFFICE VISIT (OUTPATIENT)
Dept: NEUROLOGY | Age: 75
End: 2020-12-30
Payer: MEDICARE

## 2020-12-30 DIAGNOSIS — G60.3 IDIOPATHIC PROGRESSIVE NEUROPATHY: ICD-10-CM

## 2020-12-30 DIAGNOSIS — M54.16 LUMBAR RADICULOPATHY: Primary | ICD-10-CM

## 2020-12-30 PROCEDURE — 95886 MUSC TEST DONE W/N TEST COMP: CPT | Performed by: PSYCHIATRY & NEUROLOGY

## 2020-12-30 PROCEDURE — 95911 NRV CNDJ TEST 9-10 STUDIES: CPT | Performed by: PSYCHIATRY & NEUROLOGY

## 2020-12-30 NOTE — PROCEDURES
EMG/ NCS Report  [facility] - INPATIENT  [address] [facility], [address]   Ph: [phone]   FAX: [phone]/ [phone]  Test Date:  2019      Test Date:  2020    Patient: Abby Payne : 1945 Physician: Janessa Malave   ID#: [id number] SEX: Male Ref. Phys: Janessa Malave     Patient History / Exam:  Patient complaining of chronic bilateral feet pain. Exam reveals patchy sensory deficit in the feet. Assess for neuropathy vs lumbar radiculopathy. EMG & NCV Findings:  Unable to obtain bilateral superficial peroneal sensory, sural sensory and peroneal motor recording to the EDB response. Profound decrease bilateral tibial motor compound muscle action potential amplitudes with significant slowing of the conduction velocities. Normalization of responses when doing bilateral peroneal motor studies recording to the tibialis anterior muscles. Disposable concentric needle EMG (as indicated in the tables) were within reference of normal except for denervation and neuropathic recruitment changes right tibialis anterior muscle. Denervation changes with complex repetitive discharge right lower lumbar paraspinal muscles. Impressions: This study is abnormal. There is electrodiagnostic evidence of a chronic right lower lumbar radiculopathy and significant length dependent distal lower extremity sensorimotor mixed (axonal/demyelinating) polyneuropathy. Continue symptomatic treatment.      Janessa Malave MD    Nerve Conduction Studies  Anti Sensory Summary Table     Stim Site NR Peak (ms) Norm Peak (ms) P-T Amp (µV) Norm P-T Amp Site1 Site2 Dist (cm)   Left Sup Fibular Anti Sensory (Lat ankle)   Lower leg NR  <4.6  >4 Lower leg Lat ankle 10.0   Right Sup Fibular Anti Sensory (Lat ankle)   Lower leg NR  <4.6  >4 Lower leg Lat ankle 10.0   Left Sural Anti Sensory (Lat Mall)   Calf NR  <4.5  >4.0 Calf Lat Mall 14.0   Right Sural Anti Sensory (Lat Mall)   Calf NR  <4.5  >4.0 Calf Lat Mall 14.0     Motor Summary Table     Stim Site NR Onset (ms) Norm Onset (ms) O-P Amp (mV) Norm O-P Amp Amp (Prev) (%) Site1 Site2 Dist (cm) Gonzalo (m/s) Norm Gonzalo (m/s)   Left Fibular Motor (Ext Dig Brev)   Ankle NR  <6.5  >1.1  Ankle Ext Dig Brev 8.0     B Fib NR      B Fib Ankle 0.0  >38   Poplt NR      Poplt B Fib 10.0  >42   Right Fibular Motor (Ext Dig Brev)   Ankle NR  <6.5  >1.1  Ankle Ext Dig Brev 8.0     B Fib NR      B Fib Ankle 0.0  >38   Poplt NR      Poplt B Fib 10.0  >42   Left Fibular TA Motor (Tib Ant)   Fib Head    3.9 <4.5 5.4 >3.0 100.0 Fib Head Tib Ant 10.0     Poplit    5.8  5.8  107.4 Poplit Fib Head 10.0 53 >40   Right Fibular TA Motor (Tib Ant)   Fib Head    3.7 <4.5 4.9 >3.0 100.0 Fib Head Tib Ant 10.0     Poplit    5.7  4.9  100.0 Poplit Fib Head 10.0 50 >40   Left Tibial Motor (Abd Ferrera Brev)   Ankle    6.0 <6.1 0.7 >1.1 100.0 Ankle Abd Ferrera Brev 8.0     Knee    21.6  0.5  71.4 Knee Ankle 44.5 29 >39   Right Tibial Motor (Abd Ferrera Brev)   Ankle    6.1 <6.1 0.1 >1.1 100.0 Ankle Abd Ferrera Brev 8.0     Knee    21.8  0.1  100.0 Knee Ankle 42.0 27 >39       EMG     Side Muscle Nerve Root Ins Act Fibs Psw Recrt Duration Amp Poly Comment   Right VastusLat Femoral L2-4 Nml Nml Nml Nml Nml Nml Nml    Right MedGastroc Tibial S1-2 Nml Nml Nml Nml Nml Nml Nml    Right AntTibialis Dp Br Peron L4-5 Incr 1+ 1+ Reduced Incr Incr 1+    Right Peroneus Long   Nml Nml Nml Nml Nml Nml Nml    Right TensorFascLat SupGluteal L4-5, S1 Nml Nml Nml Nml Nml Nml Nml    Right Lower Lumb Parasp Rami L5,S1 Incr 1+ 1+ Nml Nml Nml Nml CRD   Right Mid Lumb Parasp Rami L4,5 Nml Nml Nml Nml Nml Nml Nml      Waveforms:

## 2021-02-20 DIAGNOSIS — G25.81 RLS (RESTLESS LEGS SYNDROME): ICD-10-CM

## 2021-02-20 DIAGNOSIS — R20.2 PARESTHESIA OF BOTH FEET: ICD-10-CM

## 2021-02-21 RX ORDER — GABAPENTIN 600 MG/1
600 TABLET ORAL
Qty: 30 TAB | Refills: 3 | Status: SHIPPED | OUTPATIENT
Start: 2021-02-21 | End: 2021-05-04

## 2021-03-15 ENCOUNTER — IMMUNIZATION (OUTPATIENT)
Dept: INTERNAL MEDICINE CLINIC | Age: 76
End: 2021-03-15
Payer: MEDICARE

## 2021-03-15 DIAGNOSIS — Z23 ENCOUNTER FOR IMMUNIZATION: Primary | ICD-10-CM

## 2021-03-15 PROCEDURE — 91300 COVID-19, MRNA, LNP-S, PF, 30MCG/0.3ML DOSE(PFIZER): CPT | Performed by: FAMILY MEDICINE

## 2021-03-15 PROCEDURE — 0001A COVID-19, MRNA, LNP-S, PF, 30MCG/0.3ML DOSE(PFIZER): CPT | Performed by: FAMILY MEDICINE

## 2021-04-05 ENCOUNTER — IMMUNIZATION (OUTPATIENT)
Dept: INTERNAL MEDICINE CLINIC | Age: 76
End: 2021-04-05
Payer: MEDICARE

## 2021-04-05 DIAGNOSIS — Z23 ENCOUNTER FOR IMMUNIZATION: Primary | ICD-10-CM

## 2021-04-05 PROCEDURE — 91300 COVID-19, MRNA, LNP-S, PF, 30MCG/0.3ML DOSE(PFIZER): CPT | Performed by: FAMILY MEDICINE

## 2021-04-05 PROCEDURE — 0002A COVID-19, MRNA, LNP-S, PF, 30MCG/0.3ML DOSE(PFIZER): CPT | Performed by: FAMILY MEDICINE

## 2021-05-04 ENCOUNTER — VIRTUAL VISIT (OUTPATIENT)
Dept: CARDIOLOGY CLINIC | Age: 76
End: 2021-05-04
Payer: MEDICARE

## 2021-05-04 DIAGNOSIS — I42.8 VALVULAR CARDIOMYOPATHY (HCC): ICD-10-CM

## 2021-05-04 DIAGNOSIS — I34.1 MITRAL VALVE PROLAPSE: ICD-10-CM

## 2021-05-04 DIAGNOSIS — G47.62 NOCTURNAL LEG CRAMPS: ICD-10-CM

## 2021-05-04 DIAGNOSIS — E78.00 HYPERCHOLESTEROLEMIA: ICD-10-CM

## 2021-05-04 DIAGNOSIS — I48.20 CHRONIC ATRIAL FIBRILLATION (HCC): Primary | ICD-10-CM

## 2021-05-04 DIAGNOSIS — Z98.890 S/P MVR (MITRAL VALVE REPAIR): ICD-10-CM

## 2021-05-04 DIAGNOSIS — I34.0 NON-RHEUMATIC MITRAL REGURGITATION: ICD-10-CM

## 2021-05-04 PROCEDURE — 3017F COLORECTAL CA SCREEN DOC REV: CPT | Performed by: SPECIALIST

## 2021-05-04 PROCEDURE — 1101F PT FALLS ASSESS-DOCD LE1/YR: CPT | Performed by: SPECIALIST

## 2021-05-04 PROCEDURE — G8432 DEP SCR NOT DOC, RNG: HCPCS | Performed by: SPECIALIST

## 2021-05-04 PROCEDURE — 99214 OFFICE O/P EST MOD 30 MIN: CPT | Performed by: SPECIALIST

## 2021-05-04 PROCEDURE — G0463 HOSPITAL OUTPT CLINIC VISIT: HCPCS | Performed by: SPECIALIST

## 2021-05-04 PROCEDURE — G8427 DOCREV CUR MEDS BY ELIG CLIN: HCPCS | Performed by: SPECIALIST

## 2021-05-04 NOTE — PROGRESS NOTES
Chief Complaint   Patient presents with    Follow-up     6 month. Denies chest pain/shortness of breath/dizziness/swelling.

## 2021-05-04 NOTE — PROGRESS NOTES
Elina Mei     1945       Alba Ware MD, [facility]  Date of Visit-5/4/2021   PCP is Yomi Vargas MD   [facility] [place]  [facility]  Virtual Visit  HPI:  Elina Mei is a 77 y.o. male   who was seen by synchronous (real-time) audio-video technology on 5/4/2021. Fu of  chronic a-fib and he has prior mitral valve repair. Previously taken care of in [place], [place] by [address] [place] [address], saw Dr. Melina Booth from 09/14/16 indicates patient has chronic atrial fibrillation with moderate MR and history of mitral valve repair. Ayse Johnson had a TERI, mitral  leaflets with severe posterior mitral valve prolapse and probably flail middle scallop with moderate to severe eccentric MR and moderate TR.  The left atrium was moderately dilated as of 03/05/13. Ayse Johnson then underwent surgery on 04/15/13 by Dr. Van Mendoza.       He had a mitral valve repair with a #32 [address] [place] with a right quadrangular repair of posterior leaflet. Ayse Johnson had a follow up echo 08/06/13, which showed an EF of 45%, mild cardiomyopathy, normal mitral valve ring.  Follow up echo 09/16/? showed an EF 50-55%, mild MR and annuloplasty was intact.  He has chronic atrial fibrillation.  Holter monitor showed frequent PVCs 03/07/16  nuclear stress test 7/24/19, normal blood flow with an EF of 56%.       Today  Leg cramps in morning  Usually one leg  No chest pain  Sob no  Edema no  Dizziness now gone with off Lyrica and Cymbalta  Main complaint if his neuropathy and starting a new program at a gym with diet plan  Going to cut back on sugars, sodas, was eating cereals, ice cream and cookies , and now 196 to 182 #    Previous hx or visit:  03/20/19   ECHO ADULT COMPLETE 04/01/2019 4/1/2019    Narrative · Calculated left ventricular ejection fraction is 59%. Biplane method   used to measure ejection fraction. Left ventricular mild concentric   hypertrophy. No regional wall motion abnormality noted.   · Left atrial cavity size is severely dilated. · Right atrial cavity size is severely dilated. · Mild aortic valve regurgitation is present. · Mitral valve repaired with annuloplasty ring. Trace mitral valve   regurgitation. · Mild tricuspid valve regurgitation is present. There is no evidence of   pulmonary hypertension. · Mild pulmonic valve regurgitation is present. Signed by: Sujit Peñaloza MD     07/24/19   NUCLEAR CARDIAC STRESS TEST 07/24/2019 7/29/2019    Narrative Essentially normal exercise nuclear stress test.   No chest pain with exercise. Atrial fibrillation. Normal Left ventricular function with gated ejection fraction of 56%. Signed by: Sujit Peñaloza MD         chronic a-fib and he has prior mitral valve repair. Previously taken care of in [place], [place] by [facility], saw Dr. Kia Terrazas from 09/14/16 indicates patient has chronic atrial fibrillation with moderate MR and history of mitral valve repair. Valdez Eid had a TERI, mitral  leaflets with severe posterior mitral valve prolapse and probably flail middle scallop with moderate to severe eccentric MR and moderate TR.  The left atrium was moderately dilated as of 03/05/13. Valdez Eid then underwent surgery on 04/15/13 by Dr. Tyler Daly.      He had a mitral valve repair with a #32 [address] with a right quadrangular repair of posterior leaflet. Valdez Eid had a follow up echo 08/06/13, which showed an EF of 45%, mild cardiomyopathy, normal mitral valve ring.  Follow up echo 09/16/? showed an EF 50-55%, mild MR and annuloplasty was intact.  He has chronic atrial fibrillation.  Holter monitor showed frequent PVCs 03/07/16  nuclear stress test 7/24/19, normal blood flow with an EF of 56%.   03/20/19   ECHO ADULT COMPLETE 04/01/2019 4/1/2019  Narrative  · Calculated left ventricular ejection fraction is 59%. Biplane method used to measure ejection fraction. Left ventricular mild concentric hypertrophy.  No regional wall motion abnormality noted. · Left atrial cavity size is severely dilated. · Right atrial cavity size is severely dilated. · Mild aortic valve regurgitation is present. · Mitral valve repaired with annuloplasty ring. Trace mitral valve regurgitation. · Mild tricuspid valve regurgitation is present. There is no evidence of pulmonary hypertension. · Mild pulmonic valve regurgitation is present. Signed by: Jammie Amaya MD  Assessment/Plan:     1. Chronic atrial fibrillation (HCC)  --rate has been fine  On [address], no bleeding    2. Non-rheumatic mitral regurgitation  Successful repair 2013  severe posterior mitral valve prolapse and probably flail middle scallop with moderate to severe eccentric MR   #32 [address] [place] with a right quadrangular repair of posterior leaflet. 3. Mitral valve prolapse  Last echo with good result, repeat in 6 months since will be 2 years    4. Hypercholesterolemia  At goal , denies excess muscle aches or new liver issues  Improved from 89 without meds and now in past 2 months has good intentional weight loss  Key Antihyperlipidemia Meds     The patient is on no antihyperlipidemia meds. Lab Results   Component Value Date/Time    LDL, calculated 71 04/17/2020 09:45 AM         5. Valvular cardiomyopathy (HCC)  *EF normal, no CHF   EF reduced due to MR/MVP now normal after surgery. 6. S/P MVR (mitral valve repair)  04/15/13     7. Nocturnal leg cramps  Seeing Neuro and trying OTC  Does not seem like claudication  Future Appointments   Date Time Provider Tejas Garrett   5/5/2021  3:00 PM Sal Mosquera MD NEUROWTC BS AMB   10/11/2021  1:00 PM VASCULAR, AINSLEY REYES [facility]   10/11/2021  1:40 PM MD ERIC Granger  AMB    has appointment in 6 months with echo and OV  This note was created using voice recognition software. Despite editing, there may be syntax errors.        Key CAD CHF Meds             Eliquis 5 mg tablet (Taking) TAKE 1 TABLET(5 MG) BY MOUTH TWICE DAILY ROS-except as noted above. . A complete cardiac and respiratory are reviewed and negative except as above ; Resp-denies wheezing  or productive cough,. Const- No unusual weight loss or fever; Neuro-no recent seizure or CVA ; GI- No BRBPR, abdom pain, bloating ; - no  hematuria   Past Medical History:   Diagnosis Date    Atrial fibrillation ([facility] [address] 07/29/2013    Holter 12/9/13    DDD (degenerative disc disease), lumbar     Diverticulosis     ED (erectile dysfunction) 04/08/2014    Enlarged prostate 2014    psa 1.02 9/2017    Grief reaction     son was murdered 2017    History of mitral valve repair 2013    echo 3/2019 stabkle. EF 50-55% mildly increased valvue pressure 11/2017    History of obstructive sleep apnea     states resolved after mitral replacement 2013    Hypercholesterolemia 04/08/2014    Hyperplastic colon polyp 2013    Idiopathic peripheral neuropathy     did not tolerate gabapentin, lyrica, cymbalta. tried Nutrix    Long term (current) use of anticoagulants     Plantar fasciitis     Retinal disease     Dr. Olivier Scales. hx laser tx    Vitamin B12 deficiency     injections      Social Hx= reports that he has never smoked. He has never used smokeless tobacco. He reports current alcohol use of about 7.0 standard drinks of alcohol per week. He reports that he does not use drugs. Due to this being a TeleHealth evaluation, many elements of the physical examination are unable to be assessed. Vitals if sent, or see HPI  There were no vitals taken for this visit. General: Well developed, in no acute distress, cooperative and alert  HEENT: Pupils equal/round. No marked JVD visible on video. Respiratory: No audible wheezing, no signs of respiratory distress, lips non cyanotic  Extremities:  No edema  Neuro: A&Ox3, speech clear, no facial droop, answering questions appropriately  Skin: Skin color is normal. No rashes or lesions.  Non diaphoretic on visible skin during exam  Psych: mood and affect are appropriate and pleasant    Lab Results   Component Value Date/Time    Cholesterol, total 146 04/17/2020 09:45 AM    HDL Cholesterol 67 04/17/2020 09:45 AM    LDL, calculated 71 04/17/2020 09:45 AM    Triglyceride 40 04/17/2020 09:45 AM    CHOL/HDL Ratio 2.2 04/17/2020 09:45 AM     Lab Results   Component Value Date/Time    Sodium 141 04/17/2020 09:45 AM    Potassium 4.4 04/17/2020 09:45 AM    Chloride 108 04/17/2020 09:45 AM    CO2 30 04/17/2020 09:45 AM    Anion gap 3 (L) 04/17/2020 09:45 AM    Glucose 93 04/17/2020 09:45 AM    BUN 22 (H) 04/17/2020 09:45 AM    Creatinine 0.86 04/17/2020 09:45 AM    BUN/Creatinine ratio 26 (H) 04/17/2020 09:45 AM    GFR est AA >60 04/17/2020 09:45 AM    GFR est non-AA >60 04/17/2020 09:45 AM    Calcium 9.0 04/17/2020 09:45 AM      Wt Readings from Last 3 Encounters:   12/11/20 190 lb (86.2 kg)   11/30/20 191 lb 9.6 oz (86.9 kg)   11/17/20 192 lb 7.4 oz (87.3 kg)      BP Readings from Last 3 Encounters:   12/11/20 130/82   11/30/20 109/69   11/17/20 109/68        Current Outpatient Medications   Medication Sig    OTHER 1 Cap two (2) times a day. neuragenix-dietary supplement for neuropathy.  Eliquis 5 mg tablet TAKE 1 TABLET(5 MG) BY MOUTH TWICE DAILY    vit E acet/vit Bcomp,C/zinc (Z-BEC PO) Take  by mouth daily.  cyanocobalamin (VITAMIN B12) 1,000 mcg/mL injection INJECT 1 ML IN THE MUSCLE EVERY 30 DAYS. (Patient taking differently: 1,000 mcg by IntraMUSCular route.)    tadalafil (CIALIS) 20 mg tablet Take 1 Tab by mouth as needed (ED).  magnesium 250 mg tab Take 300 mg by mouth daily. No current facility-administered medications for this visit. Impression see above.         VIRTUAL VISIT DOCUMENTATION   Pursuant to the emergency declaration under the [facility] waiver authority and the [facility] and [employer] General Act, this Virtual  Visit was conducted, with patient's consent, to reduce the patient's risk of exposure to COVID-19 and provide continuity of care for an established patient. Services were provided through a video synchronous discussion virtually to substitute for in-person clinic visit. We discussed the expected course, resolution and complications of the diagnosis(es) in detail. Medication risks, benefits, costs, interactions, and alternatives were discussed as indicated. I advised him to contact the office if his condition worsens, changes or fails to improve as anticipated. He expressed understanding with the diagnosis(es) and plan  I have reviewed the nurses notes, vitals, problem list, allergy list, medical history, family, social history and medications. FOLLOW-UP   Patient was made aware and verbalized understanding that an appointment will be scheduled for them for a virtual visit and/or office visit within the above time frame. Patient understanding his/her responsibility to call and change time/date if he/she so chooses. MD Akil [facility].  [address] [place] [address]  [phone] / [phone] Fax  [phone] / [phone] Fax  This visit was conducted using [employer] telemedicine services or similar service.

## 2021-05-04 NOTE — PATIENT INSTRUCTIONS
You have been scheduled for an echocardiogram and a follow up visit with Dr. Kirill Taylor in 6 months. Claudia call our office if the day/time is not convenient.

## 2021-05-05 ENCOUNTER — OFFICE VISIT (OUTPATIENT)
Dept: NEUROLOGY | Age: 76
End: 2021-05-05
Payer: MEDICARE

## 2021-05-05 VITALS
RESPIRATION RATE: 16 BRPM | HEART RATE: 60 BPM | BODY MASS INDEX: 24.52 KG/M2 | DIASTOLIC BLOOD PRESSURE: 60 MMHG | SYSTOLIC BLOOD PRESSURE: 102 MMHG | HEIGHT: 73 IN | OXYGEN SATURATION: 96 % | WEIGHT: 185 LBS

## 2021-05-05 DIAGNOSIS — M54.16 LUMBAR RADICULOPATHY: ICD-10-CM

## 2021-05-05 DIAGNOSIS — G60.3 IDIOPATHIC PROGRESSIVE NEUROPATHY: ICD-10-CM

## 2021-05-05 DIAGNOSIS — G25.81 RLS (RESTLESS LEGS SYNDROME): Primary | ICD-10-CM

## 2021-05-05 PROCEDURE — G8427 DOCREV CUR MEDS BY ELIG CLIN: HCPCS | Performed by: PSYCHIATRY & NEUROLOGY

## 2021-05-05 PROCEDURE — 3017F COLORECTAL CA SCREEN DOC REV: CPT | Performed by: PSYCHIATRY & NEUROLOGY

## 2021-05-05 PROCEDURE — 99214 OFFICE O/P EST MOD 30 MIN: CPT | Performed by: PSYCHIATRY & NEUROLOGY

## 2021-05-05 PROCEDURE — G8432 DEP SCR NOT DOC, RNG: HCPCS | Performed by: PSYCHIATRY & NEUROLOGY

## 2021-05-05 PROCEDURE — G8420 CALC BMI NORM PARAMETERS: HCPCS | Performed by: PSYCHIATRY & NEUROLOGY

## 2021-05-05 PROCEDURE — G8536 NO DOC ELDER MAL SCRN: HCPCS | Performed by: PSYCHIATRY & NEUROLOGY

## 2021-05-05 PROCEDURE — 1101F PT FALLS ASSESS-DOCD LE1/YR: CPT | Performed by: PSYCHIATRY & NEUROLOGY

## 2021-05-05 NOTE — LETTER
5/10/2021    Patient: Tayler Osuna   YOB: 1945   Date of Visit: 5/5/2021     Betty Soto, [address]  [place] [employer]    Dear Betty Soto MD,      Thank you for referring Mr. Margareth Montenegro to [address] [place] [address] [facility] for evaluation. My notes for this consultation are attached. If you have questions, please do not hesitate to call me. I look forward to following your patient along with you.       Sincerely,    Leticia Parish MD

## 2021-07-15 ENCOUNTER — TELEPHONE (OUTPATIENT)
Dept: INTERNAL MEDICINE CLINIC | Age: 76
End: 2021-07-15

## 2021-07-15 NOTE — TELEPHONE ENCOUNTER
I called pt back, he states the pharmacy told him Neurologist d/c med in 2020 but pts rx bottle states he has refills until 08/2021. I advise that pt call his Neurologist to obtain clarification and get more information since he is the prescribing provider for this medication. Pt verbalized understanding and was thankful.

## 2021-07-15 NOTE — TELEPHONE ENCOUNTER
Patient was calling to get prescription for Gabapentin 600 MG - I do not see this medication in patients chart but I do see where he has gotten a refill before from Dr Kwasi Camilo

## 2021-07-22 DIAGNOSIS — R20.2 PARESTHESIA OF BOTH FEET: ICD-10-CM

## 2021-07-22 DIAGNOSIS — G25.81 RLS (RESTLESS LEGS SYNDROME): ICD-10-CM

## 2021-07-22 RX ORDER — GABAPENTIN 600 MG/1
600 TABLET ORAL
Qty: 30 TABLET | Refills: 0 | Status: CANCELLED | OUTPATIENT
Start: 2021-07-22

## 2021-07-22 NOTE — TELEPHONE ENCOUNTER
I looked his gabapentin up and it looks like he has not filled gabapentin since 2/21/21. Why does he wish to re-start it? Per neurology note 5/5/21, pt had decided to wean off it months ago and use alternate treatments. Neurology did state it could be re-started if needed.

## 2021-07-22 NOTE — TELEPHONE ENCOUNTER
----- Message from Jace De La O sent at 7/22/2021 12:42 PM EDT -----  Regarding: MD Tejas/Refill  Medication Refill    Caller (if not patient): Self      Relationship of caller (if not patient): NA      Best contact number(s): [phone]      Name of medication and dosage if known: Gabapentin      Is patient out of this medication (yes/no): Yes      Pharmacy name: [address] [place] [address] listed in chart? (yes/no): Yes  Pharmacy phone number: [phone]      Details to clarify the request: Pt asking if Dr Danita Torrez would mind writing him an Rx for Gabapentin. Says he usually gets it from Matias Mitchell MD , but he is on vacation and the pt is out.            Jace De La O

## 2021-07-23 RX ORDER — GABAPENTIN 600 MG/1
600 TABLET ORAL
Qty: 30 TABLET | Refills: 3 | Status: SHIPPED | OUTPATIENT
Start: 2021-07-23 | End: 2022-07-21

## 2021-07-23 NOTE — TELEPHONE ENCOUNTER
Called, spoke to patient. Two patient identifiers confirmed. Patient advised gabapentin was sent into his pharmacy. Patient verbalized understanding of information discussed w/ no further questions at this time.

## 2021-07-23 NOTE — TELEPHONE ENCOUNTER
Called, spoke to patient. Two patient identifiers confirmed. Patient states he has tried to contact neurology and they cannot give him the reasoning why the medication was canceled. Patient states he takes it on an as needed basis - not nightly which is why it hasn't been filled since 2/2021  Patient states neurology will not contact him back about it until Dr. Iona Mccall comes back from vacation to clarify.

## 2021-07-23 NOTE — TELEPHONE ENCOUNTER
It looks like neurology said that the patient stopped the medication on his own, they did not stop it. Since he was not taking it, it was taken out of his record. Based on prior visits, I have no problem with him taking neuropathy. I have sent in a refill now.

## 2021-08-28 DIAGNOSIS — I48.0 PAROXYSMAL ATRIAL FIBRILLATION (HCC): ICD-10-CM

## 2021-08-28 RX ORDER — APIXABAN 5 MG/1
TABLET, FILM COATED ORAL
Qty: 180 TABLET | Refills: 1 | Status: SHIPPED | OUTPATIENT
Start: 2021-08-28 | End: 2021-12-02 | Stop reason: SDUPTHER

## 2021-10-05 ENCOUNTER — TELEPHONE (OUTPATIENT)
Dept: CARDIOLOGY CLINIC | Age: 76
End: 2021-10-05

## 2021-10-05 NOTE — TELEPHONE ENCOUNTER
Patient is requesting to  samples of the Eliquis at his appointment on 10/11/2021      PHONE:[phone]

## 2021-10-09 ENCOUNTER — HOSPITAL ENCOUNTER (EMERGENCY)
Dept: GENERAL RADIOLOGY | Age: 76
Discharge: HOME | End: 2021-10-09
Attending: EMERGENCY MEDICINE
Payer: MEDICARE

## 2021-10-09 ENCOUNTER — HOSPITAL ENCOUNTER (EMERGENCY)
Age: 76
Discharge: HOME | End: 2021-10-09
Attending: EMERGENCY MEDICINE
Payer: MEDICARE

## 2021-10-09 VITALS
BODY MASS INDEX: 23.46 KG/M2 | SYSTOLIC BLOOD PRESSURE: 129 MMHG | TEMPERATURE: 98.4 F | WEIGHT: 177.03 LBS | DIASTOLIC BLOOD PRESSURE: 75 MMHG | HEIGHT: 73 IN | OXYGEN SATURATION: 97 % | HEART RATE: 67 BPM | RESPIRATION RATE: 16 BRPM

## 2021-10-09 DIAGNOSIS — M25.531 RIGHT WRIST PAIN: Primary | ICD-10-CM

## 2021-10-09 PROCEDURE — 74011250637 HC RX REV CODE- 250/637: Performed by: EMERGENCY MEDICINE

## 2021-10-09 PROCEDURE — 99283 EMERGENCY DEPT VISIT LOW MDM: CPT

## 2021-10-09 PROCEDURE — 73090 X-RAY EXAM OF FOREARM: CPT

## 2021-10-09 PROCEDURE — 73110 X-RAY EXAM OF WRIST: CPT

## 2021-10-09 RX ORDER — HYDROCODONE BITARTRATE AND ACETAMINOPHEN 5; 325 MG/1; MG/1
1 TABLET ORAL
Qty: 12 TABLET | Refills: 0 | Status: SHIPPED | OUTPATIENT
Start: 2021-10-09 | End: 2021-10-12

## 2021-10-09 RX ORDER — IBUPROFEN 800 MG/1
800 TABLET ORAL
Status: COMPLETED | OUTPATIENT
Start: 2021-10-09 | End: 2021-10-09

## 2021-10-09 RX ADMIN — IBUPROFEN 800 MG: 800 TABLET, FILM COATED ORAL at 11:53

## 2021-10-09 NOTE — ED NOTES
The patient was discharged home in stable condition. The patient is alert and oriented, in no respiratory distress and discharge vital signs obtained. The patient's diagnosis, condition and treatment were explained. The patient expressed understanding. Prescriptions escribed. No work/school note given. A discharge plan has been developed. A  was not involved in the process. Aftercare instructions were given. Pt ambulatory out of the ED with family.

## 2021-10-09 NOTE — ED PROVIDER NOTES
71-year-old male with a history of A. fib on anticoagulation presents with right wrist pain and forearm pain. The pain started on Tuesday but has gotten progressively worse. He has no history of gout. He denies any trauma. Past Medical History:   Diagnosis Date    Atrial fibrillation ([address] 07/29/2013    Holter 12/9/13    DDD (degenerative disc disease), lumbar     Diverticulosis     ED (erectile dysfunction) 04/08/2014    Enlarged prostate 2014    psa 1.02 9/2017    Grief reaction     son was murdered 2017    History of mitral valve repair 2013    echo 3/2019 stabkle. EF 50-55% mildly increased valvue pressure 11/2017    History of obstructive sleep apnea     states resolved after mitral replacement 2013    Hypercholesterolemia 04/08/2014    Hyperplastic colon polyp 2013    Idiopathic peripheral neuropathy     did not tolerate gabapentin, lyrica, cymbalta. tried Nutrix    Long term (current) use of anticoagulants     Plantar fasciitis     Retinal disease     Dr. Deshawn Gutierrez. hx laser tx    Vitamin B12 deficiency     injections       Past Surgical History:   Procedure Laterality Date    HX COLONOSCOPY  04/03/2013    2 hyperplastic polyps    HX COLONOSCOPY  04/30/2018    2 polyps. Dr. Putnam Sites  03/05/2013    [place]  normal coronaries, severe MR    HX LUMBAR FUSION  1984    L5-S1    HX MITRAL VALVULOPLASTY  2013    Mitral Valve Annuloplasty    HX ORTHOPAEDIC  2009    Shoulder         No family history on file. Social History     Socioeconomic History    Marital status:      Spouse name: Raegan Jones Number of children: 3    Years of education: Not on file    Highest education level: Not on file   Occupational History    Not on file   Tobacco Use    Smoking status: Never Smoker    Smokeless tobacco: Never Used   Substance and Sexual Activity    Alcohol use:  Yes     Alcohol/week: 7.0 standard drinks     Types: 7 Shots of liquor per week    Drug use: No    Sexual activity: Yes     Partners: Female   Other Topics Concern    Not on file   Social History Narrative    Not on file     Social Determinants of Health     Financial Resource Strain:     Difficulty of Paying Living Expenses:    Food Insecurity:     Worried About Running Out of Food in the Last Year:     [address] in the Last Year:    Transportation Needs:     Lack of Transportation (Medical):  Lack of Transportation (Non-Medical):    Physical Activity:     Days of Exercise per Week:     Minutes of Exercise per Session:    Stress:     Feeling of Stress :    Social Connections:     Frequency of Communication with Friends and Family:     Frequency of Social Gatherings with Friends and Family:     Attends Buddhism Services:     Active Member of Clubs or Organizations:     Attends Club or Organization Meetings:     Marital Status:    Intimate Partner Violence:     Fear of Current or Ex-Partner:     Emotionally Abused:     Physically Abused:     Sexually Abused: ALLERGIES: Beta blocker [beta-blockers (beta-adrenergic blocking agts)]    Review of Systems   Constitutional: Negative for fever. HENT: Negative for rhinorrhea. Respiratory: Negative for cough. Cardiovascular: Negative for chest pain. Gastrointestinal: Negative for abdominal pain. Genitourinary: Negative for dysuria. Musculoskeletal: Positive for arthralgias. Negative for back pain. Skin: Negative for wound. Neurological: Negative for headaches. Psychiatric/Behavioral: Negative for confusion. There were no vitals filed for this visit. Physical Exam  Vitals and nursing note reviewed. Constitutional:       General: He is not in acute distress. Appearance: Normal appearance. He is not ill-appearing, toxic-appearing or diaphoretic. HENT:      Head: Normocephalic. Eyes:      Extraocular Movements: Extraocular movements intact.    Cardiovascular:      Rate and Rhythm: Normal rate. Pulses: Normal pulses. Heart sounds: Normal heart sounds. Pulmonary:      Effort: Pulmonary effort is normal. No respiratory distress. Breath sounds: Normal breath sounds. Abdominal:      General: There is no distension. Palpations: Abdomen is soft. Musculoskeletal:         General: Normal range of motion. Cervical back: Normal range of motion. Comments: No pain with flexion or extension of the right wrist.  No erythema or warmth to suggest infection or gout. Skin:     General: Skin is dry. Capillary Refill: Capillary refill takes less than 2 seconds. Neurological:      Mental Status: He is alert and oriented to person, place, and time. Psychiatric:         Mood and Affect: Mood normal.          MDM  Number of Diagnoses or Management Options  Right wrist pain  Diagnosis management comments: Patient presents with right wrist and forearm pain. Certainly possibly suffering from tendinitis gout and infection are extremely unlikely as there is no skin changes or warmth. Plain film x-rays were obtained and showed no fracture. Will prescribe him Norco and recommend orthopedic surgery follow-up. Discussed my clinical impression(s), any labs and/or radiology results with the patient. I answered any questions and addressed any concerns. Discussed the importance of following up with their primary care physician and/or specialist(s). Discussed signs or symptoms that would warrant return back to the ER for further evaluation. The patient is agreeable with discharge.        Amount and/or Complexity of Data Reviewed  Tests in the radiology section of CPT®: ordered and reviewed           Procedures

## 2021-10-11 ENCOUNTER — TELEPHONE (OUTPATIENT)
Dept: INTERNAL MEDICINE CLINIC | Age: 76
End: 2021-10-11

## 2021-10-11 DIAGNOSIS — M77.8 TENDONITIS OF WRIST, RIGHT: Primary | ICD-10-CM

## 2021-10-11 NOTE — TELEPHONE ENCOUNTER
Patient was at the er this past weekend and was told by the doctor there that he should see an orthopedic specialist. Patient does not know any and would like to know if Dr. Avtar Carrera can recommend/refer him to one. Please call back and advise.

## 2021-10-21 ENCOUNTER — TELEPHONE (OUTPATIENT)
Dept: CARDIOLOGY CLINIC | Age: 76
End: 2021-10-21

## 2021-10-21 NOTE — TELEPHONE ENCOUNTER
Patient stated that when his blood pressure was taken, there was a difference between the right and left arm. Wanted to know if he should be concerned.        Phone: [phone]

## 2021-10-26 NOTE — TELEPHONE ENCOUNTER
Verified patient with two types of identifiers. Patient reports his BP was 141/90 in one arm and 122/70 in the other in the ER. Let him know those numbers may have not been accurate r/t the stress and pain he was under. Recommended he purchase a BP cuff and check his BP over the next month at random times of day in both arms and update us. Confirmed follow ups and that he was able to follow up with Ortho. Patient verbalized understanding and will call with any other questions.

## 2021-10-27 ENCOUNTER — TELEPHONE (OUTPATIENT)
Dept: CARDIOLOGY CLINIC | Age: 76
End: 2021-10-27

## 2021-10-27 NOTE — TELEPHONE ENCOUNTER
Patient has a dental appt at 10am on 10/28/21. Patient would like to know if he should take Eliquis tonight, in the morning, or both.      Phone: [phone]  Transferred call to [employer] [facility]

## 2021-10-27 NOTE — TELEPHONE ENCOUNTER
Verified patient with two patient identifiers. Received a phone call from the patient and states that he is scheduled to see his Dentist tomorrow for routine check up. He would like to know when he can stop Eliquis? Informed him that I need 's advise about this matter. Patient will cancel his Dental appointment for tomorrow and reschedule for later date. Patient verbalized understanding.

## 2021-11-06 NOTE — TELEPHONE ENCOUNTER
Why is he thinking he would need to stop Eliquis? For routine dental procedures including cleaning and fillings there is no reason to stop the Eliquis. If he was having an extensive extraction we could stop it for 48 hours prior. For routine visit I would advise him to first see the dentist and then see what is needed before a later appointment if there is a procedure to be done.     Future Appointments   Date Time Provider Tejas Garrett   11/11/2021  1:00 PM AINSLEY UMANA   11/11/2021  2:20 PM Alba Murphy MD [facility] AMB

## 2021-11-08 NOTE — TELEPHONE ENCOUNTER
Verified patient with two patient identifiers. Called  patient and informed him per,  regarding his future Dental appointment. Patient verbalized understanding.

## 2021-11-11 ENCOUNTER — ANCILLARY PROCEDURE (OUTPATIENT)
Dept: CARDIOLOGY CLINIC | Age: 76
End: 2021-11-11
Payer: MEDICARE

## 2021-11-11 ENCOUNTER — OFFICE VISIT (OUTPATIENT)
Dept: CARDIOLOGY CLINIC | Age: 76
End: 2021-11-11
Payer: MEDICARE

## 2021-11-11 VITALS
SYSTOLIC BLOOD PRESSURE: 100 MMHG | OXYGEN SATURATION: 97 % | DIASTOLIC BLOOD PRESSURE: 60 MMHG | BODY MASS INDEX: 22.93 KG/M2 | HEART RATE: 59 BPM | RESPIRATION RATE: 16 BRPM | HEIGHT: 73 IN | WEIGHT: 173 LBS

## 2021-11-11 VITALS — HEIGHT: 73 IN | WEIGHT: 173 LBS | BODY MASS INDEX: 22.93 KG/M2

## 2021-11-11 DIAGNOSIS — Z98.890 S/P MVR (MITRAL VALVE REPAIR): ICD-10-CM

## 2021-11-11 DIAGNOSIS — I34.0 NON-RHEUMATIC MITRAL REGURGITATION: ICD-10-CM

## 2021-11-11 DIAGNOSIS — I48.20 CHRONIC ATRIAL FIBRILLATION (HCC): Primary | ICD-10-CM

## 2021-11-11 DIAGNOSIS — E78.00 HYPERCHOLESTEROLEMIA: ICD-10-CM

## 2021-11-11 DIAGNOSIS — I48.20 CHRONIC ATRIAL FIBRILLATION (HCC): ICD-10-CM

## 2021-11-11 DIAGNOSIS — G47.62 NOCTURNAL LEG CRAMPS: ICD-10-CM

## 2021-11-11 DIAGNOSIS — I34.1 MITRAL VALVE PROLAPSE: ICD-10-CM

## 2021-11-11 DIAGNOSIS — I42.8 VALVULAR CARDIOMYOPATHY (HCC): ICD-10-CM

## 2021-11-11 LAB
ECHO AO ASC DIAM: 3.79 CM
ECHO AO ROOT DIAM: 3.64 CM
ECHO AV AREA PEAK VELOCITY: 2.98 CM2
ECHO AV AREA VTI: 3.26 CM2
ECHO AV AREA/BSA PEAK VELOCITY: 1.5 CM2/M2
ECHO AV AREA/BSA VTI: 1.6 CM2/M2
ECHO AV MEAN GRADIENT: 2.04 MMHG
ECHO AV PEAK GRADIENT: 5.43 MMHG
ECHO AV PEAK VELOCITY: 116.56 CM/S
ECHO AV VTI: 21.9 CM
ECHO LA AREA 4C: 46.86 CM2
ECHO LA MAJOR AXIS: 6.16 CM
ECHO LA MINOR AXIS: 3.05 CM
ECHO LA VOL 2C: 207 ML (ref 18–58)
ECHO LA VOL 4C: 187.99 ML (ref 18–58)
ECHO LA VOL BP: 228.87 ML (ref 18–58)
ECHO LA VOL/BSA BIPLANE: 113.3 ML/M2 (ref 16–28)
ECHO LA VOLUME INDEX A2C: 102.48 ML/M2 (ref 16–28)
ECHO LA VOLUME INDEX A4C: 93.06 ML/M2 (ref 16–28)
ECHO LV E' LATERAL VELOCITY: 11.65 CM/S
ECHO LV E' SEPTAL VELOCITY: 8.06 CM/S
ECHO LV EDV A2C: 118.83 ML
ECHO LV EDV A4C: 122.1 ML
ECHO LV EDV BP: 121.76 ML (ref 67–155)
ECHO LV EDV INDEX A4C: 60.4 ML/M2
ECHO LV EDV INDEX BP: 60.3 ML/M2
ECHO LV EDV NDEX A2C: 58.8 ML/M2
ECHO LV EJECTION FRACTION A2C: 64 PERCENT
ECHO LV EJECTION FRACTION A4C: 55 PERCENT
ECHO LV EJECTION FRACTION BIPLANE: 59.3 PERCENT (ref 55–100)
ECHO LV ESV A2C: 42.61 ML
ECHO LV ESV A4C: 54.36 ML
ECHO LV ESV BP: 49.62 ML (ref 22–58)
ECHO LV ESV INDEX A2C: 21.1 ML/M2
ECHO LV ESV INDEX A4C: 26.9 ML/M2
ECHO LV ESV INDEX BP: 24.6 ML/M2
ECHO LV INTERNAL DIMENSION DIASTOLIC: 4.94 CM (ref 4.2–5.9)
ECHO LV INTERNAL DIMENSION SYSTOLIC: 3.63 CM
ECHO LV IVSD: 1.3 CM (ref 0.6–1)
ECHO LV MASS 2D: 242.9 G (ref 88–224)
ECHO LV MASS INDEX 2D: 120.3 G/M2 (ref 49–115)
ECHO LV POSTERIOR WALL DIASTOLIC: 1.2 CM (ref 0.6–1)
ECHO LVOT DIAM: 2.39 CM
ECHO LVOT PEAK GRADIENT: 2.42 MMHG
ECHO LVOT PEAK VELOCITY: 77.84 CM/S
ECHO LVOT SV: 71.4 ML
ECHO LVOT VTI: 15.98 CM
ECHO MV AREA VTI: 2.28 CM2
ECHO MV MAX VELOCITY: 116.97 CM/S
ECHO MV MEAN GRADIENT: 2.63 MMHG
ECHO MV PEAK GRADIENT: 5.48 MMHG
ECHO MV VTI: 31.27 CM
ECHO PV REGURGITANT MAX VELOCITY: 101.88 CM/S
ECHO RA AREA 4C: 47.37 CM2
ECHO RV INTERNAL DIMENSION: 4.97 CM
ECHO RV TAPSE: 1.84 CM (ref 1.5–2)
ECHO TV REGURGITANT MAX VELOCITY: 210.02 CM/S
ECHO TV REGURGITANT PEAK GRADIENT: 17.66 MMHG
LA VOL DISK BP: 208.19 ML (ref 18–58)

## 2021-11-11 PROCEDURE — 93005 ELECTROCARDIOGRAM TRACING: CPT | Performed by: SPECIALIST

## 2021-11-11 PROCEDURE — 99214 OFFICE O/P EST MOD 30 MIN: CPT | Performed by: SPECIALIST

## 2021-11-11 PROCEDURE — 1101F PT FALLS ASSESS-DOCD LE1/YR: CPT | Performed by: SPECIALIST

## 2021-11-11 PROCEDURE — 3017F COLORECTAL CA SCREEN DOC REV: CPT | Performed by: SPECIALIST

## 2021-11-11 PROCEDURE — G8432 DEP SCR NOT DOC, RNG: HCPCS | Performed by: SPECIALIST

## 2021-11-11 PROCEDURE — G0463 HOSPITAL OUTPT CLINIC VISIT: HCPCS | Performed by: SPECIALIST

## 2021-11-11 PROCEDURE — G8420 CALC BMI NORM PARAMETERS: HCPCS | Performed by: SPECIALIST

## 2021-11-11 PROCEDURE — G8427 DOCREV CUR MEDS BY ELIG CLIN: HCPCS | Performed by: SPECIALIST

## 2021-11-11 PROCEDURE — 93306 TTE W/DOPPLER COMPLETE: CPT | Performed by: SPECIALIST

## 2021-11-11 PROCEDURE — 93010 ELECTROCARDIOGRAM REPORT: CPT | Performed by: SPECIALIST

## 2021-11-11 PROCEDURE — G8536 NO DOC ELDER MAL SCRN: HCPCS | Performed by: SPECIALIST

## 2021-11-11 NOTE — PROGRESS NOTES
Sophia Fleming     1945       Alba Lopez MD, [facility]  Date of Visit-11/11/2021   PCP is Ava Wesley MD   [facility]  [facility]  HPI:  Sophia Fleming is a 77 y.o. male   6 month f/u from . Fu of  chronic a-fib and he has prior mitral valve repair. Previously taken care of in [place], [place] by [address] [place] [address], saw Dr. Barron Lazo from 09/14/16 indicates patient has chronic atrial fibrillation with moderate MR and history of mitral valve repair. Drew Carey had a TERI, mitral  leaflets with severe posterior mitral valve prolapse and probably flail middle scallop with moderate to severe eccentric MR and moderate TR.  The left atrium was moderately dilated as of 03/05/13. Drew Carey then underwent surgery on 04/15/13 by Dr. Maryann Weiner.       He had a mitral valve repair with a #32 [address] [place] with a right quadrangular repair of posterior leaflet. Drew Carey had a follow up echo 08/06/13, which showed an EF of 45%, mild cardiomyopathy, normal mitral valve ring.  Follow up echo 09/16/? showed an EF 50-55%, mild MR and annuloplasty was intact.  He has chronic atrial fibrillation.  Holter monitor showed frequent PVCs 03/07/16  nuclear stress test 7/24/19, normal blood flow with an EF of 56%.     chronic a-fib and he has prior mitral valve repair.  Previously taken care of in [place], [place] by [address] [place] [address], saw Dr. Barron Lazo from 09/14/16 indicates patient has chronic atrial fibrillation with moderate MR and history of mitral valve repair. Drew Carey had a TERI, mitral  leaflets with severe posterior mitral valve prolapse and probably flail middle scallop with moderate to severe eccentric MR and moderate TR.  The left atrium was moderately dilated as of 03/05/13. Drew Carey then underwent surgery on 04/15/13 by Dr. Maryann Weiner.       He had a mitral valve repair with a #32 [address] [place] with a right quadrangular repair of posterior leaflet.  He had a follow up echo 08/06/13, which showed an EF of 45%, mild cardiomyopathy, normal mitral valve ring.  Follow up echo 09/16/? showed an EF 50-55%, mild MR and annuloplasty was intact.  He has chronic atrial fibrillation.  Holter monitor showed frequent PVCs 03/07/16  nuclear stress test 7/24/19, normal blood flow with an EF of 56%. Today. .. Pt was in ER on 10/9/21 with arm injury. Is on Eliquis chronically for Afib. He has had repair of Mitral valve. Echo done today prelim shows successful repair with normal EF. Pt states that he has had some leg cramps at night. Overall the pt states he is doing well. He notes that he has been drinking tonic water and has had some neuropathy. He states that he has given up sugar for the last year and has felt great. 26# weight loss as gave up sugar  Denies chest pain, edema, syncope or shortness of breath at rest, has no tachycardia, palpitations or sense of arrhythmia. EKG: Atrial flutter-fibrillation   -Nonspecific QRS widening and anterior fascicular block. Assessment/Plan:     Patient Instructions   We will see you back for an annual follow up. 1. Chronic atrial fibrillation (HCC)  Rate control is good, now on [address] [place] [address], getting from [place], no bleeding.   - AMB POC EKG ROUTINE W/ 12 LEADS, INTER & REP    2. Non-rheumatic mitral regurgitation  Successful repair 2013  severe posterior mitral valve prolapse and probably flail middle scallop with moderate to severe eccentric MR   #32 [address] [place] with a right quadrangular repair of posterior leaflet. 3. Mitral valve prolapse  Last echo with good result, repeat in 6 months since will be 2 years  BP even in both arms. 3. Mitral valve prolapse  Echo today. 11/11/21    ECHO ADULT COMPLETE 11/11/2021 11/11/2021    Interpretation Summary  · The underlying rhythm is atrial fibrillation with a ventricular response of 56-73 bpm.  · LV: Estimated LVEF is 55 - 60%. Biplane method used to measure ejection fraction.  Normal cavity size and systolic function (ejection fraction normal). Mild concentric hypertrophy. Wall motion: normal.  · RV: Mildly dilated right ventricle. · MV: Thickened Mitral valve repaired with annuloplasty ring. Mitral valve mean gradient is 2.6 mmHg. Mild mitral valve regurgitation is present. · PV: Mild pulmonic valve regurgitation is present. · TV: Mild to nearing moderate tricuspid valve regurgitation is present. · LA: Severely dilated left atrium. Left Atrium volume index is 113 mL/m2. · RA: Severely dilated right atrium. · Results were discussed with the patient at an associated office visit. Signed by: Javid Caballero MD on 11/11/2021  5:39 PM      4. Hypercholesterolemia  At goal , denies excess muscle aches or new liver issues  Improved from 89 without meds and now in past 2 months has good intentional weight loss   At goal , denies excess muscle aches or new liver issues  Key Antihyperlipidemia Meds     The patient is on no antihyperlipidemia meds. Lab Results   Component Value Date/Time    LDL, calculated 71 04/17/2020 09:45 AM      5. Valvular cardiomyopathy (HCC)  *EF normal, no CHF   EF reduced due to MR/MVP now normal after surgery. 6. S/P MVR (mitral valve repair)  04/15/13     7. Nocturnal leg cramps  Seeing Neuro and trying OTC  Does not seem like claudication    F/u in 1 year     Impression:   1. Chronic atrial fibrillation ([address]    2. Non-rheumatic mitral regurgitation    3. Mitral valve prolapse    4. Hypercholesterolemia    5. Valvular cardiomyopathy ([address]    6. S/P MVR (mitral valve repair)    7. Nocturnal leg cramps       Future Appointments   Date Time Provider Tejas Harperi   11/17/2022  1:00 PM MD ERIC Christianson  AMB      Cardiac History:   chronic a-fib and he has prior mitral valve repair.  Previously taken care of in [place], [place] by [address] [place] [address], saw Dr. Liz De La Fuente from 09/14/16 indicates patient has chronic atrial fibrillation with moderate MR and history of mitral valve repair. Minal Garcia had a TERI, mitral  leaflets with severe posterior mitral valve prolapse and probably flail middle scallop with moderate to severe eccentric MR and moderate TR.  The left atrium was moderately dilated as of 03/05/13. Minal Garcia then underwent surgery on 04/15/13 by Dr. Nicolás Buckner.      He had a mitral valve repair with a #32 [address] with a right quadrangular repair of posterior leaflet. Minal Garcia had a follow up echo 08/06/13, which showed an EF of 45%, mild cardiomyopathy, normal mitral valve ring.  Follow up echo 09/16/? showed an EF 50-55%, mild MR and annuloplasty was intact.  He has chronic atrial fibrillation.  Holter monitor showed frequent PVCs 03/07/16  nuclear stress test 7/24/19, normal blood flow with an EF of 56%.   03/20/19   ECHO ADULT COMPLETE 04/01/2019 4/1/2019  Narrative  · Calculated left ventricular ejection fraction is 59%. Biplane method used to measure ejection fraction. Left ventricular mild concentric hypertrophy. No regional wall motion abnormality noted. · Left atrial cavity size is severely dilated. · Right atrial cavity size is severely dilated. · Mild aortic valve regurgitation is present. · Mitral valve repaired with annuloplasty ring. Trace mitral valve regurgitation. · Mild tricuspid valve regurgitation is present. There is no evidence of pulmonary hypertension. · Mild pulmonic valve regurgitation is present. Signed by: Azra Ag MD     No future appointments. ROS-except as noted above. . A complete cardiac and respiratory are reviewed and negative except as above ; Resp-denies wheezing  or productive cough,.  Const- No unusual weight loss or fever; Neuro-no recent seizure or CVA ; GI- No BRBPR, abdom pain, bloating ; - no  hematuria   see supplement sheet, initialed and to be scanned by staff  Past Medical History:   Diagnosis Date    Atrial fibrillation ([address] 07/29/2013    Holter 12/9/13    DDD (degenerative disc disease), lumbar     Diverticulosis     ED (erectile dysfunction) 04/08/2014    Enlarged prostate 2014    psa 1.02 9/2017    Grief reaction     son was murdered 2017    History of mitral valve repair 2013    echo 3/2019 stabkle. EF 50-55% mildly increased valvue pressure 11/2017    History of obstructive sleep apnea     states resolved after mitral replacement 2013    Hypercholesterolemia 04/08/2014    Hyperplastic colon polyp 2013    Idiopathic peripheral neuropathy     did not tolerate gabapentin, lyrica, cymbalta. tried Nutrix    Long term (current) use of anticoagulants     Plantar fasciitis     Retinal disease     Dr. Martin Comp. hx laser tx    Vitamin B12 deficiency     injections      Social Hx= reports that he has never smoked. He has never used smokeless tobacco. He reports current alcohol use of about 7.0 standard drinks of alcohol per week. He reports that he does not use drugs. Exam and Labs:  /60 (BP 1 Location: Right arm)   Pulse (!) 59   Resp 16   Ht 6' 1\" (1.854 m)   Wt 173 lb (78.5 kg)   SpO2 97%   BMI 22.82 kg/m² Constitutional:  NAD, comfortable  Head: NC,AT. Eyes: No scleral icterus. Neck:  Neck supple. No JVD present. Throat: moist mucous membranes. Chest: Effort normal & normal respiratory excursion . Neurological: alert, conversant and oriented . Skin: Skin is not cold. No obvious systemic rash noted. Not diaphoretic. No erythema. Psychiatric:  Grossly normal mood and affect. Behavior appears normal. Extremities:  no clubbing or cyanosis. Abdomen: non distended    Lungs:breath sounds normal. No stridor. distress, wheezes or  Rales. Heart:IRIR normal S1, S2, no murmurs, rubs, clicks or gallops , PMI non displaced. Edema: Edema is none.   Lab Results   Component Value Date/Time    Cholesterol, total 146 04/17/2020 09:45 AM    HDL Cholesterol 67 04/17/2020 09:45 AM    LDL, calculated 71 04/17/2020 09:45 AM    Triglyceride 40 04/17/2020 09:45 AM    CHOL/HDL Ratio 2.2 04/17/2020 09:45 AM Lab Results   Component Value Date/Time    Sodium 141 04/17/2020 09:45 AM    Potassium 4.4 04/17/2020 09:45 AM    Chloride 108 04/17/2020 09:45 AM    CO2 30 04/17/2020 09:45 AM    Anion gap 3 (L) 04/17/2020 09:45 AM    Glucose 93 04/17/2020 09:45 AM    BUN 22 (H) 04/17/2020 09:45 AM    Creatinine 0.86 04/17/2020 09:45 AM    BUN/Creatinine ratio 26 (H) 04/17/2020 09:45 AM    GFR est AA >60 04/17/2020 09:45 AM    GFR est non-AA >60 04/17/2020 09:45 AM    Calcium 9.0 04/17/2020 09:45 AM      Wt Readings from Last 3 Encounters:   11/11/21 173 lb (78.5 kg)   11/11/21 173 lb (78.5 kg)   10/09/21 177 lb 0.5 oz (80.3 kg)      BP Readings from Last 3 Encounters:   11/11/21 100/60   10/09/21 129/75   05/05/21 102/60      Current Outpatient Medications   Medication Sig    Eliquis 5 mg tablet TAKE 1 TABLET(5 MG) BY MOUTH TWICE DAILY    gabapentin (NEURONTIN) 600 mg tablet Take 1 Tablet by mouth nightly. Max Daily Amount: 600 mg. (Patient taking differently: Take 600 mg by mouth as needed.)    OTHER 1 Cap two (2) times a day. neuragenix-dietary supplement for neuropathy.  vit E acet/vit Bcomp,C/zinc (Z-BEC PO) Take  by mouth daily.  cyanocobalamin (VITAMIN B12) 1,000 mcg/mL injection INJECT 1 ML IN THE MUSCLE EVERY 30 DAYS. (Patient taking differently: 1,000 mcg by IntraMUSCular route.)    tadalafil (CIALIS) 20 mg tablet Take 1 Tab by mouth as needed (ED).  magnesium 250 mg tab Take 300 mg by mouth daily. No current facility-administered medications for this visit. Impression see above.       Written by Juan Carlos Lees, as dictated by Alfredo Hamilton MD.

## 2021-11-11 NOTE — Clinical Note
11/11/2021    Patient: Sudhakarulises [facility]   YOB: 1945   Date of Visit: 11/11/2021     Kaitlin Mazariegos, [address]  Via In Basket    Dear Kaitlin Mazariegos MD,      Thank you for referring Mr. Lindsey Ortega to [address]  for evaluation. My notes for this consultation are attached. If you have questions, please do not hesitate to call me. I look forward to following your patient along with you.       Sincerely,    Riya Gottlieb MD

## 2021-11-30 ENCOUNTER — TELEPHONE (OUTPATIENT)
Dept: CARDIOLOGY CLINIC | Age: 76
End: 2021-11-30

## 2021-11-30 DIAGNOSIS — I48.0 PAROXYSMAL ATRIAL FIBRILLATION (HCC): ICD-10-CM

## 2021-11-30 NOTE — TELEPHONE ENCOUNTER
Patient called to inform that he is currently in the donut hole and is requesting samples of Eliquis to last until the new year.        Phone: [phone]

## 2021-12-02 NOTE — TELEPHONE ENCOUNTER
Verified patient with two types of identifiers. Let patient know he may  samples at his convenience. Patient verbalized understanding and appreciation.      Future Appointments   Date Time Provider Tejas Garrett   11/17/2022  1:00 PM MD ERIC Brannon AMB

## 2022-03-18 PROBLEM — I34.0 NON-RHEUMATIC MITRAL REGURGITATION: Status: ACTIVE | Noted: 2020-10-12

## 2022-03-19 PROBLEM — I42.8 VALVULAR CARDIOMYOPATHY (HCC): Status: ACTIVE | Noted: 2020-10-12

## 2022-05-02 ENCOUNTER — OFFICE VISIT (OUTPATIENT)
Dept: INTERNAL MEDICINE CLINIC | Age: 77
End: 2022-05-02
Payer: MEDICARE

## 2022-05-02 VITALS
OXYGEN SATURATION: 97 % | BODY MASS INDEX: 23.22 KG/M2 | TEMPERATURE: 98.3 F | HEIGHT: 73 IN | DIASTOLIC BLOOD PRESSURE: 66 MMHG | RESPIRATION RATE: 15 BRPM | SYSTOLIC BLOOD PRESSURE: 120 MMHG | WEIGHT: 175.2 LBS | HEART RATE: 70 BPM

## 2022-05-02 DIAGNOSIS — I42.8 VALVULAR CARDIOMYOPATHY (HCC): ICD-10-CM

## 2022-05-02 DIAGNOSIS — I48.20 CHRONIC ATRIAL FIBRILLATION (HCC): ICD-10-CM

## 2022-05-02 DIAGNOSIS — E78.00 HYPERCHOLESTEROLEMIA: ICD-10-CM

## 2022-05-02 DIAGNOSIS — N40.0 ENLARGED PROSTATE: ICD-10-CM

## 2022-05-02 DIAGNOSIS — Z00.00 MEDICARE ANNUAL WELLNESS VISIT, SUBSEQUENT: Primary | ICD-10-CM

## 2022-05-02 PROCEDURE — G8420 CALC BMI NORM PARAMETERS: HCPCS | Performed by: INTERNAL MEDICINE

## 2022-05-02 PROCEDURE — G8536 NO DOC ELDER MAL SCRN: HCPCS | Performed by: INTERNAL MEDICINE

## 2022-05-02 PROCEDURE — G8427 DOCREV CUR MEDS BY ELIG CLIN: HCPCS | Performed by: INTERNAL MEDICINE

## 2022-05-02 PROCEDURE — G8510 SCR DEP NEG, NO PLAN REQD: HCPCS | Performed by: INTERNAL MEDICINE

## 2022-05-02 PROCEDURE — 1101F PT FALLS ASSESS-DOCD LE1/YR: CPT | Performed by: INTERNAL MEDICINE

## 2022-05-02 PROCEDURE — G0439 PPPS, SUBSEQ VISIT: HCPCS | Performed by: INTERNAL MEDICINE

## 2022-05-02 NOTE — PROGRESS NOTES
This is a Subsequent Medicare Annual Wellness Visit providing Personalized Prevention Plan Services (PPPS) (Performed 12 months after initial AWV and PPPS )    I have reviewed the patient's medical history in detail and updated the computerized patient record. History     Past Medical History:   Diagnosis Date    Atrial fibrillation ([address] 07/29/2013    Holter 12/9/13    DDD (degenerative disc disease), lumbar     Diverticulosis     ED (erectile dysfunction) 04/08/2014    Enlarged prostate 2014    psa 1.02 9/2017    Grief reaction     son was murdered 2017    History of mitral valve repair 2013    echo 3/2019 stabkle. EF 50-55% mildly increased valvue pressure 11/2017    History of obstructive sleep apnea     states resolved after mitral replacement 2013    Hypercholesterolemia 04/08/2014    Hyperplastic colon polyp 2013    Idiopathic peripheral neuropathy     did not tolerate gabapentin, lyrica, cymbalta. tried Nutrix    Long term (current) use of anticoagulants     Plantar fasciitis     Retinal disease     Dr. Jolanta Mohr. hx laser tx    Vitamin B12 deficiency     injections       Past Surgical History:   Procedure Laterality Date    HX COLONOSCOPY  04/03/2013    2 hyperplastic polyps    HX COLONOSCOPY  04/30/2018    2 polyps. Dr. Linda Atkins  03/05/2013    [place]  normal coronaries, severe MR    HX LUMBAR FUSION  1984    L5-S1    HX MITRAL VALVULOPLASTY  2013    Mitral Valve Annuloplasty    HX ORTHOPAEDIC  2009    Shoulder       Current Outpatient Medications   Medication Sig    Eliquis 5 mg tablet TAKE 1 TABLET(5 MG) BY MOUTH TWICE DAILY    gabapentin (NEURONTIN) 600 mg tablet Take 1 Tablet by mouth nightly. Max Daily Amount: 600 mg. (Patient taking differently: Take 600 mg by mouth as needed.)    OTHER 1 Cap two (2) times a day. neuragenix-dietary supplement for neuropathy.  vit E acet/vit Bcomp,C/zinc (Z-BEC PO) Take  by mouth daily.     cyanocobalamin (VITAMIN B12) 1,000 mcg/mL injection INJECT 1 ML IN THE MUSCLE EVERY 30 DAYS. (Patient taking differently: 1,000 mcg by IntraMUSCular route.)    tadalafil (CIALIS) 20 mg tablet Take 1 Tab by mouth as needed (ED).  magnesium 250 mg tab Take 300 mg by mouth daily. No current facility-administered medications for this visit. Allergies   Allergen Reactions    Beta Blocker [Beta-Blockers (Beta-Adrenergic Blocking Agts)] Nausea and Vomiting       History reviewed. No pertinent family history. reports that he has never smoked. He has never used smokeless tobacco.   reports current alcohol use of about 7.0 standard drinks of alcohol per week. Depression Risk Factor Screening:       Alcohol Risk Factor Screening: On any occasion during the past 3 months, have you had more than 3 drinks containing alcohol? No    Do you average more than 14 drinks per week? No      Functional Ability and Level of Safety:     Hearing Loss   mild    Activities of Daily Living   Self-care. Requires assistance with: no ADLs    Fall Risk     Fall Risk Assessment, last 12 mths 5/2/2022   Able to walk? Yes   Fall in past 12 months? 0   Do you feel unsteady? 0   Are you worried about falling 0         Abuse Screen   Patient is not abused    Review of Systems   A comprehensive review of systems was negative except for that written in the HPI. Physical Examination     Evaluation of Cognitive Function:  Mood/affect:  neutral, happy  Appearance: age appropriate  Family member/caregiver input: none    Blood pressure 120/66, pulse 70, temperature 98.3 °F (36.8 °C), temperature source Oral, resp. rate 15, height 6' 1\" (1.854 m), weight 175 lb 3.2 oz (79.5 kg), SpO2 97 %.   General appearance: alert, cooperative, no distress, appears stated age  Neck: supple, symmetrical, trachea midline, no adenopathy, thyroid: not enlarged, symmetric, no tenderness/mass/nodules, no carotid bruit and no JVD  Lungs: clear to auscultation bilaterally  Heart: regular rate and rhythm, S1, S2 normal, no murmur, click, rub or gallop  Extremities: extremities normal, atraumatic, no cyanosis or edema    Patient Care Team:  Rosa Feliz MD as PCP - General (Internal Medicine)  Rosa Feliz MD as PCP - [address] [place] [address] [facility]  Joan Rothman MD (Cardiology)      Advice/Referrals/Counseling   Education and counseling provided. See below for specific orders    Assessment/Plan       Diagnoses and all orders for this visit:    1. Medicare annual wellness visit, subsequent  ACP reviewed. Primary medical decision maker reviewed with patient and updated in chart. he is otherwise up-to-date or have declined preventative services except for those ordered below. 2. Valvular cardiomyopathy ([facility] [address]  This condition appears to be stable and is being evaluated and managed by his specialist Dr. Rachelle Louie.   No acute findings today warrant change in management plan    3. Chronic atrial fibrillation (HCC)  This condition appears to be stable and is being evaluated and managed by his specialist Dr. Rachelle Louie.   No acute findings today warrant change in management plan    4. Hypercholesterolemia  This condition is controlled on current medication regimen as written in medication list.  Medications refilled  -     METABOLIC PANEL, COMPREHENSIVE; Future  -     CBC W/O DIFF; Future  -     LIPID PANEL; Future    5. Enlarged prostate- JULIO CESAR not performed  -     PSA W/ REFLX FREE PSA; Future      Potential medication side effects were discussed with the patient; let me know if any occur.   Return for yearly Annual Wellness Visits

## 2022-05-03 LAB
ALBUMIN SERPL-MCNC: 4.2 G/DL (ref 3.5–5)
ALBUMIN/GLOB SERPL: 1.4 {RATIO} (ref 1.1–2.2)
ALP SERPL-CCNC: 94 U/L (ref 45–117)
ALT SERPL-CCNC: 40 U/L (ref 12–78)
ANION GAP SERPL CALC-SCNC: 6 MMOL/L (ref 5–15)
AST SERPL-CCNC: 40 U/L (ref 15–37)
BILIRUB SERPL-MCNC: 1.1 MG/DL (ref 0.2–1)
BUN SERPL-MCNC: 21 MG/DL (ref 6–20)
BUN/CREAT SERPL: 21 (ref 12–20)
CALCIUM SERPL-MCNC: 9.7 MG/DL (ref 8.5–10.1)
CHLORIDE SERPL-SCNC: 105 MMOL/L (ref 97–108)
CHOLEST SERPL-MCNC: 159 MG/DL
CO2 SERPL-SCNC: 29 MMOL/L (ref 21–32)
CREAT SERPL-MCNC: 0.99 MG/DL (ref 0.7–1.3)
ERYTHROCYTE [DISTWIDTH] IN BLOOD BY AUTOMATED COUNT: 13.2 % (ref 11.5–14.5)
GLOBULIN SER CALC-MCNC: 3.1 G/DL (ref 2–4)
GLUCOSE SERPL-MCNC: 103 MG/DL (ref 65–100)
HCT VFR BLD AUTO: 43.2 % (ref 36.6–50.3)
HDLC SERPL-MCNC: 73 MG/DL
HDLC SERPL: 2.2 {RATIO} (ref 0–5)
HGB BLD-MCNC: 13.3 G/DL (ref 12.1–17)
LDLC SERPL CALC-MCNC: 78.4 MG/DL (ref 0–100)
MCH RBC QN AUTO: 32.6 PG (ref 26–34)
MCHC RBC AUTO-ENTMCNC: 30.8 G/DL (ref 30–36.5)
MCV RBC AUTO: 105.9 FL (ref 80–99)
NRBC # BLD: 0 K/UL (ref 0–0.01)
NRBC BLD-RTO: 0 PER 100 WBC
PLATELET # BLD AUTO: 159 K/UL (ref 150–400)
PMV BLD AUTO: 12.2 FL (ref 8.9–12.9)
POTASSIUM SERPL-SCNC: 4.1 MMOL/L (ref 3.5–5.1)
PROT SERPL-MCNC: 7.3 G/DL (ref 6.4–8.2)
RBC # BLD AUTO: 4.08 M/UL (ref 4.1–5.7)
SODIUM SERPL-SCNC: 140 MMOL/L (ref 136–145)
TRIGL SERPL-MCNC: 38 MG/DL (ref ?–150)
VLDLC SERPL CALC-MCNC: 7.6 MG/DL
WBC # BLD AUTO: 5.9 K/UL (ref 4.1–11.1)

## 2022-05-04 LAB
PSA SERPL-MCNC: 1.1 NG/ML (ref 0–4)
REFLEX CRITERIA: NORMAL

## 2022-10-03 ENCOUNTER — TELEPHONE (OUTPATIENT)
Dept: CARDIOLOGY CLINIC | Age: 77
End: 2022-10-03

## 2022-10-03 DIAGNOSIS — I48.0 PAROXYSMAL ATRIAL FIBRILLATION (HCC): ICD-10-CM

## 2022-10-03 NOTE — TELEPHONE ENCOUNTER
Verified patient with two types of identifiers. Let Mr. Kaykay Butler know he may  samples at his convenience. He was appreciative. Confirmed follow up.      Future Appointments   Date Time Provider Tejas Garrett   11/17/2022  1:00 PM MD ERIC Anthony AMB

## 2022-10-03 NOTE — TELEPHONE ENCOUNTER
Pt would like to know if samples are available for eliquis 5mg, pt is out of medication      [phone]

## 2022-10-26 ENCOUNTER — TELEPHONE (OUTPATIENT)
Dept: CARDIOLOGY CLINIC | Age: 77
End: 2022-10-26

## 2022-10-26 NOTE — TELEPHONE ENCOUNTER
Patient has requested samples of Eliquis, he stated that he had enough for this week and next week.  I verified his cell # [phone]

## 2022-10-31 NOTE — TELEPHONE ENCOUNTER
Verified patient with two types of identifiers. Let Mr. Carlos Degroot know he may come at his convenience to  samples. He was appreciative.

## 2022-11-17 ENCOUNTER — HOSPITAL ENCOUNTER (OUTPATIENT)
Dept: GENERAL RADIOLOGY | Age: 77
Discharge: HOME | End: 2022-11-17
Attending: SPECIALIST
Payer: MEDICARE

## 2022-11-17 ENCOUNTER — OFFICE VISIT (OUTPATIENT)
Dept: CARDIOLOGY CLINIC | Age: 77
End: 2022-11-17
Payer: MEDICARE

## 2022-11-17 VITALS
HEART RATE: 65 BPM | RESPIRATION RATE: 16 BRPM | BODY MASS INDEX: 23.86 KG/M2 | SYSTOLIC BLOOD PRESSURE: 110 MMHG | OXYGEN SATURATION: 98 % | DIASTOLIC BLOOD PRESSURE: 60 MMHG | HEIGHT: 73 IN | WEIGHT: 180 LBS

## 2022-11-17 DIAGNOSIS — I48.20 CHRONIC ATRIAL FIBRILLATION (HCC): Primary | ICD-10-CM

## 2022-11-17 DIAGNOSIS — I34.0 NON-RHEUMATIC MITRAL REGURGITATION: ICD-10-CM

## 2022-11-17 DIAGNOSIS — I42.8 VALVULAR CARDIOMYOPATHY (HCC): ICD-10-CM

## 2022-11-17 DIAGNOSIS — I34.1 MITRAL VALVE PROLAPSE: ICD-10-CM

## 2022-11-17 DIAGNOSIS — G47.62 NOCTURNAL LEG CRAMPS: ICD-10-CM

## 2022-11-17 DIAGNOSIS — Z98.890 S/P MVR (MITRAL VALVE REPAIR): ICD-10-CM

## 2022-11-17 DIAGNOSIS — E78.00 HYPERCHOLESTEROLEMIA: ICD-10-CM

## 2022-11-17 DIAGNOSIS — I48.0 PAROXYSMAL ATRIAL FIBRILLATION (HCC): ICD-10-CM

## 2022-11-17 PROCEDURE — G8427 DOCREV CUR MEDS BY ELIG CLIN: HCPCS | Performed by: SPECIALIST

## 2022-11-17 PROCEDURE — G8420 CALC BMI NORM PARAMETERS: HCPCS | Performed by: SPECIALIST

## 2022-11-17 PROCEDURE — G8536 NO DOC ELDER MAL SCRN: HCPCS | Performed by: SPECIALIST

## 2022-11-17 PROCEDURE — 1101F PT FALLS ASSESS-DOCD LE1/YR: CPT | Performed by: SPECIALIST

## 2022-11-17 PROCEDURE — 99214 OFFICE O/P EST MOD 30 MIN: CPT | Performed by: SPECIALIST

## 2022-11-17 PROCEDURE — 1123F ACP DISCUSS/DSCN MKR DOCD: CPT | Performed by: SPECIALIST

## 2022-11-17 PROCEDURE — 93000 ELECTROCARDIOGRAM COMPLETE: CPT | Performed by: SPECIALIST

## 2022-11-17 PROCEDURE — G9717 DOC PT DX DEP/BP F/U NT REQ: HCPCS | Performed by: SPECIALIST

## 2022-11-17 PROCEDURE — 71046 X-RAY EXAM CHEST 2 VIEWS: CPT

## 2022-11-17 NOTE — PROGRESS NOTES
Lb Herrera     1945       Alba Brandt MD, [facility]  Date of Visit-11/17/2022   PCP is Bravo Her MD   [facility] [place]  [facility]  HPI:  Lb Herrera is a 69 y.o. male   One year follow up     Atrial fibrillation chronic   Prior mitral valve repair. s/p surgery on 04/15/13; TERI 3/05/13=  mitral  leaflets with severe posterior mitral valve prolapse and probably flail middle scallop with moderate to severe eccentric MR and moderate TR. mod LAE  Mitral valve repair with a #32 [place] with a right quadrangular repair of posterior leaflet. Valvular cardiomyopathy Echo 08/06/13, EF of 45%,   Holter monitor showed frequent PVCs 03/07/16  Nuclear stress test 7/24/19, normal blood flow with an EF of 56%. Today. .. Mild dizziness, night cramps, mild edema , blood in mouth at night one time  Denies chest pain, syncope or shortness of breath at rest   Wants to take [employer] vitamin for leg cramps  Has no tachycardia , palpitations or sense of arrythmia    EKG: Atrial flutter-fibrillation PVC, left axis, QRS WNL  Assessment/Plan:     Patient Instructions   Please have your Chest xray today. Please call [place] [facility] at [phone] for an appointment. [facility]    We will see you back for an annual echo one week prior to your office visit. 1. Chronic atrial fibrillation ([address]  Rate control is good, now on [address], getting from [place]  Mouth bleed, check CXR ,see ENT    Addendum: 12/20/2022 1:06 PM   XR Results (most recent):  Results from Hospital Encounter encounter on 11/17/22    XR CHEST PA LAT    Narrative  EXAM: XR CHEST PA LAT    INDICATION: afib    COMPARISON: None. FINDINGS: PA and lateral radiographs of the chest demonstrate clear lungs and  pleural margins. Median sternotomy wires are shown. Mild cardiac contour  enlargement and minimal thoracic aortic tortuosity are shown.  Mediastinal and  hilar contours are otherwise normal. The bones are mildly osteopenic. .    Impression  No acute findings. - AMB POC EKG ROUTINE W/ 12 LEADS, INTER & REP    2. Non-rheumatic mitral regurgitation  Successful repair 2013  severe posterior mitral valve prolapse and probably flail middle scallop with moderate to severe eccentric MR   #32 [address] with a right quadrangular repair of posterior leaflet. 3. Mitral valve prolapse  Last echo with good result  BP even in both arms. 3. Mitral valve prolapse  Echo   11/11/21    ECHO ADULT COMPLETE 11/11/2021 11/11/2021    Interpretation Summary  · The underlying rhythm is atrial fibrillation with a ventricular response of 56-73 bpm.  · LV: Estimated LVEF is 55 - 60%. Biplane method used to measure ejection fraction. Normal cavity size and systolic function (ejection fraction normal). Mild concentric hypertrophy. Wall motion: normal.  · RV: Mildly dilated right ventricle. · MV: Thickened Mitral valve repaired with annuloplasty ring. Mitral valve mean gradient is 2.6 mmHg. Mild mitral valve regurgitation is present. · PV: Mild pulmonic valve regurgitation is present. · TV: Mild to nearing moderate tricuspid valve regurgitation is present. · LA: Severely dilated left atrium. Left Atrium volume index is 113 mL/m2. · RA: Severely dilated right atrium. · Results were discussed with the patient at an associated office visit. Signed by: Jadiel Davison MD on 11/11/2021  5:39 PM      4. Hypercholesterolemia  At goal , denies excess muscle aches or new liver issues  Improved from 89 without meds and now in past 2 months has good intentional weight loss   At goal , denies excess muscle aches or new liver issues  Key Antihyperlipidemia Meds       The patient is on no antihyperlipidemia meds.            Lab Results   Component Value Date/Time    LDL, calculated 78.4 05/02/2022 04:14 PM      5. Valvular cardiomyopathy (HCC)  *EF normal, no CHF   EF reduced due to MR/MVP now normal after surgery. 6. S/P MVR (mitral valve repair)  04/15/13     7. Nocturnal leg cramps  Seeing Neuro and trying OTC  Does not seem like claudication     Impression:   1. Chronic atrial fibrillation ([address]    2. Non-rheumatic mitral regurgitation    3. Mitral valve prolapse    4. Hypercholesterolemia    5. Valvular cardiomyopathy ([address]    6. S/P MVR (mitral valve repair)    7. Nocturnal leg cramps       No future appointments. Cardiac History:   chronic a-fib and he has prior mitral valve repair. Previously taken care of in [place], [place] by [address] [place] [address], saw Dr. Brian Salas. Visit from 09/14/16 indicates patient has chronic atrial fibrillation with moderate MR and history of mitral valve repair. He had a TERI, mitral  leaflets with severe posterior mitral valve prolapse and probably flail middle scallop with moderate to severe eccentric MR and moderate TR. The left atrium was moderately dilated as of 03/05/13. He then underwent surgery on 04/15/13 by Dr. Lily Carbone. He had a mitral valve repair with a #32 [address] [place] with a right quadrangular repair of posterior leaflet. He had a follow up echo 08/06/13, which showed an EF of 45%, mild cardiomyopathy, normal mitral valve ring. Follow up echo 09/16/? showed an EF 50-55%, mild MR and annuloplasty was intact. He has chronic atrial fibrillation. Holter monitor showed frequent PVCs 03/07/16  nuclear stress test 7/24/19, normal blood flow with an EF of 56%.   03/20/19   ECHO ADULT COMPLETE 04/01/2019 4/1/2019  Narrative  · Calculated left ventricular ejection fraction is 59%. Biplane method used to measure ejection fraction. Left ventricular mild concentric hypertrophy. No regional wall motion abnormality noted. · Left atrial cavity size is severely dilated. · Right atrial cavity size is severely dilated. · Mild aortic valve regurgitation is present. · Mitral valve repaired with annuloplasty ring. Trace mitral valve regurgitation. · Mild tricuspid valve regurgitation is present. There is no evidence of pulmonary hypertension. · Mild pulmonic valve regurgitation is present. Signed by: Jose Jacob MD     No future appointments. ROS-except as noted above. . A complete cardiac and respiratory are reviewed and negative except as above ; Resp-denies wheezing  or productive cough,. Const- No unusual weight loss or fever; Neuro-no recent seizure or CVA ; GI- No BRBPR, abdom pain, bloating ; - no  hematuria   see supplement sheet, initialed and to be scanned by staff  Past Medical History:   Diagnosis Date    Atrial fibrillation ([address] 07/29/2013    Dr. Daniel Brandt.  Holter 12/9/13    DDD (degenerative disc disease), lumbar     Diverticulosis     ED (erectile dysfunction) 04/08/2014    Enlarged prostate 2014    psa 1.02 9/2017    Grief reaction     son was murdered 2017    History of mitral valve repair 2013    echo 3/2019 stabkle. EF 50-55% mildly increased valvue pressure 11/2017    History of obstructive sleep apnea     states resolved after mitral replacement 2013    Hypercholesterolemia 04/08/2014    Hyperplastic colon polyp 2013    Idiopathic peripheral neuropathy     did not tolerate gabapentin, lyrica, cymbalta. tried Nutrix    Long term (current) use of anticoagulants     Plantar fasciitis     Retinal disease     Dr. Marely Emanuel. hx laser tx    Vitamin B12 deficiency     injections      Social Hx= reports that he has never smoked. He has never used smokeless tobacco. He reports current alcohol use of about 7.0 standard drinks per week. He reports that he does not use drugs. Exam and Labs:  /60   Pulse 65   Resp 16   Ht 6' 1\" (1.854 m)   Wt 180 lb (81.6 kg)   SpO2 98%   BMI 23.75 kg/m² Constitutional:  NAD, comfortable  Head: NC,AT. Eyes: No scleral icterus. Neck:  Neck supple. No JVD present. Throat: moist mucous membranes. Chest: Effort normal & normal respiratory excursion . Neurological: alert, conversant and oriented . Skin: Skin is not cold. No obvious systemic rash noted. Not diaphoretic. No erythema. Psychiatric:  Grossly normal mood and affect. Behavior appears normal. Extremities:  no clubbing or cyanosis. Abdomen: non distended    Lungs:breath sounds normal. No stridor. distress, wheezes or  Rales. Heart:IRIR normal S1, S2, no murmurs, rubs, clicks or gallops , PMI non displaced. Edema: Edema is none. Lab Results   Component Value Date/Time    Cholesterol, total 159 05/02/2022 04:14 PM    HDL Cholesterol 73 05/02/2022 04:14 PM    LDL, calculated 78.4 05/02/2022 04:14 PM    Triglyceride 38 05/02/2022 04:14 PM    CHOL/HDL Ratio 2.2 05/02/2022 04:14 PM     Lab Results   Component Value Date/Time    Sodium 140 05/02/2022 04:14 PM    Potassium 4.1 05/02/2022 04:14 PM    Chloride 105 05/02/2022 04:14 PM    CO2 29 05/02/2022 04:14 PM    Anion gap 6 05/02/2022 04:14 PM    Glucose 103 (H) 05/02/2022 04:14 PM    BUN 21 (H) 05/02/2022 04:14 PM    Creatinine 0.99 05/02/2022 04:14 PM    BUN/Creatinine ratio 21 (H) 05/02/2022 04:14 PM    GFR est AA >60 05/02/2022 04:14 PM    GFR est non-AA >60 05/02/2022 04:14 PM    Calcium 9.7 05/02/2022 04:14 PM      Wt Readings from Last 3 Encounters:   11/17/22 180 lb (81.6 kg)   05/02/22 175 lb 3.2 oz (79.5 kg)   11/11/21 173 lb (78.5 kg)      BP Readings from Last 3 Encounters:   11/17/22 110/60   05/02/22 120/66   11/11/21 100/60      Current Outpatient Medications   Medication Sig    apixaban (Eliquis) 5 mg tablet Take 1 Tablet by mouth every twelve (12) hours. gabapentin (NEURONTIN) 600 mg tablet TAKE 1 TALBET BY MOUTH NIGHTLY. MAX DAILY AMOUNT: 600 MG    OTHER 1 Cap two (2) times a day. neuragenix-dietary supplement for neuropathy. vit E acet/vit Bcomp,C/zinc (Z-BEC PO) Take  by mouth daily.     cyanocobalamin (VITAMIN B12) 1,000 mcg/mL injection INJECT 1 ML IN THE MUSCLE EVERY 30 DAYS. (Patient taking differently: 1,000 mcg by IntraMUSCular route.)    tadalafil (CIALIS) 20 mg tablet Take 1 Tab by mouth as needed (ED).    magnesium 250 mg tab Take 300 mg by mouth daily. No current facility-administered medications for this visit. Impression see above.       Written by Keke Carrillo, as dictated by Leopold Linen, MD.

## 2022-11-17 NOTE — PATIENT INSTRUCTIONS
Please have your Chest xray today. Please call [address] [facility] at [phone] for an appointment. [address] [facility]    We will see you back for an annual echo one week prior to your office visit.

## 2022-11-17 NOTE — Clinical Note
12/20/2022    Patient: Meghann Kendall   YOB: 1945   Date of Visit: 11/17/2022     Fiordaliza García, [address] [facility]  Via In Basket    Dear Fiordaliza García MD,      Thank you for referring Mr. Rosemarie Lopez to [address] for evaluation. My notes for this consultation are attached. If you have questions, please do not hesitate to call me. I look forward to following your patient along with you.       Sincerely,    Jean Carlos Werner MD

## 2023-02-24 DIAGNOSIS — E53.8 VITAMIN B12 DEFICIENCY: ICD-10-CM

## 2023-02-24 RX ORDER — CYANOCOBALAMIN 1000 UG/ML
INJECTION, SOLUTION INTRAMUSCULAR; SUBCUTANEOUS
Qty: 10 ML | Refills: 11 | Status: SHIPPED | OUTPATIENT
Start: 2023-02-24

## 2023-05-08 ENCOUNTER — TELEPHONE (OUTPATIENT)
Age: 78
End: 2023-05-08

## 2023-05-08 NOTE — TELEPHONE ENCOUNTER
Spoke with patient and he reports right sciatica pain for last 2 weeks and is not improving. Neuropathy is now up to his bilateral knees. He reports that taking Tylenol but not effective. He is asking for an appt and scheduled with ELISABET Lema on 5/11/23 at 1100 AM. Grateful for the call.

## 2023-05-11 SDOH — ECONOMIC STABILITY: TRANSPORTATION INSECURITY
IN THE PAST 12 MONTHS, HAS LACK OF TRANSPORTATION KEPT YOU FROM MEETINGS, WORK, OR FROM GETTING THINGS NEEDED FOR DAILY LIVING?: NO

## 2023-05-11 SDOH — ECONOMIC STABILITY: FOOD INSECURITY: WITHIN THE PAST 12 MONTHS, THE FOOD YOU BOUGHT JUST DIDN'T LAST AND YOU DIDN'T HAVE MONEY TO GET MORE.: SOMETIMES TRUE

## 2023-05-11 SDOH — ECONOMIC STABILITY: FOOD INSECURITY: WITHIN THE PAST 12 MONTHS, YOU WORRIED THAT YOUR FOOD WOULD RUN OUT BEFORE YOU GOT MONEY TO BUY MORE.: SOMETIMES TRUE

## 2023-05-11 SDOH — ECONOMIC STABILITY: HOUSING INSECURITY
IN THE LAST 12 MONTHS, WAS THERE A TIME WHEN YOU DID NOT HAVE A STEADY PLACE TO SLEEP OR SLEPT IN A SHELTER (INCLUDING NOW)?: NO

## 2023-05-11 SDOH — ECONOMIC STABILITY: INCOME INSECURITY: HOW HARD IS IT FOR YOU TO PAY FOR THE VERY BASICS LIKE FOOD, HOUSING, MEDICAL CARE, AND HEATING?: NOT VERY HARD

## 2023-05-12 ENCOUNTER — HOSPITAL ENCOUNTER (OUTPATIENT)
Facility: HOSPITAL | Age: 78
End: 2023-05-12
Payer: MEDICARE

## 2023-05-12 ENCOUNTER — OFFICE VISIT (OUTPATIENT)
Age: 78
End: 2023-05-12
Payer: MEDICARE

## 2023-05-12 VITALS
OXYGEN SATURATION: 96 % | SYSTOLIC BLOOD PRESSURE: 135 MMHG | HEIGHT: 73 IN | WEIGHT: 178.4 LBS | TEMPERATURE: 97.8 F | HEART RATE: 63 BPM | DIASTOLIC BLOOD PRESSURE: 90 MMHG | BODY MASS INDEX: 23.64 KG/M2 | RESPIRATION RATE: 16 BRPM

## 2023-05-12 DIAGNOSIS — D69.6 THROMBOCYTOPENIA, UNSPECIFIED (HCC): ICD-10-CM

## 2023-05-12 DIAGNOSIS — E78.00 HYPERCHOLESTEROLEMIA: ICD-10-CM

## 2023-05-12 DIAGNOSIS — I48.19 PERSISTENT ATRIAL FIBRILLATION (HCC): ICD-10-CM

## 2023-05-12 DIAGNOSIS — N40.0 BENIGN PROSTATIC HYPERPLASIA WITHOUT LOWER URINARY TRACT SYMPTOMS: ICD-10-CM

## 2023-05-12 DIAGNOSIS — M54.6 ACUTE MIDLINE THORACIC BACK PAIN: Primary | ICD-10-CM

## 2023-05-12 DIAGNOSIS — I42.8 VALVULAR CARDIOMYOPATHY (HCC): ICD-10-CM

## 2023-05-12 DIAGNOSIS — G60.9 IDIOPATHIC PERIPHERAL NEUROPATHY: ICD-10-CM

## 2023-05-12 DIAGNOSIS — Z12.5 SCREENING FOR PROSTATE CANCER: ICD-10-CM

## 2023-05-12 DIAGNOSIS — M54.6 ACUTE MIDLINE THORACIC BACK PAIN: ICD-10-CM

## 2023-05-12 DIAGNOSIS — M54.10 RADICULAR SYNDROME OF LEFT LEG: ICD-10-CM

## 2023-05-12 DIAGNOSIS — M51.36 DDD (DEGENERATIVE DISC DISEASE), LUMBAR: ICD-10-CM

## 2023-05-12 DIAGNOSIS — E53.8 VITAMIN B12 DEFICIENCY: ICD-10-CM

## 2023-05-12 PROBLEM — M19.90 DEGENERATIVE ARTHRITIS: Status: ACTIVE | Noted: 2023-05-12

## 2023-05-12 PROCEDURE — 72100 X-RAY EXAM L-S SPINE 2/3 VWS: CPT

## 2023-05-12 PROCEDURE — 72072 X-RAY EXAM THORAC SPINE 3VWS: CPT

## 2023-05-12 PROCEDURE — G8420 CALC BMI NORM PARAMETERS: HCPCS | Performed by: INTERNAL MEDICINE

## 2023-05-12 PROCEDURE — G8427 DOCREV CUR MEDS BY ELIG CLIN: HCPCS | Performed by: INTERNAL MEDICINE

## 2023-05-12 PROCEDURE — 1036F TOBACCO NON-USER: CPT | Performed by: INTERNAL MEDICINE

## 2023-05-12 PROCEDURE — 99214 OFFICE O/P EST MOD 30 MIN: CPT | Performed by: INTERNAL MEDICINE

## 2023-05-12 PROCEDURE — 1123F ACP DISCUSS/DSCN MKR DOCD: CPT | Performed by: INTERNAL MEDICINE

## 2023-05-12 RX ORDER — METHOCARBAMOL 750 MG/1
750 TABLET, FILM COATED ORAL 4 TIMES DAILY
Qty: 40 TABLET | Refills: 0 | Status: SHIPPED | OUTPATIENT
Start: 2023-05-12 | End: 2023-05-22

## 2023-05-12 RX ORDER — ASCORBIC ACID 1000 MG
TABLET ORAL DAILY
COMMUNITY

## 2023-05-12 RX ORDER — PREDNISONE 20 MG/1
TABLET ORAL
Qty: 18 TABLET | Refills: 0 | Status: SHIPPED | OUTPATIENT
Start: 2023-05-12

## 2023-05-12 RX ORDER — APIXABAN 5 MG/1
5 TABLET, FILM COATED ORAL 2 TIMES DAILY
Qty: 60 TABLET | Refills: 5
Start: 2023-05-12

## 2023-05-12 RX ORDER — GABAPENTIN 600 MG/1
1 TABLET ORAL NIGHTLY
COMMUNITY
Start: 2023-01-22 | End: 2023-05-12

## 2023-05-12 RX ORDER — APIXABAN 5 MG/1
TABLET, FILM COATED ORAL
COMMUNITY
Start: 2023-04-11 | End: 2023-05-12

## 2023-05-12 RX ORDER — MELOXICAM 15 MG/1
15 TABLET ORAL DAILY
COMMUNITY
End: 2023-05-12 | Stop reason: ALTCHOICE

## 2023-05-12 RX ORDER — GABAPENTIN 600 MG/1
600 TABLET ORAL NIGHTLY
Qty: 90 TABLET | Refills: 1 | Status: SHIPPED | OUTPATIENT
Start: 2023-05-12 | End: 2023-08-10

## 2023-05-12 RX ORDER — MELOXICAM 15 MG/1
TABLET ORAL
Qty: 30 TABLET | Refills: 0 | Status: SHIPPED | OUTPATIENT
Start: 2023-05-12

## 2023-05-12 RX ORDER — TADALAFIL 20 MG/1
20 TABLET ORAL PRN
COMMUNITY
Start: 2019-04-25

## 2023-05-12 RX ORDER — ZINC 25 MG
TABLET ORAL DAILY
COMMUNITY

## 2023-05-12 RX ORDER — CYANOCOBALAMIN 1000 UG/ML
INJECTION, SOLUTION INTRAMUSCULAR; SUBCUTANEOUS
COMMUNITY
Start: 2023-02-24

## 2023-05-12 SDOH — SOCIAL STABILITY: SOCIAL INSECURITY: WITHIN THE LAST YEAR, HAVE YOU BEEN AFRAID OF YOUR PARTNER OR EX-PARTNER?: NO

## 2023-05-12 SDOH — ECONOMIC STABILITY: FOOD INSECURITY: WITHIN THE PAST 12 MONTHS, THE FOOD YOU BOUGHT JUST DIDN'T LAST AND YOU DIDN'T HAVE MONEY TO GET MORE.: NEVER TRUE

## 2023-05-12 SDOH — ECONOMIC STABILITY: HOUSING INSECURITY: IN THE LAST 12 MONTHS, HOW MANY PLACES HAVE YOU LIVED?: 1

## 2023-05-12 SDOH — HEALTH STABILITY: MENTAL HEALTH: HOW OFTEN DO YOU HAVE A DRINK CONTAINING ALCOHOL?: 2-4 TIMES A MONTH

## 2023-05-12 SDOH — HEALTH STABILITY: PHYSICAL HEALTH: ON AVERAGE, HOW MANY MINUTES DO YOU ENGAGE IN EXERCISE AT THIS LEVEL?: 30 MIN

## 2023-05-12 SDOH — ECONOMIC STABILITY: INCOME INSECURITY: IN THE LAST 12 MONTHS, WAS THERE A TIME WHEN YOU WERE NOT ABLE TO PAY THE MORTGAGE OR RENT ON TIME?: NO

## 2023-05-12 SDOH — HEALTH STABILITY: PHYSICAL HEALTH: ON AVERAGE, HOW MANY DAYS PER WEEK DO YOU ENGAGE IN MODERATE TO STRENUOUS EXERCISE (LIKE A BRISK WALK)?: 5 DAYS

## 2023-05-12 SDOH — SOCIAL STABILITY: SOCIAL INSECURITY: WITHIN THE LAST YEAR, HAVE YOU BEEN HUMILIATED OR EMOTIONALLY ABUSED IN OTHER WAYS BY YOUR PARTNER OR EX-PARTNER?: NO

## 2023-05-12 SDOH — SOCIAL STABILITY: SOCIAL NETWORK: ARE YOU MARRIED, WIDOWED, DIVORCED, SEPARATED, NEVER MARRIED, OR LIVING WITH A PARTNER?: MARRIED

## 2023-05-12 SDOH — SOCIAL STABILITY: SOCIAL NETWORK
IN A TYPICAL WEEK, HOW MANY TIMES DO YOU TALK ON THE PHONE WITH FAMILY, FRIENDS, OR NEIGHBORS?: MORE THAN THREE TIMES A WEEK

## 2023-05-12 SDOH — SOCIAL STABILITY: SOCIAL NETWORK
DO YOU BELONG TO ANY CLUBS OR ORGANIZATIONS SUCH AS CHURCH GROUPS UNIONS, FRATERNAL OR ATHLETIC GROUPS, OR SCHOOL GROUPS?: NO

## 2023-05-12 SDOH — ECONOMIC STABILITY: FOOD INSECURITY: WITHIN THE PAST 12 MONTHS, YOU WORRIED THAT YOUR FOOD WOULD RUN OUT BEFORE YOU GOT MONEY TO BUY MORE.: NEVER TRUE

## 2023-05-12 SDOH — SOCIAL STABILITY: SOCIAL INSECURITY
WITHIN THE LAST YEAR, HAVE YOU BEEN KICKED, HIT, SLAPPED, OR OTHERWISE PHYSICALLY HURT BY YOUR PARTNER OR EX-PARTNER?: NO

## 2023-05-12 SDOH — HEALTH STABILITY: MENTAL HEALTH
STRESS IS WHEN SOMEONE FEELS TENSE, NERVOUS, ANXIOUS, OR CAN'T SLEEP AT NIGHT BECAUSE THEIR MIND IS TROUBLED. HOW STRESSED ARE YOU?: NOT AT ALL

## 2023-05-12 SDOH — SOCIAL STABILITY: SOCIAL INSECURITY
WITHIN THE LAST YEAR, HAVE TO BEEN RAPED OR FORCED TO HAVE ANY KIND OF SEXUAL ACTIVITY BY YOUR PARTNER OR EX-PARTNER?: NO

## 2023-05-12 SDOH — SOCIAL STABILITY: SOCIAL NETWORK: HOW OFTEN DO YOU GET TOGETHER WITH FRIENDS OR RELATIVES?: MORE THAN THREE TIMES A WEEK

## 2023-05-12 SDOH — HEALTH STABILITY: MENTAL HEALTH: HOW MANY STANDARD DRINKS CONTAINING ALCOHOL DO YOU HAVE ON A TYPICAL DAY?: 1 OR 2

## 2023-05-12 SDOH — ECONOMIC STABILITY: TRANSPORTATION INSECURITY
IN THE PAST 12 MONTHS, HAS THE LACK OF TRANSPORTATION KEPT YOU FROM MEDICAL APPOINTMENTS OR FROM GETTING MEDICATIONS?: NO

## 2023-05-12 SDOH — SOCIAL STABILITY: SOCIAL NETWORK: HOW OFTEN DO YOU ATTEND CHURCH OR RELIGIOUS SERVICES?: NEVER

## 2023-05-12 SDOH — SOCIAL STABILITY: SOCIAL NETWORK: HOW OFTEN DO YOU ATTENT MEETINGS OF THE CLUB OR ORGANIZATION YOU BELONG TO?: NEVER

## 2023-05-12 SDOH — ECONOMIC STABILITY: INCOME INSECURITY: HOW HARD IS IT FOR YOU TO PAY FOR THE VERY BASICS LIKE FOOD, HOUSING, MEDICAL CARE, AND HEATING?: NOT HARD AT ALL

## 2023-05-12 ASSESSMENT — PATIENT HEALTH QUESTIONNAIRE - PHQ9
SUM OF ALL RESPONSES TO PHQ QUESTIONS 1-9: 0
1. LITTLE INTEREST OR PLEASURE IN DOING THINGS: 0
SUM OF ALL RESPONSES TO PHQ QUESTIONS 1-9: 0
SUM OF ALL RESPONSES TO PHQ9 QUESTIONS 1 & 2: 0
SUM OF ALL RESPONSES TO PHQ QUESTIONS 1-9: 0
SUM OF ALL RESPONSES TO PHQ QUESTIONS 1-9: 0
2. FEELING DOWN, DEPRESSED OR HOPELESS: 0

## 2023-05-15 PROBLEM — D69.6 THROMBOCYTOPENIA, UNSPECIFIED (HCC): Status: ACTIVE | Noted: 2023-05-15

## 2023-05-23 DIAGNOSIS — M54.6 ACUTE MIDLINE THORACIC BACK PAIN: ICD-10-CM

## 2023-05-23 DIAGNOSIS — M54.10 RADICULAR SYNDROME OF LEFT LEG: ICD-10-CM

## 2023-05-23 RX ORDER — PREDNISONE 20 MG/1
TABLET ORAL
Qty: 18 TABLET | Refills: 0 | Status: SHIPPED | OUTPATIENT
Start: 2023-05-23

## 2023-05-23 RX ORDER — CYANOCOBALAMIN 1000 UG/ML
INJECTION, SOLUTION INTRAMUSCULAR; SUBCUTANEOUS
COMMUNITY
Start: 2023-02-24 | End: 2023-07-03

## 2023-05-30 ENCOUNTER — HOSPITAL ENCOUNTER (OUTPATIENT)
Facility: HOSPITAL | Age: 78
Setting detail: RECURRING SERIES
Discharge: HOME | End: 2023-06-02
Payer: MEDICARE

## 2023-05-30 PROCEDURE — 97162 PT EVAL MOD COMPLEX 30 MIN: CPT

## 2023-05-30 PROCEDURE — 97110 THERAPEUTIC EXERCISES: CPT

## 2023-05-30 NOTE — THERAPY EVALUATION
Physical Therapy at [facility],   a part of  [place] [facility]  [address] [facility] [address]  Phone: [phone]  Fax: [phone]       PHYSICAL THERAPY - MEDICARE EVALUATION/PLAN OF CARE NOTE (updated 3/23)      Date: 2023          Patient Name:  Megan Mac :  1945   Medical   Diagnosis:  Acute midline thoracic back pain [M54.6]  Radicular syndrome of left leg [M54.10] Treatment Diagnosis:  M79.605  Pain in left leg  and M54.42  LUMBAGO WITH SCIATICA, LEFT SIDE    Referral Source:  Estela Mandujano MD Provider #:  [id number]                Insurance: Payor: MEDICARE / Plan: MEDICARE PART A AND B / Product Type: *No Product type* /      Patient  verified yes     Visit #   Current  / Total 1 NA   Time   In / Out 3:15 p 4:15 p   Total Treatment Time 60   Total Timed Codes 14   1:1 Treatment Time 60    [facility] Totals Reminder:  bill using total billable   min of TIMED therapeutic procedures and modalities. 8-22 min = 1 unit; 23-37 min = 2 units; 38-52 min = 3 units;  53-67 min = 4 units; 68-82 min = 5 units           SUBJECTIVE  Pain Level (0-10 scale): Current- 2, Best- 2, Worst- 8    []constant []intermittent []improving []worsening []no change since onset    Any medication changes, allergies to medications, adverse drug reactions, diagnosis change, or new procedure performed?: [x] No    [] Yes (see summary sheet for update)  Medications: Verified on Patient Summary List    Subjective functional status/changes:       Start of Care: 2023  Onset Date: 5/3/2023     Current symptoms/Complaints: LBP and L sciatic pain. Notes that he has had back pain off and on chronically. Had a lumbar fusion in  due to sciatic issues and he reports this resolved his pain. This time pain began insidiously and he went to his PCP who prescribed Prednisone which resolved his pain.   He then \"overdid it\" with a heavy backpack during yard work and pain

## 2023-06-01 ENCOUNTER — HOSPITAL ENCOUNTER (OUTPATIENT)
Facility: HOSPITAL | Age: 78
Setting detail: RECURRING SERIES
Discharge: HOME | End: 2023-06-04
Payer: MEDICARE

## 2023-06-01 PROCEDURE — 97110 THERAPEUTIC EXERCISES: CPT

## 2023-06-01 PROCEDURE — 97140 MANUAL THERAPY 1/> REGIONS: CPT

## 2023-06-06 ENCOUNTER — HOSPITAL ENCOUNTER (OUTPATIENT)
Facility: HOSPITAL | Age: 78
Setting detail: RECURRING SERIES
Discharge: HOME | End: 2023-06-09
Payer: MEDICARE

## 2023-06-06 PROCEDURE — 97110 THERAPEUTIC EXERCISES: CPT

## 2023-06-06 NOTE — PROGRESS NOTES
PHYSICAL THERAPY - MEDICARE DAILY TREATMENT NOTE (updated 3/23)      Date: 2023          Patient Name:  Lisa Hendricks :  1945   Medical   Diagnosis:  Acute midline thoracic back pain [M54.6]  Radicular syndrome of left leg [M54.10] Treatment Diagnosis:  M54.42  LUMBAGO WITH SCIATICA, LEFT SIDE    Referral Source:  Elizabeth Thomas MD Insurance:   Payor: MEDICARE / Plan: MEDICARE PART A AND B / Product Type: *No Product type* /                     Patient  verified yes     Visit #   Current  / Total 3 NA   Time   In / Out 10:55 11:50 a   Total Treatment Time 55   Total Timed Codes 55   1:1 Treatment Time 54      [facility] Totals Reminder:  bill using total billable   min of TIMED therapeutic procedures and modalities. 8-22 min = 1 unit; 23-37 min = 2 units; 38-52 min = 3 units; 53-67 min = 4 units; 68-82 min = 5 units            SUBJECTIVE    Pain Level (0-10 scale): 2    Any medication changes, allergies to medications, adverse drug reactions, diagnosis change, or new procedure performed?: [x] No    [] Yes (see summary sheet for update)  Medications: Verified on Patient Summary List    Subjective functional status/changes:     Patient reports he woke up with 4/10 pain down the LLE tot he knee. Since moving around the pain has gone down. A little sore after last visit. OBJECTIVE      Therapeutic Procedures: Tx Min Billable or 1:1 Min (if diff from Tx Min) Procedure, Rationale, Specifics   55  53489 Therapeutic Exercise (timed):  increase ROM, strength, coordination, balance, and proprioception to improve patient's ability to progress to PLOF and address remaining functional goals. (see flow sheet as applicable)     Details if applicable:       85598 Manual Therapy (timed):  decrease pain, increase ROM, and increase tissue extensibility to improve patient's ability to progress to PLOF and address remaining functional goals.   The manual therapy interventions were performed at a separate and distinct

## 2023-06-14 LAB
ALBUMIN SERPL-MCNC: 4.3 G/DL (ref 3.5–5)
ALBUMIN/GLOB SERPL: 1.4 (ref 1.1–2.2)
ALP SERPL-CCNC: 94 U/L (ref 45–117)
ALT SERPL-CCNC: 33 U/L (ref 12–78)
ANION GAP SERPL CALC-SCNC: 0 MMOL/L (ref 5–15)
AST SERPL-CCNC: 38 U/L (ref 15–37)
BASOPHILS # BLD: 0.1 K/UL (ref 0–0.1)
BASOPHILS NFR BLD: 1 % (ref 0–1)
BILIRUB SERPL-MCNC: 1.5 MG/DL (ref 0.2–1)
BUN SERPL-MCNC: 17 MG/DL (ref 6–20)
BUN/CREAT SERPL: 20 (ref 12–20)
CALCIUM SERPL-MCNC: 9.1 MG/DL (ref 8.5–10.1)
CHLORIDE SERPL-SCNC: 106 MMOL/L (ref 97–108)
CHOLEST SERPL-MCNC: 166 MG/DL
CO2 SERPL-SCNC: 30 MMOL/L (ref 21–32)
CREAT SERPL-MCNC: 0.87 MG/DL (ref 0.7–1.3)
DIFFERENTIAL METHOD BLD: ABNORMAL
EOSINOPHIL # BLD: 0 K/UL (ref 0–0.4)
EOSINOPHIL NFR BLD: 0 % (ref 0–7)
ERYTHROCYTE [DISTWIDTH] IN BLOOD BY AUTOMATED COUNT: 13.2 % (ref 11.5–14.5)
ERYTHROCYTE [SEDIMENTATION RATE] IN BLOOD: 7 MM/HR (ref 0–20)
GLOBULIN SER CALC-MCNC: 3.1 G/DL (ref 2–4)
GLUCOSE SERPL-MCNC: 106 MG/DL (ref 65–100)
HCT VFR BLD AUTO: 42.8 % (ref 36.6–50.3)
HDLC SERPL-MCNC: 67 MG/DL
HDLC SERPL: 2.5 (ref 0–5)
HGB BLD-MCNC: 13.4 G/DL (ref 12.1–17)
IMM GRANULOCYTES # BLD AUTO: 0 K/UL (ref 0–0.04)
IMM GRANULOCYTES NFR BLD AUTO: 0 % (ref 0–0.5)
LDLC SERPL CALC-MCNC: 90.2 MG/DL (ref 0–100)
LYMPHOCYTES # BLD: 0.7 K/UL (ref 0.8–3.5)
LYMPHOCYTES NFR BLD: 12 % (ref 12–49)
MCH RBC QN AUTO: 32.4 PG (ref 26–34)
MCHC RBC AUTO-ENTMCNC: 31.3 G/DL (ref 30–36.5)
MCV RBC AUTO: 103.4 FL (ref 80–99)
MONOCYTES # BLD: 0.4 K/UL (ref 0–1)
MONOCYTES NFR BLD: 8 % (ref 5–13)
NEUTS SEG # BLD: 4.3 K/UL (ref 1.8–8)
NEUTS SEG NFR BLD: 79 % (ref 32–75)
NRBC # BLD: 0 K/UL (ref 0–0.01)
NRBC BLD-RTO: 0 PER 100 WBC
PLATELET # BLD AUTO: 133 K/UL (ref 150–400)
PMV BLD AUTO: 12.6 FL (ref 8.9–12.9)
POTASSIUM SERPL-SCNC: 4.4 MMOL/L (ref 3.5–5.1)
PROT SERPL-MCNC: 7.4 G/DL (ref 6.4–8.2)
PSA SERPL-MCNC: 1.6 NG/ML (ref 0.01–4)
RBC # BLD AUTO: 4.14 M/UL (ref 4.1–5.7)
RBC MORPH BLD: ABNORMAL
SODIUM SERPL-SCNC: 136 MMOL/L (ref 136–145)
TRIGL SERPL-MCNC: 44 MG/DL
VLDLC SERPL CALC-MCNC: 8.8 MG/DL
WBC # BLD AUTO: 5.5 K/UL (ref 4.1–11.1)

## 2023-06-15 ENCOUNTER — HOSPITAL ENCOUNTER (OUTPATIENT)
Facility: HOSPITAL | Age: 78
Setting detail: RECURRING SERIES
Discharge: HOME | End: 2023-06-18
Payer: MEDICARE

## 2023-06-15 PROCEDURE — 97112 NEUROMUSCULAR REEDUCATION: CPT

## 2023-06-15 PROCEDURE — 97110 THERAPEUTIC EXERCISES: CPT

## 2023-06-20 ENCOUNTER — HOSPITAL ENCOUNTER (OUTPATIENT)
Facility: HOSPITAL | Age: 78
Setting detail: RECURRING SERIES
Discharge: HOME | End: 2023-06-23
Payer: MEDICARE

## 2023-06-20 PROCEDURE — 97110 THERAPEUTIC EXERCISES: CPT

## 2023-06-20 PROCEDURE — 97112 NEUROMUSCULAR REEDUCATION: CPT

## 2023-06-20 PROCEDURE — 97140 MANUAL THERAPY 1/> REGIONS: CPT

## 2023-06-20 NOTE — PROGRESS NOTES
PHYSICAL THERAPY - MEDICARE DAILY TREATMENT NOTE (updated 3/23)      Date: 2023          Patient Name:  Vin Small :  1945   Medical   Diagnosis:  Acute midline thoracic back pain [M54.6]  Radicular syndrome of left leg [M54.10] Treatment Diagnosis:  M54.42  LUMBAGO WITH SCIATICA, LEFT SIDE    Referral Source:  Manuel Suarez MD Insurance:   Payor: MEDICARE / Plan: MEDICARE PART A AND B / Product Type: *No Product type* /                     Patient  verified yes     Visit #   Current  / Total 7 NA   Time   In / Out 10:46 a 11:40 a   Total Treatment Time 54   Total Timed Codes 44   1:1 Treatment Time 40      [facility] Totals Reminder:  bill using total billable   min of TIMED therapeutic procedures and modalities. 8-22 min = 1 unit; 23-37 min = 2 units; 38-52 min = 3 units; 53-67 min = 4 units; 68-82 min = 5 units            SUBJECTIVE    Pain Level (0-10 scale): 1.5    Any medication changes, allergies to medications, adverse drug reactions, diagnosis change, or new procedure performed?: [x] No    [] Yes (see summary sheet for update)  Medications: Verified on Patient Summary List    Subjective functional status/changes:     Patient reports that he still wakes up with 4/10 pain despite his new mattress. Sleeping on the L is the most comfortable, the R is the worst.     OBJECTIVE      Therapeutic Procedures: Tx Min Billable or 1:1 Min (if diff from Tx Min) Procedure, Rationale, Specifics   28  46176 Therapeutic Exercise (timed):  increase ROM, strength, coordination, balance, and proprioception to improve patient's ability to progress to PLOF and address remaining functional goals.  (see flow sheet as applicable)     Details if applicable:     8              8    48427 Neuromuscular Re-Education (timed):  improve balance, coordination, kinesthetic sense, posture, core stability and proprioception to improve patient's ability to develop conscious control of individual muscles and awareness of

## 2023-06-22 ENCOUNTER — HOSPITAL ENCOUNTER (OUTPATIENT)
Facility: HOSPITAL | Age: 78
Setting detail: RECURRING SERIES
Discharge: HOME | End: 2023-06-25
Payer: MEDICARE

## 2023-06-22 PROCEDURE — 97140 MANUAL THERAPY 1/> REGIONS: CPT

## 2023-06-22 PROCEDURE — 97112 NEUROMUSCULAR REEDUCATION: CPT

## 2023-06-22 PROCEDURE — 97110 THERAPEUTIC EXERCISES: CPT

## 2023-06-27 ENCOUNTER — APPOINTMENT (OUTPATIENT)
Facility: HOSPITAL | Age: 78
End: 2023-06-27
Payer: MEDICARE

## 2023-06-29 ENCOUNTER — APPOINTMENT (OUTPATIENT)
Facility: HOSPITAL | Age: 78
End: 2023-06-29
Payer: MEDICARE

## 2023-06-30 SDOH — HEALTH STABILITY: PHYSICAL HEALTH: ON AVERAGE, HOW MANY DAYS PER WEEK DO YOU ENGAGE IN MODERATE TO STRENUOUS EXERCISE (LIKE A BRISK WALK)?: 5 DAYS

## 2023-06-30 SDOH — HEALTH STABILITY: PHYSICAL HEALTH: ON AVERAGE, HOW MANY MINUTES DO YOU ENGAGE IN EXERCISE AT THIS LEVEL?: 60 MIN

## 2023-06-30 ASSESSMENT — LIFESTYLE VARIABLES
HAVE YOU OR SOMEONE ELSE BEEN INJURED AS A RESULT OF YOUR DRINKING: 0
HAS A RELATIVE, FRIEND, DOCTOR, OR ANOTHER HEALTH PROFESSIONAL EXPRESSED CONCERN ABOUT YOUR DRINKING OR SUGGESTED YOU CUT DOWN: NO
HOW OFTEN DURING THE LAST YEAR HAVE YOU FOUND THAT YOU WERE NOT ABLE TO STOP DRINKING ONCE YOU HAD STARTED: 0
HOW OFTEN DURING THE LAST YEAR HAVE YOU BEEN UNABLE TO REMEMBER WHAT HAPPENED THE NIGHT BEFORE BECAUSE YOU HAD BEEN DRINKING: NEVER
HOW MANY STANDARD DRINKS CONTAINING ALCOHOL DO YOU HAVE ON A TYPICAL DAY: 1 OR 2
HOW OFTEN DURING THE LAST YEAR HAVE YOU NEEDED AN ALCOHOLIC DRINK FIRST THING IN THE MORNING TO GET YOURSELF GOING AFTER A NIGHT OF HEAVY DRINKING: 0
HOW MANY STANDARD DRINKS CONTAINING ALCOHOL DO YOU HAVE ON A TYPICAL DAY: 1
HAVE YOU OR SOMEONE ELSE BEEN INJURED AS A RESULT OF YOUR DRINKING: NO
HOW OFTEN DURING THE LAST YEAR HAVE YOU HAD A FEELING OF GUILT OR REMORSE AFTER DRINKING: 0
HOW OFTEN DO YOU HAVE A DRINK CONTAINING ALCOHOL: 5
HOW OFTEN DURING THE LAST YEAR HAVE YOU BEEN UNABLE TO REMEMBER WHAT HAPPENED THE NIGHT BEFORE BECAUSE YOU HAD BEEN DRINKING: 0
HOW OFTEN DURING THE LAST YEAR HAVE YOU FAILED TO DO WHAT WAS NORMALLY EXPECTED FROM YOU BECAUSE OF DRINKING: NEVER
HOW OFTEN DO YOU HAVE A DRINK CONTAINING ALCOHOL: 4 OR MORE TIMES A WEEK
HOW OFTEN DURING THE LAST YEAR HAVE YOU FAILED TO DO WHAT WAS NORMALLY EXPECTED FROM YOU BECAUSE OF DRINKING: 0
HOW OFTEN DURING THE LAST YEAR HAVE YOU FOUND THAT YOU WERE NOT ABLE TO STOP DRINKING ONCE YOU HAD STARTED: NEVER
HOW OFTEN DURING THE LAST YEAR HAVE YOU NEEDED AN ALCOHOLIC DRINK FIRST THING IN THE MORNING TO GET YOURSELF GOING AFTER A NIGHT OF HEAVY DRINKING: NEVER
HAS A RELATIVE, FRIEND, DOCTOR, OR ANOTHER HEALTH PROFESSIONAL EXPRESSED CONCERN ABOUT YOUR DRINKING OR SUGGESTED YOU CUT DOWN: 0
HOW OFTEN DURING THE LAST YEAR HAVE YOU HAD A FEELING OF GUILT OR REMORSE AFTER DRINKING: NEVER
HOW OFTEN DO YOU HAVE SIX OR MORE DRINKS ON ONE OCCASION: 1

## 2023-06-30 ASSESSMENT — PATIENT HEALTH QUESTIONNAIRE - PHQ9
SUM OF ALL RESPONSES TO PHQ9 QUESTIONS 1 & 2: 0
SUM OF ALL RESPONSES TO PHQ QUESTIONS 1-9: 0
2. FEELING DOWN, DEPRESSED OR HOPELESS: 0
1. LITTLE INTEREST OR PLEASURE IN DOING THINGS: 0
SUM OF ALL RESPONSES TO PHQ QUESTIONS 1-9: 0

## 2023-07-03 ENCOUNTER — OFFICE VISIT (OUTPATIENT)
Age: 78
End: 2023-07-03
Payer: MEDICARE

## 2023-07-03 ENCOUNTER — HOSPITAL ENCOUNTER (OUTPATIENT)
Facility: HOSPITAL | Age: 78
Setting detail: RECURRING SERIES
Discharge: HOME | End: 2023-07-06
Payer: MEDICARE

## 2023-07-03 VITALS
OXYGEN SATURATION: 97 % | RESPIRATION RATE: 18 BRPM | DIASTOLIC BLOOD PRESSURE: 83 MMHG | BODY MASS INDEX: 23.56 KG/M2 | SYSTOLIC BLOOD PRESSURE: 129 MMHG | WEIGHT: 177.8 LBS | HEIGHT: 73 IN | TEMPERATURE: 98.5 F | HEART RATE: 71 BPM

## 2023-07-03 DIAGNOSIS — D75.89 MACROCYTOSIS: ICD-10-CM

## 2023-07-03 DIAGNOSIS — M51.36 DDD (DEGENERATIVE DISC DISEASE), LUMBAR: ICD-10-CM

## 2023-07-03 DIAGNOSIS — E53.8 VITAMIN B12 DEFICIENCY: ICD-10-CM

## 2023-07-03 DIAGNOSIS — M43.10 RETROLISTHESIS OF VERTEBRAE: ICD-10-CM

## 2023-07-03 DIAGNOSIS — M54.10 RADICULAR SYNDROME OF LEFT LEG: ICD-10-CM

## 2023-07-03 DIAGNOSIS — D69.6 THROMBOCYTOPENIA (HCC): ICD-10-CM

## 2023-07-03 DIAGNOSIS — Z00.00 MEDICARE ANNUAL WELLNESS VISIT, SUBSEQUENT: Primary | ICD-10-CM

## 2023-07-03 PROCEDURE — 1123F ACP DISCUSS/DSCN MKR DOCD: CPT | Performed by: INTERNAL MEDICINE

## 2023-07-03 PROCEDURE — 97112 NEUROMUSCULAR REEDUCATION: CPT

## 2023-07-03 PROCEDURE — 97110 THERAPEUTIC EXERCISES: CPT

## 2023-07-03 PROCEDURE — 99213 OFFICE O/P EST LOW 20 MIN: CPT | Performed by: INTERNAL MEDICINE

## 2023-07-03 PROCEDURE — G8427 DOCREV CUR MEDS BY ELIG CLIN: HCPCS | Performed by: INTERNAL MEDICINE

## 2023-07-03 PROCEDURE — G0439 PPPS, SUBSEQ VISIT: HCPCS | Performed by: INTERNAL MEDICINE

## 2023-07-03 PROCEDURE — G8420 CALC BMI NORM PARAMETERS: HCPCS | Performed by: INTERNAL MEDICINE

## 2023-07-03 PROCEDURE — 1036F TOBACCO NON-USER: CPT | Performed by: INTERNAL MEDICINE

## 2023-07-03 NOTE — PROGRESS NOTES
Physical Therapy at [facility],   a part of [address] [place] [address] [address]  Phone: [phone]  Fax: [phone]  PHYSICAL THERAPY PROGRESS NOTE  Patient Name:  Diana Simmons :  1945   Treatment/Medical Diagnosis: Acute midline thoracic back pain [M54.6]  Radicular syndrome of left leg [M54.10]   Referral Source:  Vlad Dhillon MD     Date of Initial Visit:  23 Attended Visits:  9 Missed Visits:  0     SUMMARY OF TREATMENT/ASSESSMENT:  At time of reassessment, patient has attended 9 PT visits and has achieved all short term goals and 1/3 long term goals. He reports significant relief from stretching and positioning instruction but continues to wake with 4/10 pain. He demonstrates improved pain free lumbar AROM, LE strength unchanged. He has improved his tandem stance and single limb stance times to reduce fall risk with functional mobility tasks. He will continue to benefit from skilled PT services 1x/week for 4-6 weeks to achieve outstanding goals. CURRENT STATUS    Short Term Goals: To be accomplished in 4 weeks  Patient will be independent with initial HEP in order to transition to general wellness program. MET  Patient will report worst pain level of 6/10 or better to increased QOL and tolerance for sleeping through the night. MET  The patient will demonstrate tandem stance for 30 seconds to reduce fall risk when ambulating on the grass in his yard while performing yard work. MET     Long Term Goals: To be accomplished in 12 weeks  1. Patient will report worst pain no greater than 2/10 to increase QOL and allow for independence with all hygienic self-care and ADL skills Progressing toward  2. Patient will demonstrate 5/5 BLE strength to allow for home cleaning skills independently and without rest breaks needed No change  3. Patient will demonstrate pain free lumbar AROM WFL to improve ease with household chores like

## 2023-07-03 NOTE — PROGRESS NOTES
PHYSICAL THERAPY - MEDICARE DAILY TREATMENT NOTE (updated 3/23)      Date: 7/3/2023          Patient Name:  Shiv Rust :  1945   Medical   Diagnosis:  Acute midline thoracic back pain [M54.6]  Radicular syndrome of left leg [M54.10] Treatment Diagnosis:  M54.42  LUMBAGO WITH SCIATICA, LEFT SIDE    Referral Source:  Luis Enrique [facility]inas MD Insurance:   Payor: MEDICARE / Plan: MEDICARE PART A AND B / Product Type: *No Product type* /                     Patient  verified yes     Visit #   Current  / Total 9 NA   Time   In / Out 3:15 p 4:10 p   Total Treatment Time 55   Total Timed Codes 45   1:1 Treatment Time 39      [facility] Totals Reminder:  bill using total billable   min of TIMED therapeutic procedures and modalities. 8-22 min = 1 unit; 23-37 min = 2 units; 38-52 min = 3 units; 53-67 min = 4 units; 68-82 min = 5 units            SUBJECTIVE    Pain Level (0-10 scale): 2    Any medication changes, allergies to medications, adverse drug reactions, diagnosis change, or new procedure performed?: [x] No    [] Yes (see summary sheet for update)  Medications: Verified on Patient Summary List    Subjective functional status/changes:     Patient reports that he saw Dr. Yecenia Fairbanks and reviewed x-rays which revealed reduced cartilage in his back. Sarah Nielsen He will be seeing an orthopedist about this soon. OBJECTIVE      Therapeutic Procedures: Tx Min Billable or 1:1 Min (if diff from Tx Min) Procedure, Rationale, Specifics   [id number] Therapeutic Exercise (timed):  increase ROM, strength, coordination, balance, and proprioception to improve patient's ability to progress to PLOF and address remaining functional goals.  (see flow sheet as applicable)     Details if applicable:     10              0    44167 Neuromuscular Re-Education (timed):  improve balance, coordination, kinesthetic sense, posture, core stability and proprioception to improve patient's ability to develop conscious control of individual muscles and awareness of

## 2023-07-03 NOTE — PROGRESS NOTES
Medicare Annual Wellness Visit    Janae Pinedo is here for Medicare AWV (Discuss sciatica and MRI. ) and Lab Collection (Nonfasting. )    Assessment & Plan   Medicare annual wellness visit, subsequent  Recommend COVID-19 booster this fall. Radicular syndrome of left leg  DDD (degenerative disc disease), lumbar  Retrolisthesis of vertebrae  Pain levels are somewhat improved after steroids and rest with stretching, but still has to cope with chronic pain from this. Advised that interventions may help reduce pain, but may not completely resolved. He understands. Will recommend consultation. With his 6 mm retrolisthesis, perhaps spinal fusion needs to be considered? Certainly could consider EMG. For now, recommend ongoing gentle stretches, no severely heavy lifting. Continue physical therapy. Consider MRI.  -     [facility] - Rik Gurrola MD, Orthopedic Surgery (back, neck, spine), [place]    Macrocytosis  Unclear etiology. He is doing B12 injections monthly. Recommend checking labs just prior to his next B12 injection to make sure that his levels are remaining high. Low platelets as well. Consider further evaluation and hematology consultation. Check manual differential.    -     CBC with Manual Differential; Future  -     Vitamin B12 & Folate; Future  Vitamin B12 deficiency  -     Vitamin B12 & Folate; Future  Thrombocytopenia (HCC)  -     CBC with Manual Differential; Future    Recommendations for Preventive Services Due: see orders and patient instructions/AVS.  Recommended screening schedule for the next 5-10 years is provided to the patient in written form: see Patient Instructions/AVS.     Follow-up every 6 months routinely. Sooner if needed. Subjective   The following acute and/or chronic problems were also addressed today:     to [place]. They have 3 daughters. Labs 5/12/23  Showed macrocytosis which was present May 2022. WBC 5.5, platelets mildly low 133.   Neutrophils

## 2023-07-05 ENCOUNTER — TELEPHONE (OUTPATIENT)
Age: 78
End: 2023-07-05

## 2023-07-05 NOTE — TELEPHONE ENCOUNTER
Spoke with patient and advised that the results of his recent xray's have been emailed to him and he confirms receipt. Grateful for the call.

## 2023-07-05 NOTE — TELEPHONE ENCOUNTER
Patient requesting copy of xray that was taken of is back to show issue with back. Send via email: [email or web address]

## 2023-07-06 ENCOUNTER — APPOINTMENT (OUTPATIENT)
Facility: HOSPITAL | Age: 78
End: 2023-07-06
Payer: MEDICARE

## 2023-07-17 ENCOUNTER — HOSPITAL ENCOUNTER (OUTPATIENT)
Facility: HOSPITAL | Age: 78
Setting detail: RECURRING SERIES
Discharge: HOME | End: 2023-07-20
Payer: MEDICARE

## 2023-07-17 PROCEDURE — 97110 THERAPEUTIC EXERCISES: CPT

## 2023-07-17 PROCEDURE — 97112 NEUROMUSCULAR REEDUCATION: CPT

## 2023-07-17 NOTE — PROGRESS NOTES
PHYSICAL THERAPY - MEDICARE DAILY TREATMENT NOTE (updated 3/23)      Date: 2023          Patient Name:  Pk Fitch :  1945   Medical   Diagnosis:  Acute midline thoracic back pain [M54.6]  Radicular syndrome of left leg [M54.10] Treatment Diagnosis:  M54.42  LUMBAGO WITH SCIATICA, LEFT SIDE    Referral Source:  Eleonora Kma MD Insurance:   Payor: MEDICARE / Plan: MEDICARE PART A AND B / Product Type: *No Product type* /                     Patient  verified yes     Visit #   Current  / Total 10 NA   Time   In / Out 3:45 p 4:40p   Total Treatment Time 55   Total Timed Codes 45   1:1 Treatment Time 39      [facility] Totals Reminder:  bill using total billable   min of TIMED therapeutic procedures and modalities. 8-22 min = 1 unit; 23-37 min = 2 units; 38-52 min = 3 units; 53-67 min = 4 units; 68-82 min = 5 units            SUBJECTIVE    Pain Level (0-10 scale): 2    Any medication changes, allergies to medications, adverse drug reactions, diagnosis change, or new procedure performed?: [x] No    [] Yes (see summary sheet for update)  Medications: Verified on Patient Summary List    Subjective functional status/changes:     Patient reports that he will be getting an MRI on Thursday and then Dr. Cherise Caro will decide on whether to perform an injection or possible surgery depending on what it looks like. Patient will reported what is done on next visit. OBJECTIVE      Therapeutic Procedures: Tx Min Billable or 1:1 Min (if diff from Tx Min) Procedure, Rationale, Specifics   35  02558 Therapeutic Exercise (timed):  increase ROM, strength, coordination, balance, and proprioception to improve patient's ability to progress to PLOF and address remaining functional goals.  (see flow sheet as applicable)     Details if applicable:     10    04367 Neuromuscular Re-Education (timed):  improve balance, coordination, kinesthetic sense, posture, core stability and proprioception to improve patient's ability to

## 2023-07-20 ENCOUNTER — HOSPITAL ENCOUNTER (OUTPATIENT)
Facility: HOSPITAL | Age: 78
Discharge: HOME | End: 2023-07-20
Attending: ORTHOPAEDIC SURGERY
Payer: MEDICARE

## 2023-07-20 DIAGNOSIS — M54.50 LOW BACK PAIN, UNSPECIFIED BACK PAIN LATERALITY, UNSPECIFIED CHRONICITY, UNSPECIFIED WHETHER SCIATICA PRESENT: ICD-10-CM

## 2023-07-20 DIAGNOSIS — D75.89 MACROCYTOSIS: ICD-10-CM

## 2023-07-20 DIAGNOSIS — E53.8 VITAMIN B12 DEFICIENCY: ICD-10-CM

## 2023-07-20 DIAGNOSIS — D69.6 THROMBOCYTOPENIA (HCC): ICD-10-CM

## 2023-07-20 PROCEDURE — 72148 MRI LUMBAR SPINE W/O DYE: CPT

## 2023-07-21 ENCOUNTER — TELEPHONE (OUTPATIENT)
Age: 78
End: 2023-07-21

## 2023-07-21 LAB
BASOPHILS # BLD: 0 K/UL (ref 0–0.1)
BASOPHILS NFR BLD: 0 % (ref 0–1)
BLASTS NFR BLD MANUAL: 0 %
DIFFERENTIAL METHOD BLD: ABNORMAL
EOSINOPHIL # BLD: 0.1 K/UL (ref 0–0.4)
EOSINOPHIL NFR BLD: 2 % (ref 0–7)
ERYTHROCYTE [DISTWIDTH] IN BLOOD BY AUTOMATED COUNT: 13.3 % (ref 11.5–14.5)
FOLATE SERPL-MCNC: 17.1 NG/ML (ref 5–21)
HCT VFR BLD AUTO: 40.7 % (ref 36.6–50.3)
HGB BLD-MCNC: 12.9 G/DL (ref 12.1–17)
IMM GRANULOCYTES # BLD AUTO: 0 K/UL
IMM GRANULOCYTES NFR BLD AUTO: 0 %
LYMPHOCYTES # BLD: 1.3 K/UL (ref 0.8–3.5)
LYMPHOCYTES NFR BLD: 26 % (ref 12–49)
MCH RBC QN AUTO: 32.7 PG (ref 26–34)
MCHC RBC AUTO-ENTMCNC: 31.7 G/DL (ref 30–36.5)
MCV RBC AUTO: 103.3 FL (ref 80–99)
METAMYELOCYTES NFR BLD MANUAL: 0 %
MONOCYTES # BLD: 0.4 K/UL (ref 0–1)
MONOCYTES NFR BLD: 7 % (ref 5–13)
MYELOCYTES NFR BLD MANUAL: 0 %
NEUTS BAND NFR BLD MANUAL: 0 % (ref 0–6)
NEUTS SEG # BLD: 3.3 K/UL (ref 1.8–8)
NEUTS SEG NFR BLD: 65 % (ref 32–75)
NRBC # BLD: 0 K/UL (ref 0–0.01)
NRBC BLD-RTO: 0 PER 100 WBC
OTHER CELLS NFR BLD MANUAL: 0
PLATELET # BLD AUTO: 148 K/UL (ref 150–400)
PMV BLD AUTO: 12.1 FL (ref 8.9–12.9)
PROMYELOCYTES NFR BLD MANUAL: 0 %
RBC # BLD AUTO: 3.94 M/UL (ref 4.1–5.7)
RBC MORPH BLD: ABNORMAL
VIT B12 SERPL-MCNC: 407 PG/ML (ref 193–986)
WBC # BLD AUTO: 5.1 K/UL (ref 4.1–11.1)

## 2023-07-21 NOTE — TELEPHONE ENCOUNTER
Received fax from [address] [place] [address] requesting cardiac and medication clearance to hold Eliquis 3 days prior and resume day after epidural steroid injection.     Fax: [phone]

## 2023-07-24 ENCOUNTER — HOSPITAL ENCOUNTER (OUTPATIENT)
Facility: HOSPITAL | Age: 78
Setting detail: RECURRING SERIES
Discharge: HOME | End: 2023-07-27
Payer: MEDICARE

## 2023-07-24 PROCEDURE — 97110 THERAPEUTIC EXERCISES: CPT

## 2023-07-24 NOTE — PROGRESS NOTES
PHYSICAL THERAPY - MEDICARE DAILY TREATMENT NOTE (updated 3/23)      Date: 2023          Patient Name:  Lucia Hardin :  1945   Medical   Diagnosis:  Acute midline thoracic back pain [M54.6]  Radicular syndrome of left leg [M54.10] Treatment Diagnosis:  M54.42  LUMBAGO WITH SCIATICA, LEFT SIDE    Referral Source:  Cecille Severs, MD Insurance:   Payor: MEDICARE / Plan: MEDICARE PART A AND B / Product Type: *No Product type* /                     Patient  verified yes     Visit #   Current  / Total 11 NA   Time   In / Out 2:30 p 3:25 p   Total Treatment Time 55   Total Timed Codes 45   1:1 Treatment Time 39      [facility] Totals Reminder:  bill using total billable   min of TIMED therapeutic procedures and modalities. 8-22 min = 1 unit; 23-37 min = 2 units; 38-52 min = 3 units; 53-67 min = 4 units; 68-82 min = 5 units            SUBJECTIVE    Pain Level (0-10 scale): 4    Any medication changes, allergies to medications, adverse drug reactions, diagnosis change, or new procedure performed?: [x] No    [] Yes (see summary sheet for update)  Medications: Verified on Patient Summary List    Subjective functional status/changes:     Patient reports that he received injections on Friday but does not think they helped. He will be seeing Dr. Aracelis Johnson in 2 weeks to decide next steps. OBJECTIVE      Therapeutic Procedures: Tx Min Billable or 1:1 Min (if diff from Tx Min) Procedure, Rationale, Specifics   45  11704 Therapeutic Exercise (timed):  increase ROM, strength, coordination, balance, and proprioception to improve patient's ability to progress to PLOF and address remaining functional goals.  (see flow sheet as applicable)     Details if applicable:     0    92937 Neuromuscular Re-Education (timed):  improve balance, coordination, kinesthetic sense, posture, core stability and proprioception to improve patient's ability to develop conscious control of individual muscles and awareness of position of

## 2023-07-31 ENCOUNTER — HOSPITAL ENCOUNTER (OUTPATIENT)
Facility: HOSPITAL | Age: 78
Setting detail: RECURRING SERIES
Discharge: HOME | End: 2023-08-03
Payer: MEDICARE

## 2023-07-31 PROCEDURE — 97110 THERAPEUTIC EXERCISES: CPT

## 2023-07-31 NOTE — PROGRESS NOTES
PHYSICAL THERAPY - MEDICARE DAILY TREATMENT NOTE (updated 3/23)      Date: 2023          Patient Name:  Pk Fitch :  1945   Medical   Diagnosis:  Acute midline thoracic back pain [M54.6]  Radicular syndrome of left leg [M54.10] Treatment Diagnosis:  M54.42  LUMBAGO WITH SCIATICA, LEFT SIDE    Referral Source:  Eleonora Kam MD Insurance:   Payor: MEDICARE / Plan: MEDICARE PART A AND B / Product Type: *No Product type* /                     Patient  verified yes     Visit #   Current  / Total 12 NA   Time   In / Out 2:00p 2:55 p   Total Treatment Time 55   Total Timed Codes 45   1:1 Treatment Time 39      [facility] Totals Reminder:  bill using total billable   min of TIMED therapeutic procedures and modalities. 8-22 min = 1 unit; 23-37 min = 2 units; 38-52 min = 3 units; 53-67 min = 4 units; 68-82 min = 5 units            SUBJECTIVE    Pain Level (0-10 scale): 4    Any medication changes, allergies to medications, adverse drug reactions, diagnosis change, or new procedure performed?: [x] No    [] Yes (see summary sheet for update)  Medications: Verified on Patient Summary List    Subjective functional status/changes:     Patient reports that his back has been bothering him a little more the past couple days. End of last week the HEP helped a lot. OBJECTIVE      Therapeutic Procedures: Tx Min Billable or 1:1 Min (if diff from Tx Min) Procedure, Rationale, Specifics   45  10835 Therapeutic Exercise (timed):  increase ROM, strength, coordination, balance, and proprioception to improve patient's ability to progress to PLOF and address remaining functional goals.  (see flow sheet as applicable)     Details if applicable:     0    71421 Neuromuscular Re-Education (timed):  improve balance, coordination, kinesthetic sense, posture, core stability and proprioception to improve patient's ability to develop conscious control of individual muscles and awareness of position of extremities in order to

## 2023-08-01 ENCOUNTER — TELEPHONE (OUTPATIENT)
Age: 78
End: 2023-08-01

## 2023-08-01 NOTE — TELEPHONE ENCOUNTER
Spoke with Meme/Spouse (HIPPA VERIFIED) and she reports patient is having worsening confusion. She reports he has a DX of ADD some years ago but refused treatment at that time. She reports he is having memory loss short and long term that has been gong on for sometime but is worsening. She reports that he is now requesting some follow up in regards to the memory loss and he is accepting that it is an issue that needs to be addressed. Scheduled for 9/6/23 at 3:00 PM. Grateful for the call.

## 2023-08-07 ENCOUNTER — HOSPITAL ENCOUNTER (OUTPATIENT)
Facility: HOSPITAL | Age: 78
Setting detail: RECURRING SERIES
Discharge: HOME | End: 2023-08-10
Payer: MEDICARE

## 2023-08-07 PROCEDURE — 97110 THERAPEUTIC EXERCISES: CPT

## 2023-08-07 NOTE — PROGRESS NOTES
Physical Therapy at [facility],   a part of [address] [place] [address] [address]  Phone: [phone]  Fax: [phone]  DISCHARGE SUMMARY  Patient Name: Jammie Zarate : 1945   Treatment/Medical Diagnosis: Acute midline thoracic back pain [M54.6]  Radicular syndrome of left leg [M54.10]   Referral Source: Timothy Gant MD     Date of Initial Visit: 23 Attended Visits: 13 Missed Visits: 0     SUMMARY OF TREATMENT  At time of discharge, patient has met all short term goals and 1/3 long term goals. He has reached a plateau in progress and continues to report high pain, especially levels in the morning. He is independent with HEP performance and demonstrated overall improvement in proximal LE strength, slight regression in distal strength. He will follow up with orthopedics later this week for further intervention decision. He has maximized therapeutic benefit at this time. CURRENT STATUS    Short Term Goals: To be accomplished in 4 weeks  Patient will be independent with initial HEP in order to transition to general wellness program. MET  Patient will report worst pain level of 6/10 or better to increased QOL and tolerance for sleeping through the night. MET  The patient will demonstrate tandem stance for 30 seconds to reduce fall risk when ambulating on the grass in his yard while performing yard work. MET     Long Term Goals: To be accomplished in 12 weeks  1. Patient will report worst pain no greater than 2/10 to increase QOL and allow for independence with all hygienic self-care and ADL skills Not met   2. Patient will demonstrate 5/5 BLE strength to allow for home cleaning skills independently and without rest breaks needed Not met   3. Patient will demonstrate pain free lumbar AROM WFL to improve ease with household chores like emptying the .  MET      RECOMMENDATIONS  Discontinue therapy due to lack of appreciable
Side Bending                          WNL B                                                                     Rotation                                   WNL B     Balance:                                                  R          L  SLS                                         5  3, pain - regression B  Tandem                                   30 seconds B                                                     MMT: improved proximal, regressed distal                                                   R          L     Hip flexion                               4+        4  Knee extension                       4+          5  Knee flexion                            5        4+  Dorsiflexion                             4-       3+  Plantarflexion                          4+               4+     Hip ER                                     4+         4+  Hip abduction                          4+          4+, min p! Hip extension                          4          4                                           Objective/Functional Outcome Measure: lumbar  FOTO Score: 52%- regression     Pain Level at end of session (0-10 scale): 2    Assessment   See discharge summary. Progress toward goals / Updated goals:  []  See Progress Note/Recertification    Short Term Goals: To be accomplished in 4 weeks  Patient will be independent with initial HEP in order to transition to general wellness program. MET  Patient will report worst pain level of 6/10 or better to increased QOL and tolerance for sleeping through the night. MET  The patient will demonstrate tandem stance for 30 seconds to reduce fall risk when ambulating on the grass in his yard while performing yard work. MET     Long Term Goals: To be accomplished in 12 weeks  1. Patient will report worst pain no greater than 2/10 to increase QOL and allow for independence with all hygienic self-care and ADL skills Not met

## 2023-08-11 NOTE — PROGRESS NOTES
MA called [facility] to get an update on a referral that our office sent over to them. Scheduled for 08/28/23 @ 3. Notified the provider.     Electronically signed by Tess Sepulveda MA on 8/11/2023 at 10:27 AM NEUROLOGY CLINIC NOTE    Patient ID:  Salma Hubbard  [id number]  51 y.o.  1945    Date of Consultation:  December 11, 2020    Referring Physician: Dr. Nancy Guy    Reason for Consultation:  Feet pain    Chief Complaint   Patient presents with    New Patient     idiopathic peripheral neuropathy        History of Present Illness:     Patient Active Problem List    Diagnosis Date Noted    Valvular cardiomyopathy ([facility] [facility] 10/12/2020    Non-rheumatic mitral regurgitation 10/12/2020    Retinal disease     DDD (degenerative disc disease), lumbar     Diverticulosis     Grief reaction     History of obstructive sleep apnea     Hyperplastic colon polyp     Idiopathic peripheral neuropathy     Long term (current) use of anticoagulants     Plantar fasciitis     Vitamin B12 deficiency     ED (erectile dysfunction) 04/08/2014    Hypercholesterolemia 04/08/2014    Enlarged prostate 01/01/2014    Atrial fibrillation ([facility] [facility] 07/29/2013    History of mitral valve repair 01/01/2013     Past Medical History:   Diagnosis Date    Atrial fibrillation ([facility] [address] 07/29/2013    Holter 12/9/13    DDD (degenerative disc disease), lumbar     Diverticulosis     ED (erectile dysfunction) 04/08/2014    Enlarged prostate 2014    psa 1.02 9/2017    Grief reaction     son was murdered 2017    History of mitral valve repair 2013    echo 3/2019 stabkle. EF 50-55% mildly increased valvue pressure 11/2017    History of obstructive sleep apnea     states resolved after mitral replacement 2013    Hypercholesterolemia 04/08/2014    Hyperplastic colon polyp 2013    Idiopathic peripheral neuropathy     did not tolerate gabapentin, lyrica, cymbalta.   tried Nutrix    Long term (current) use of anticoagulants     Plantar fasciitis     Retinal disease     Dr. Travis Ramos. hx laser tx    Vitamin B12 deficiency     injections      Past Surgical History:   Procedure Laterality Date    HX COLONOSCOPY  04/03/2013    2 hyperplastic polyps    HX COLONOSCOPY  04/30/2018    2 polyps. Dr. Zelaya Pert  03/05/2013    [place]  normal coronaries, severe MR    HX LUMBAR FUSION  1984    L5-S1    HX MITRAL VALVULOPLASTY  2013    Mitral Valve Annuloplasty    HX ORTHOPAEDIC  2009    Shoulder      Prior to Admission medications    Medication Sig Start Date End Date Taking? Authorizing Provider   apixaban (Eliquis) 5 mg tablet Take 1 Tab by mouth two (2) times a day. 12/1/20  Yes Carl Zelaay MD   DULoxetine (Cymbalta) 30 mg capsule Take 1 Cap by mouth daily. Starting 12/9/20, decrease to once daily x 14 days, then take 1 every other day for 6 days, then stop 11/30/20  Yes Anjelica Odonnell MD   vit E acet/vit Bcomp,C/zinc (Z-BEC PO) Take  by mouth daily. Yes Eva, MD Ronnell   cyanocobalamin (VITAMIN B12) 1,000 mcg/mL injection INJECT 1 ML IN THE MUSCLE EVERY 30 DAYS. Patient taking differently: 1,000 mcg by IntraMUSCular route. 8/23/20  Yes Wendi Lazaro MD   tadalafil (CIALIS) 20 mg tablet Take 1 Tab by mouth as needed (ED). 4/25/19  Yes Anjelica Odonnell MD   magnesium 250 mg tab Take 300 mg by mouth daily. Yes Provider, Historical   pregabalin (LYRICA) 50 mg capsule Take 1 Cap by mouth daily. Max Daily Amount: 50 mg. 11/30/20. Take 1 pill daily for 5 days, then stop 11/30/20   Wendi Lazaro MD     Allergies   Allergen Reactions    Beta Blocker [Beta-Blockers (Beta-Adrenergic Blocking Agts)] Nausea and Vomiting      Social History     Tobacco Use    Smoking status: Never Smoker    Smokeless tobacco: Never Used   Substance Use Topics    Alcohol use: Yes     Alcohol/week: 7.0 standard drinks     Types: 7 Shots of liquor per week      No family history on file. Subjective:      Carmen Chris is a 77 y.o.  RHWM with history of atrial fibrillation, lumbar disc disease status post surgery, ED, diverticulosis, history of mitral valve repair, hypercholesterolemia, retinal disease, plantar fasciitis and B12 deficiency who was referred here by Dr. Claudetta Land for further evaluation and management of his feet pain. Gradually two years ago  Toes and heels and anterior foot on both sides  Numbness of the anterior foot. Sharp pain at the heel. Achy pain at the toes. Laying down at night it can be a 8/10 and prolonged sitting 8/10  2-3/10 while active and walking  Constant   Benefit with injections by podiatrist at the heels. No weakness or sense of wobbliness  Restlessness. Wife has noted that he moves a lot at night. Lyrica and Cymbalta prescribed by podiatrist offered no relief. No difference with or without it. Gabapentin - cognitive side effects and slowness    History of back surgery 1984. Complaint then was left sciatic and foot pain. Outside reports reviewed: office notes. Review of Systems:    A comprehensive review of systems was performed:   Constitutional: positive for none  Eyes: positive for none  Ears, nose, mouth, throat, and face: positive for none  Respiratory: positive for none  Cardiovascular: positive for none  Gastrointestinal: positive for none  Genitourinary: positive for none  Integument/breast: positive for none  Hematologic/lymphatic: positive for none  Musculoskeletal: positive for none  Neurological: positive for feet pain  Behavioral/Psych: positive for none  Endocrine: positive for none  Allergic/Immunologic: positive for none      Objective:     Visit Vitals  /82   Pulse 68   Temp 96.8 °F (36 °C)   Resp 20   Ht 6' 1\" (1.854 m)   Wt 86.2 kg (190 lb)   SpO2 98%   BMI 25.07 kg/m²       PHYSICAL EXAM:    General Appearance: Alert, patient appears stated age. General:  Well developed, well nourished, patient in no apparent distress. Head/Face: The head is normocephalic and atraumatic. Eyes: Conjunctivae appear normal. Sclera appear normal and non-icteric. Nose (and Sinus):   No abnormality of the nose or sinuses is noted. Oral:   Throat is clear.    Lymphatics:  No lymphadenopathy in the neck/head. Neck and Thyroid:   No bruits noted in the neck. Respiratory:  Lungs clear to auscultation. Cardiovascular:  Palpation and auscultation: regular rate and rhythm. Extremity: No joint swelling, erythema or pedal edema. NEUROLOGICAL EXAM:    Appearance: The patient is well developed, well nourished, provides a coherent history and is in no acute distress. Mental Status: Oriented to time, place and person. Fluent, no aphasia or dysarthria. Mood and affect appropriate. Cranial Nerves:   Intact visual fields. VIVI, EOM's full, no nystagmus, no ptosis. Facial sensation is normal. Corneal reflexes are intact. Facial movement is symmetric. Hearing is normal bilaterally. Palate is midline with normal elevation. Sternocleidomastoid and trapezius muscles are normal. Tongue is midline. Motor:  5/5 strength in upper and lower proximal and distal muscles. Normal bulk and tone. No fasciculations. No pronator drift. Reflexes:   Deep tendon reflexes 2+/4 and symmetrical. Downgoing toes. Sensory:   Normal to cold, pinprick and vibration with patchy loss in the feet. Gait:  Steady. No Romberg. Mild truncal instability and slight problems tandem walking. Tremor:   No tremor noted. Cerebellar:  Intact FTN/ROSENDO/HTS. Assessment:   Restless leg syndrome  Feet paresthesia    Plan:   Neurological examination reveals patchy sensory loss in the feet but no typical stocking distribution with mild truncal instability. Patient was however observed to constantly wiggle his toes and move his feet and legs. History is more consistent with restless leg syndrome. Likely in this case secondary due to previous history of lumbar disc disease. Discussed trial of medication for symptomatic relief. Retrial of gabapentin 300 mg at night initially and up to 600 mg at night if necessary. Prescriptions provided. Other options include Sinemet or dopamine agonist medications.     Need to also assess for possible peripheral neuropathy. EMG/NCS of bilateral lower extremity was ordered to further assess. Trial of symptomatic treatment as above. Patient was also advised to try localized creams or ointments such as 4% lidocaine or capsaicin to be applied 2-3 times a day. Fall precautions. All questions and concerns were answered.

## 2023-08-16 ENCOUNTER — HOSPITAL ENCOUNTER (OUTPATIENT)
Facility: HOSPITAL | Age: 78
Discharge: HOME | End: 2023-08-19
Payer: MEDICARE

## 2023-08-16 VITALS
HEIGHT: 72 IN | TEMPERATURE: 98 F | RESPIRATION RATE: 12 BRPM | SYSTOLIC BLOOD PRESSURE: 124 MMHG | WEIGHT: 178 LBS | BODY MASS INDEX: 24.11 KG/M2 | OXYGEN SATURATION: 99 % | DIASTOLIC BLOOD PRESSURE: 94 MMHG

## 2023-08-16 LAB
25(OH)D3 SERPL-MCNC: 36.5 NG/ML (ref 30–100)
ABO + RH BLD: NORMAL
ALBUMIN SERPL-MCNC: 4.2 G/DL (ref 3.5–5)
ALBUMIN/GLOB SERPL: 1.4 (ref 1.1–2.2)
ALP SERPL-CCNC: 82 U/L (ref 45–117)
ALT SERPL-CCNC: 26 U/L (ref 12–78)
ANION GAP SERPL CALC-SCNC: 2 MMOL/L (ref 5–15)
APPEARANCE UR: CLEAR
APTT PPP: 31.9 SEC (ref 22.1–31)
AST SERPL-CCNC: 23 U/L (ref 15–37)
BACTERIA URNS QL MICRO: NEGATIVE /HPF
BASOPHILS # BLD: 0.1 K/UL (ref 0–0.1)
BASOPHILS NFR BLD: 1 % (ref 0–1)
BILIRUB SERPL-MCNC: 1.1 MG/DL (ref 0.2–1)
BILIRUB UR QL: NEGATIVE
BLOOD GROUP ANTIBODIES SERPL: NORMAL
BUN SERPL-MCNC: 15 MG/DL (ref 6–20)
BUN/CREAT SERPL: 15 (ref 12–20)
CALCIUM SERPL-MCNC: 10 MG/DL (ref 8.5–10.1)
CHLORIDE SERPL-SCNC: 105 MMOL/L (ref 97–108)
CO2 SERPL-SCNC: 32 MMOL/L (ref 21–32)
COLOR UR: NORMAL
CREAT SERPL-MCNC: 1 MG/DL (ref 0.7–1.3)
DIFFERENTIAL METHOD BLD: ABNORMAL
EKG ATRIAL RATE: 344 BPM
EKG DIAGNOSIS: NORMAL
EKG Q-T INTERVAL: 408 MS
EKG QRS DURATION: 112 MS
EKG QTC CALCULATION (BAZETT): 459 MS
EKG R AXIS: -32 DEGREES
EKG T AXIS: 70 DEGREES
EKG VENTRICULAR RATE: 76 BPM
EOSINOPHIL # BLD: 0 K/UL (ref 0–0.4)
EOSINOPHIL NFR BLD: 1 % (ref 0–7)
EPITH CASTS URNS QL MICRO: NORMAL /LPF
ERYTHROCYTE [DISTWIDTH] IN BLOOD BY AUTOMATED COUNT: 12.8 % (ref 11.5–14.5)
EST. AVERAGE GLUCOSE BLD GHB EST-MCNC: 105 MG/DL
GLOBULIN SER CALC-MCNC: 3 G/DL (ref 2–4)
GLUCOSE SERPL-MCNC: 99 MG/DL (ref 65–100)
GLUCOSE UR STRIP.AUTO-MCNC: NEGATIVE MG/DL
HBA1C MFR BLD: 5.3 % (ref 4–5.6)
HCT VFR BLD AUTO: 40.6 % (ref 36.6–50.3)
HGB BLD-MCNC: 13 G/DL (ref 12.1–17)
HGB UR QL STRIP: NEGATIVE
HYALINE CASTS URNS QL MICRO: NORMAL /LPF (ref 0–2)
IMM GRANULOCYTES # BLD AUTO: 0 K/UL (ref 0–0.04)
IMM GRANULOCYTES NFR BLD AUTO: 0 % (ref 0–0.5)
INR PPP: 1.1 (ref 0.9–1.1)
KETONES UR QL STRIP.AUTO: NEGATIVE MG/DL
LEUKOCYTE ESTERASE UR QL STRIP.AUTO: NEGATIVE
LYMPHOCYTES # BLD: 0.9 K/UL (ref 0.8–3.5)
LYMPHOCYTES NFR BLD: 20 % (ref 12–49)
MCH RBC QN AUTO: 32.4 PG (ref 26–34)
MCHC RBC AUTO-ENTMCNC: 32 G/DL (ref 30–36.5)
MCV RBC AUTO: 101.2 FL (ref 80–99)
MONOCYTES # BLD: 0.4 K/UL (ref 0–1)
MONOCYTES NFR BLD: 10 % (ref 5–13)
NEUTS SEG # BLD: 3 K/UL (ref 1.8–8)
NEUTS SEG NFR BLD: 68 % (ref 32–75)
NITRITE UR QL STRIP.AUTO: NEGATIVE
NRBC # BLD: 0 K/UL (ref 0–0.01)
NRBC BLD-RTO: 0 PER 100 WBC
PH UR STRIP: 7 (ref 5–8)
PLATELET # BLD AUTO: 144 K/UL (ref 150–400)
PMV BLD AUTO: 12.4 FL (ref 8.9–12.9)
POTASSIUM SERPL-SCNC: 4.7 MMOL/L (ref 3.5–5.1)
PROT SERPL-MCNC: 7.2 G/DL (ref 6.4–8.2)
PROT UR STRIP-MCNC: NEGATIVE MG/DL
PROTHROMBIN TIME: 11.7 SEC (ref 9–11.1)
RBC # BLD AUTO: 4.01 M/UL (ref 4.1–5.7)
RBC #/AREA URNS HPF: NORMAL /HPF (ref 0–5)
SODIUM SERPL-SCNC: 139 MMOL/L (ref 136–145)
SP GR UR REFRACTOMETRY: 1.01 (ref 1–1.03)
SPECIMEN EXP DATE BLD: NORMAL
THERAPEUTIC RANGE: ABNORMAL SECS (ref 58–77)
URINE CULTURE IF INDICATED: NORMAL
UROBILINOGEN UR QL STRIP.AUTO: 1 EU/DL (ref 0.2–1)
WBC # BLD AUTO: 4.4 K/UL (ref 4.1–11.1)
WBC URNS QL MICRO: NORMAL /HPF (ref 0–4)

## 2023-08-16 PROCEDURE — 86850 RBC ANTIBODY SCREEN: CPT

## 2023-08-16 PROCEDURE — 82306 VITAMIN D 25 HYDROXY: CPT

## 2023-08-16 PROCEDURE — 81001 URINALYSIS AUTO W/SCOPE: CPT

## 2023-08-16 PROCEDURE — 85025 COMPLETE CBC W/AUTO DIFF WBC: CPT

## 2023-08-16 PROCEDURE — 93005 ELECTROCARDIOGRAM TRACING: CPT | Performed by: ORTHOPAEDIC SURGERY

## 2023-08-16 PROCEDURE — 93010 ELECTROCARDIOGRAM REPORT: CPT | Performed by: SPECIALIST

## 2023-08-16 PROCEDURE — 86901 BLOOD TYPING SEROLOGIC RH(D): CPT

## 2023-08-16 PROCEDURE — 85610 PROTHROMBIN TIME: CPT

## 2023-08-16 PROCEDURE — 85730 THROMBOPLASTIN TIME PARTIAL: CPT

## 2023-08-16 PROCEDURE — 36415 COLL VENOUS BLD VENIPUNCTURE: CPT

## 2023-08-16 PROCEDURE — 80053 COMPREHEN METABOLIC PANEL: CPT

## 2023-08-16 PROCEDURE — 86900 BLOOD TYPING SEROLOGIC ABO: CPT

## 2023-08-16 PROCEDURE — 83036 HEMOGLOBIN GLYCOSYLATED A1C: CPT

## 2023-08-16 RX ORDER — TRAMADOL HYDROCHLORIDE 50 MG/1
50 TABLET ORAL EVERY 6 HOURS PRN
COMMUNITY

## 2023-08-16 ASSESSMENT — ENCOUNTER SYMPTOMS
BLOOD IN STOOL: 0
VOMITING: 0
SORE THROAT: 0
WHEEZING: 0
NAUSEA: 0
BACK PAIN: 1
TROUBLE SWALLOWING: 0
COUGH: 0
ABDOMINAL PAIN: 0
SHORTNESS OF BREATH: 0

## 2023-08-16 ASSESSMENT — PAIN SCALES - GENERAL: PAINLEVEL_OUTOF10: 4

## 2023-08-16 NOTE — H&P
surgical procedure. Additional clearance from specialists may be required based on findings from this examination. According to the 2014 ACC/AHA pre-operative risk assessment guidelines Perla Cooper is at low risk for major cardiac complications during a intermediate procedure, exercise tolerance is >4 METS.     Request further recommendations from consultants: Cardiology

## 2023-08-16 NOTE — PROGRESS NOTES
Michelle Harris re addendum to last encounter for eliquis plan & risk stratification. He had given both in July of 2023 prior to LESI. Gave pt 3d hold instructions during PAT today based on that note by cardiology & no change in pt CV status.

## 2023-08-16 NOTE — PROGRESS NOTES
[address]  PRE-ADMISSION TESTING    [phone]     Surgery Date:   Thursday, 8/24    [facility] staff will call you between 3 and 7pm the day before your surgery with your arrival time. Call [phone] after 7pm Wednesday if you did not receive this call. INSTRUCTIONS BEFORE YOUR SURGERY   When You  Arrive Please take advantage of our free  service when you arrive, they open at 7a.m. Proceed to the 2nd Floor Admitting Desk on the day of your surgery. Have your insurance card, photo ID, and any copayment (if needed). Food   and   Drink NO food or drink after midnight the night before surgery. This means NO water, gum, mints, coffee, juice, etc. No alcohol (beer, wine, liquor) 24 hours before or after surgery. Medications to   TAKE   Morning of Surgery   tramadol  Tylenol/acetaminophen products may be taken for pain, if needed   Medications  To  STOP  7 days before surgery Non-Steroidal Anti-Inflammatory Drugs (NSAIDs): Ibuprofen (Advil, Motrin), Naproxen (Aleve)  Aspirin containing products for pain   Herbal supplements, vitamins, and fish oil  This includes your meloxicam, multivitamin, & B12     Special Instructions As instructed by Yuridia Gone, your last eliquis dose will be Sunday gina, 8/20. Galileo will start you back on it asap after surgery. Bathing Clothing  Jewelry  Valuables     If you shower the morning of surgery, please do not apply anything to your skin (lotions, powders, deodorant, or makeup, especially mascara). Do not shave or trim any body part 24 hours before surgery. Leave money, valuables, and jewelry, including body piercings, at home. Wear loose, comfortable clothes. Follow Chlorhexidine Care Fusion body wash instructions provided to you during PAT appointment. Begin 3 days prior to surgery.   Day of surgery:  please bring Medication List (with dates/times of last doses) & your Incentive

## 2023-08-17 LAB
BACTERIA SPEC CULT: NORMAL
BACTERIA SPEC CULT: NORMAL
SERVICE CMNT-IMP: NORMAL

## 2023-08-17 NOTE — PERIOP NOTE
8/17/23  Call to patient, Eliquis instructions reviewed as noted per Dr. Agustina Lugo.     8/18/23  Patient aware that prescription has been sent to pharmacy for pickup per Vannie Gaucher NP and to use over the counter CHG for 2 additional days (or use dial soap if unable to find CHG at pharmacy).

## 2023-08-18 NOTE — PERIOP NOTE
+MSSA on nasal culture; Rx for Mupirocin ointment sent to patient's pharmacy to use twice daily for 5 days.

## 2023-08-23 ENCOUNTER — TELEPHONE (OUTPATIENT)
Age: 78
End: 2023-08-23

## 2023-08-23 NOTE — TELEPHONE ENCOUNTER
Patients recent office note for 7/3/23 faxed to F# [phone]  Abisai Salas at [facility] as requested for continuity of care.

## 2023-08-23 NOTE — TELEPHONE ENCOUNTER
----- Message from Abenasaud Baum sent at 8/22/2023  3:20 PM EDT -----  Subject: Message to Provider    QUESTIONS  Information for Provider? Ruthie Rosario called in requesting apt notes from pt's   apt with DR Manish Oh on 7/3/23. Her fax number is the same as her phone   number? [phone]. Please advise.   ---------------------------------------------------------------------------  --------------  Luiza Mclaughlin INFO  [phone]; OK to leave message on voicemail  ---------------------------------------------------------------------------  --------------  SCRIPT ANSWERS  Relationship to Patient? Covered Entity  Covered Entity Type? Home Health Care? Representative Name?  [address]

## 2023-08-24 ENCOUNTER — ANESTHESIA EVENT (OUTPATIENT)
Facility: HOSPITAL | Age: 78
End: 2023-08-24
Payer: MEDICARE

## 2023-08-24 ENCOUNTER — APPOINTMENT (OUTPATIENT)
Facility: HOSPITAL | Age: 78
DRG: 460 | End: 2023-08-24
Attending: ORTHOPAEDIC SURGERY
Payer: MEDICARE

## 2023-08-24 ENCOUNTER — HOSPITAL ENCOUNTER (INPATIENT)
Facility: HOSPITAL | Age: 78
LOS: 2 days | Discharge: HOME | DRG: 460 | End: 2023-08-26
Attending: ORTHOPAEDIC SURGERY | Admitting: ORTHOPAEDIC SURGERY
Payer: MEDICARE

## 2023-08-24 ENCOUNTER — ANESTHESIA (OUTPATIENT)
Facility: HOSPITAL | Age: 78
End: 2023-08-24
Payer: MEDICARE

## 2023-08-24 DIAGNOSIS — I48.19 PERSISTENT ATRIAL FIBRILLATION (HCC): ICD-10-CM

## 2023-08-24 DIAGNOSIS — M71.38 SYNOVIAL CYST OF LUMBAR SPINE: ICD-10-CM

## 2023-08-24 DIAGNOSIS — M48.061 DEGENERATIVE LUMBAR SPINAL STENOSIS: Primary | ICD-10-CM

## 2023-08-24 PROCEDURE — 7100000000 HC PACU RECOVERY - FIRST 15 MIN: Performed by: ORTHOPAEDIC SURGERY

## 2023-08-24 PROCEDURE — C1713 ANCHOR/SCREW BN/BN,TIS/BN: HCPCS | Performed by: ORTHOPAEDIC SURGERY

## 2023-08-24 PROCEDURE — 3700000001 HC ADD 15 MINUTES (ANESTHESIA): Performed by: ORTHOPAEDIC SURGERY

## 2023-08-24 PROCEDURE — 2720000010 HC SURG SUPPLY STERILE: Performed by: ORTHOPAEDIC SURGERY

## 2023-08-24 PROCEDURE — G0378 HOSPITAL OBSERVATION PER HR: HCPCS

## 2023-08-24 PROCEDURE — 74018 RADEX ABDOMEN 1 VIEW: CPT

## 2023-08-24 PROCEDURE — 6360000002 HC RX W HCPCS: Performed by: NURSE PRACTITIONER

## 2023-08-24 PROCEDURE — 3600000014 HC SURGERY LEVEL 4 ADDTL 15MIN: Performed by: ORTHOPAEDIC SURGERY

## 2023-08-24 PROCEDURE — 6360000002 HC RX W HCPCS: Performed by: ANESTHESIOLOGY

## 2023-08-24 PROCEDURE — 3700000000 HC ANESTHESIA ATTENDED CARE: Performed by: ORTHOPAEDIC SURGERY

## 2023-08-24 PROCEDURE — 6370000000 HC RX 637 (ALT 250 FOR IP): Performed by: NURSE PRACTITIONER

## 2023-08-24 PROCEDURE — 2580000003 HC RX 258: Performed by: NURSE PRACTITIONER

## 2023-08-24 PROCEDURE — 2580000003 HC RX 258: Performed by: ORTHOPAEDIC SURGERY

## 2023-08-24 PROCEDURE — 7100000001 HC PACU RECOVERY - ADDTL 15 MIN: Performed by: ORTHOPAEDIC SURGERY

## 2023-08-24 PROCEDURE — 88305 TISSUE EXAM BY PATHOLOGIST: CPT

## 2023-08-24 PROCEDURE — C9399 UNCLASSIFIED DRUGS OR BIOLOG: HCPCS | Performed by: NURSE ANESTHETIST, CERTIFIED REGISTERED

## 2023-08-24 PROCEDURE — 2500000003 HC RX 250 WO HCPCS: Performed by: NURSE ANESTHETIST, CERTIFIED REGISTERED

## 2023-08-24 PROCEDURE — 6360000002 HC RX W HCPCS: Performed by: ORTHOPAEDIC SURGERY

## 2023-08-24 PROCEDURE — 2580000003 HC RX 258: Performed by: NURSE ANESTHETIST, CERTIFIED REGISTERED

## 2023-08-24 PROCEDURE — 2709999900 HC NON-CHARGEABLE SUPPLY: Performed by: ORTHOPAEDIC SURGERY

## 2023-08-24 PROCEDURE — 3600000004 HC SURGERY LEVEL 4 BASE: Performed by: ORTHOPAEDIC SURGERY

## 2023-08-24 PROCEDURE — 6360000002 HC RX W HCPCS: Performed by: NURSE ANESTHETIST, CERTIFIED REGISTERED

## 2023-08-24 PROCEDURE — 88311 DECALCIFY TISSUE: CPT

## 2023-08-24 PROCEDURE — P9045 ALBUMIN (HUMAN), 5%, 250 ML: HCPCS | Performed by: NURSE ANESTHETIST, CERTIFIED REGISTERED

## 2023-08-24 PROCEDURE — 1100000000 HC RM PRIVATE

## 2023-08-24 PROCEDURE — C9290 INJ, BUPIVACAINE LIPOSOME: HCPCS | Performed by: ORTHOPAEDIC SURGERY

## 2023-08-24 PROCEDURE — 2580000003 HC RX 258: Performed by: ANESTHESIOLOGY

## 2023-08-24 DEVICE — SHANK 55900205545 5.5 X 45 ATS THREAD
Type: IMPLANTABLE DEVICE | Site: SPINE LUMBAR | Status: FUNCTIONAL
Brand: CD HORIZON™ MODULEX™ SPINAL SYSTEM

## 2023-08-24 DEVICE — GRAFT BONE 5 CC: Type: IMPLANTABLE DEVICE | Site: SPINE LUMBAR | Status: FUNCTIONAL

## 2023-08-24 DEVICE — GRAFT BONE 10 CC: Type: IMPLANTABLE DEVICE | Site: SPINE LUMBAR | Status: FUNCTIONAL

## 2023-08-24 DEVICE — ROD 651003030 5.5 CCM CAPPED ROD 30MM
Type: IMPLANTABLE DEVICE | Site: SPINE LUMBAR | Status: FUNCTIONAL
Brand: CD HORIZON® SOLERA® SPINAL SYSTEM

## 2023-08-24 DEVICE — GRAFT BNE 30CC 1 8MM CANC CRUSH CHIP READIGRFT: Type: IMPLANTABLE DEVICE | Site: SPINE LUMBAR | Status: FUNCTIONAL

## 2023-08-24 RX ORDER — ONDANSETRON 2 MG/ML
INJECTION INTRAMUSCULAR; INTRAVENOUS PRN
Status: DISCONTINUED | OUTPATIENT
Start: 2023-08-24 | End: 2023-08-24 | Stop reason: SDUPTHER

## 2023-08-24 RX ORDER — SODIUM CHLORIDE 0.9 % (FLUSH) 0.9 %
5-40 SYRINGE (ML) INJECTION EVERY 12 HOURS SCHEDULED
Status: DISCONTINUED | OUTPATIENT
Start: 2023-08-24 | End: 2023-08-26 | Stop reason: HOSPADM

## 2023-08-24 RX ORDER — SODIUM CHLORIDE 9 MG/ML
INJECTION, SOLUTION INTRAVENOUS CONTINUOUS
Status: DISCONTINUED | OUTPATIENT
Start: 2023-08-24 | End: 2023-08-26 | Stop reason: HOSPADM

## 2023-08-24 RX ORDER — ACETAMINOPHEN 500 MG
1000 TABLET ORAL EVERY 6 HOURS
Status: DISCONTINUED | OUTPATIENT
Start: 2023-08-24 | End: 2023-08-26 | Stop reason: HOSPADM

## 2023-08-24 RX ORDER — KETOROLAC TROMETHAMINE 30 MG/ML
15 INJECTION, SOLUTION INTRAMUSCULAR; INTRAVENOUS EVERY 6 HOURS
Status: COMPLETED | OUTPATIENT
Start: 2023-08-24 | End: 2023-08-25

## 2023-08-24 RX ORDER — ONDANSETRON 2 MG/ML
4 INJECTION INTRAMUSCULAR; INTRAVENOUS
Status: DISCONTINUED | OUTPATIENT
Start: 2023-08-24 | End: 2023-08-24 | Stop reason: HOSPADM

## 2023-08-24 RX ORDER — DIPHENHYDRAMINE HYDROCHLORIDE 50 MG/ML
25 INJECTION INTRAMUSCULAR; INTRAVENOUS EVERY 6 HOURS PRN
Status: DISCONTINUED | OUTPATIENT
Start: 2023-08-24 | End: 2023-08-26 | Stop reason: HOSPADM

## 2023-08-24 RX ORDER — SODIUM CHLORIDE, SODIUM LACTATE, POTASSIUM CHLORIDE, CALCIUM CHLORIDE 600; 310; 30; 20 MG/100ML; MG/100ML; MG/100ML; MG/100ML
INJECTION, SOLUTION INTRAVENOUS CONTINUOUS
Status: DISCONTINUED | OUTPATIENT
Start: 2023-08-24 | End: 2023-08-25

## 2023-08-24 RX ORDER — SODIUM CHLORIDE 9 MG/ML
INJECTION, SOLUTION INTRAVENOUS PRN
Status: DISCONTINUED | OUTPATIENT
Start: 2023-08-24 | End: 2023-08-26 | Stop reason: HOSPADM

## 2023-08-24 RX ORDER — SUCCINYLCHOLINE/SOD CL,ISO/PF 100 MG/5ML
SYRINGE (ML) INTRAVENOUS PRN
Status: DISCONTINUED | OUTPATIENT
Start: 2023-08-24 | End: 2023-08-24 | Stop reason: SDUPTHER

## 2023-08-24 RX ORDER — MORPHINE SULFATE 2 MG/ML
2 INJECTION, SOLUTION INTRAMUSCULAR; INTRAVENOUS
Status: DISCONTINUED | OUTPATIENT
Start: 2023-08-24 | End: 2023-08-26 | Stop reason: HOSPADM

## 2023-08-24 RX ORDER — PHENYLEPHRINE HCL IN 0.9% NACL 0.4MG/10ML
SYRINGE (ML) INTRAVENOUS PRN
Status: DISCONTINUED | OUTPATIENT
Start: 2023-08-24 | End: 2023-08-24 | Stop reason: SDUPTHER

## 2023-08-24 RX ORDER — FAMOTIDINE 20 MG/1
20 TABLET, FILM COATED ORAL 2 TIMES DAILY
Status: DISCONTINUED | OUTPATIENT
Start: 2023-08-24 | End: 2023-08-26 | Stop reason: HOSPADM

## 2023-08-24 RX ORDER — HYDROMORPHONE HYDROCHLORIDE 2 MG/ML
INJECTION, SOLUTION INTRAMUSCULAR; INTRAVENOUS; SUBCUTANEOUS PRN
Status: DISCONTINUED | OUTPATIENT
Start: 2023-08-24 | End: 2023-08-24 | Stop reason: SDUPTHER

## 2023-08-24 RX ORDER — SODIUM CHLORIDE 9 MG/ML
INJECTION, SOLUTION INTRAVENOUS CONTINUOUS PRN
Status: DISCONTINUED | OUTPATIENT
Start: 2023-08-24 | End: 2023-08-24 | Stop reason: SDUPTHER

## 2023-08-24 RX ORDER — BISACODYL 5 MG/1
5 TABLET, DELAYED RELEASE ORAL DAILY PRN
Status: DISCONTINUED | OUTPATIENT
Start: 2023-08-24 | End: 2023-08-26 | Stop reason: HOSPADM

## 2023-08-24 RX ORDER — LIDOCAINE HYDROCHLORIDE 10 MG/ML
1 INJECTION, SOLUTION EPIDURAL; INFILTRATION; INTRACAUDAL; PERINEURAL
Status: DISCONTINUED | OUTPATIENT
Start: 2023-08-24 | End: 2023-08-24 | Stop reason: HOSPADM

## 2023-08-24 RX ORDER — BISACODYL 10 MG
10 SUPPOSITORY, RECTAL RECTAL DAILY PRN
Status: DISCONTINUED | OUTPATIENT
Start: 2023-08-24 | End: 2023-08-26 | Stop reason: HOSPADM

## 2023-08-24 RX ORDER — ONDANSETRON 2 MG/ML
4 INJECTION INTRAMUSCULAR; INTRAVENOUS EVERY 6 HOURS PRN
Status: DISCONTINUED | OUTPATIENT
Start: 2023-08-24 | End: 2023-08-26 | Stop reason: HOSPADM

## 2023-08-24 RX ORDER — CYCLOBENZAPRINE HCL 10 MG
10 TABLET ORAL EVERY 12 HOURS PRN
Status: DISCONTINUED | OUTPATIENT
Start: 2023-08-24 | End: 2023-08-26 | Stop reason: HOSPADM

## 2023-08-24 RX ORDER — HEPARIN SODIUM 1000 [USP'U]/ML
INJECTION, SOLUTION INTRAVENOUS; SUBCUTANEOUS PRN
Status: DISCONTINUED | OUTPATIENT
Start: 2023-08-24 | End: 2023-08-24 | Stop reason: ALTCHOICE

## 2023-08-24 RX ORDER — TAMSULOSIN HYDROCHLORIDE 0.4 MG/1
0.4 CAPSULE ORAL DAILY
Status: DISCONTINUED | OUTPATIENT
Start: 2023-08-24 | End: 2023-08-25

## 2023-08-24 RX ORDER — FENTANYL CITRATE 50 UG/ML
100 INJECTION, SOLUTION INTRAMUSCULAR; INTRAVENOUS
Status: DISCONTINUED | OUTPATIENT
Start: 2023-08-24 | End: 2023-08-24 | Stop reason: HOSPADM

## 2023-08-24 RX ORDER — MORPHINE SULFATE 4 MG/ML
4 INJECTION, SOLUTION INTRAMUSCULAR; INTRAVENOUS
Status: DISCONTINUED | OUTPATIENT
Start: 2023-08-24 | End: 2023-08-26 | Stop reason: HOSPADM

## 2023-08-24 RX ORDER — EPHEDRINE SULFATE/0.9% NACL/PF 50 MG/5 ML
SYRINGE (ML) INTRAVENOUS PRN
Status: DISCONTINUED | OUTPATIENT
Start: 2023-08-24 | End: 2023-08-24 | Stop reason: SDUPTHER

## 2023-08-24 RX ORDER — ONDANSETRON 4 MG/1
4 TABLET, ORALLY DISINTEGRATING ORAL EVERY 8 HOURS PRN
Status: DISCONTINUED | OUTPATIENT
Start: 2023-08-24 | End: 2023-08-26 | Stop reason: HOSPADM

## 2023-08-24 RX ORDER — SODIUM CHLORIDE 0.9 % (FLUSH) 0.9 %
5-40 SYRINGE (ML) INJECTION PRN
Status: DISCONTINUED | OUTPATIENT
Start: 2023-08-24 | End: 2023-08-26 | Stop reason: HOSPADM

## 2023-08-24 RX ORDER — DIPHENHYDRAMINE HCL 25 MG
25 CAPSULE ORAL EVERY 6 HOURS PRN
Status: DISCONTINUED | OUTPATIENT
Start: 2023-08-24 | End: 2023-08-26 | Stop reason: HOSPADM

## 2023-08-24 RX ORDER — GLYCOPYRROLATE 0.2 MG/ML
INJECTION INTRAMUSCULAR; INTRAVENOUS PRN
Status: DISCONTINUED | OUTPATIENT
Start: 2023-08-24 | End: 2023-08-24 | Stop reason: SDUPTHER

## 2023-08-24 RX ORDER — LIDOCAINE HYDROCHLORIDE 20 MG/ML
INJECTION, SOLUTION EPIDURAL; INFILTRATION; INTRACAUDAL; PERINEURAL PRN
Status: DISCONTINUED | OUTPATIENT
Start: 2023-08-24 | End: 2023-08-24 | Stop reason: SDUPTHER

## 2023-08-24 RX ORDER — POLYETHYLENE GLYCOL 3350 17 G/17G
17 POWDER, FOR SOLUTION ORAL DAILY
Status: DISCONTINUED | OUTPATIENT
Start: 2023-08-24 | End: 2023-08-26 | Stop reason: HOSPADM

## 2023-08-24 RX ORDER — FENTANYL CITRATE 50 UG/ML
INJECTION, SOLUTION INTRAMUSCULAR; INTRAVENOUS PRN
Status: DISCONTINUED | OUTPATIENT
Start: 2023-08-24 | End: 2023-08-24 | Stop reason: SDUPTHER

## 2023-08-24 RX ORDER — PROPOFOL 10 MG/ML
INJECTION, EMULSION INTRAVENOUS PRN
Status: DISCONTINUED | OUTPATIENT
Start: 2023-08-24 | End: 2023-08-24 | Stop reason: SDUPTHER

## 2023-08-24 RX ORDER — SODIUM CHLORIDE, SODIUM LACTATE, POTASSIUM CHLORIDE, CALCIUM CHLORIDE 600; 310; 30; 20 MG/100ML; MG/100ML; MG/100ML; MG/100ML
INJECTION, SOLUTION INTRAVENOUS CONTINUOUS PRN
Status: DISCONTINUED | OUTPATIENT
Start: 2023-08-24 | End: 2023-08-24 | Stop reason: SDUPTHER

## 2023-08-24 RX ORDER — OXYCODONE HYDROCHLORIDE 5 MG/1
5 TABLET ORAL EVERY 4 HOURS PRN
Status: DISCONTINUED | OUTPATIENT
Start: 2023-08-24 | End: 2023-08-26 | Stop reason: HOSPADM

## 2023-08-24 RX ORDER — OXYCODONE HYDROCHLORIDE 5 MG/1
10 TABLET ORAL EVERY 4 HOURS PRN
Status: DISCONTINUED | OUTPATIENT
Start: 2023-08-24 | End: 2023-08-26 | Stop reason: HOSPADM

## 2023-08-24 RX ORDER — MAGNESIUM SULFATE HEPTAHYDRATE 40 MG/ML
INJECTION, SOLUTION INTRAVENOUS PRN
Status: DISCONTINUED | OUTPATIENT
Start: 2023-08-24 | End: 2023-08-24 | Stop reason: SDUPTHER

## 2023-08-24 RX ORDER — SODIUM CHLORIDE, SODIUM LACTATE, POTASSIUM CHLORIDE, CALCIUM CHLORIDE 600; 310; 30; 20 MG/100ML; MG/100ML; MG/100ML; MG/100ML
INJECTION, SOLUTION INTRAVENOUS CONTINUOUS
Status: DISCONTINUED | OUTPATIENT
Start: 2023-08-24 | End: 2023-08-24 | Stop reason: HOSPADM

## 2023-08-24 RX ORDER — ROCURONIUM BROMIDE 10 MG/ML
INJECTION, SOLUTION INTRAVENOUS PRN
Status: DISCONTINUED | OUTPATIENT
Start: 2023-08-24 | End: 2023-08-24 | Stop reason: SDUPTHER

## 2023-08-24 RX ORDER — VANCOMYCIN HYDROCHLORIDE 1 G/20ML
INJECTION, POWDER, LYOPHILIZED, FOR SOLUTION INTRAVENOUS PRN
Status: DISCONTINUED | OUTPATIENT
Start: 2023-08-24 | End: 2023-08-24 | Stop reason: ALTCHOICE

## 2023-08-24 RX ORDER — ALBUMIN, HUMAN INJ 5% 5 %
SOLUTION INTRAVENOUS PRN
Status: DISCONTINUED | OUTPATIENT
Start: 2023-08-24 | End: 2023-08-24 | Stop reason: SDUPTHER

## 2023-08-24 RX ORDER — DIPHENHYDRAMINE HYDROCHLORIDE 50 MG/ML
12.5 INJECTION INTRAMUSCULAR; INTRAVENOUS
Status: DISCONTINUED | OUTPATIENT
Start: 2023-08-24 | End: 2023-08-24 | Stop reason: HOSPADM

## 2023-08-24 RX ORDER — MIDAZOLAM HYDROCHLORIDE 2 MG/2ML
2 INJECTION, SOLUTION INTRAMUSCULAR; INTRAVENOUS
Status: DISCONTINUED | OUTPATIENT
Start: 2023-08-24 | End: 2023-08-24 | Stop reason: HOSPADM

## 2023-08-24 RX ADMIN — CYCLOBENZAPRINE 10 MG: 10 TABLET, FILM COATED ORAL at 18:13

## 2023-08-24 RX ADMIN — FAMOTIDINE 20 MG: 20 TABLET, FILM COATED ORAL at 20:45

## 2023-08-24 RX ADMIN — HYDROMORPHONE HYDROCHLORIDE 0.5 MG: 1 INJECTION, SOLUTION INTRAMUSCULAR; INTRAVENOUS; SUBCUTANEOUS at 13:46

## 2023-08-24 RX ADMIN — Medication 40 MCG: at 09:22

## 2023-08-24 RX ADMIN — PHENYLEPHRINE HYDROCHLORIDE 30 MCG/MIN: 10 INJECTION INTRAVENOUS at 09:30

## 2023-08-24 RX ADMIN — Medication 10 MG: at 09:12

## 2023-08-24 RX ADMIN — CEFAZOLIN SODIUM 2000 MG: 1 POWDER, FOR SOLUTION INTRAMUSCULAR; INTRAVENOUS at 08:30

## 2023-08-24 RX ADMIN — ALBUMIN (HUMAN) 12.5 G: 12.5 SOLUTION INTRAVENOUS at 10:30

## 2023-08-24 RX ADMIN — OXYCODONE HYDROCHLORIDE 5 MG: 5 TABLET ORAL at 15:54

## 2023-08-24 RX ADMIN — KETOROLAC TROMETHAMINE 15 MG: 30 INJECTION, SOLUTION INTRAMUSCULAR; INTRAVENOUS at 14:49

## 2023-08-24 RX ADMIN — PROPOFOL 50 MG: 10 INJECTION, EMULSION INTRAVENOUS at 08:08

## 2023-08-24 RX ADMIN — Medication 40 MCG: at 09:28

## 2023-08-24 RX ADMIN — WATER 2000 MG: 1 INJECTION INTRAMUSCULAR; INTRAVENOUS; SUBCUTANEOUS at 20:44

## 2023-08-24 RX ADMIN — FENTANYL CITRATE 50 MCG: 50 INJECTION, SOLUTION INTRAMUSCULAR; INTRAVENOUS at 08:13

## 2023-08-24 RX ADMIN — HYDROMORPHONE HYDROCHLORIDE 0.5 MG: 2 INJECTION, SOLUTION INTRAMUSCULAR; INTRAVENOUS; SUBCUTANEOUS at 12:42

## 2023-08-24 RX ADMIN — FENTANYL CITRATE 25 MCG: 50 INJECTION, SOLUTION INTRAMUSCULAR; INTRAVENOUS at 12:36

## 2023-08-24 RX ADMIN — Medication 80 MG: at 08:08

## 2023-08-24 RX ADMIN — PROPOFOL 100 MG: 10 INJECTION, EMULSION INTRAVENOUS at 08:07

## 2023-08-24 RX ADMIN — MAGNESIUM SULFATE HEPTAHYDRATE 2000 MG: 40 INJECTION, SOLUTION INTRAVENOUS at 08:50

## 2023-08-24 RX ADMIN — FENTANYL CITRATE 50 MCG: 50 INJECTION, SOLUTION INTRAMUSCULAR; INTRAVENOUS at 08:59

## 2023-08-24 RX ADMIN — CEFAZOLIN SODIUM 2000 MG: 1 POWDER, FOR SOLUTION INTRAMUSCULAR; INTRAVENOUS at 12:24

## 2023-08-24 RX ADMIN — GLYCOPYRROLATE 0.2 MG: 0.2 INJECTION INTRAMUSCULAR; INTRAVENOUS at 08:38

## 2023-08-24 RX ADMIN — SODIUM CHLORIDE, POTASSIUM CHLORIDE, SODIUM LACTATE AND CALCIUM CHLORIDE: 600; 310; 30; 20 INJECTION, SOLUTION INTRAVENOUS at 06:52

## 2023-08-24 RX ADMIN — ACETAMINOPHEN 1000 MG: 500 TABLET ORAL at 20:45

## 2023-08-24 RX ADMIN — SODIUM CHLORIDE, POTASSIUM CHLORIDE, SODIUM LACTATE AND CALCIUM CHLORIDE: 600; 310; 30; 20 INJECTION, SOLUTION INTRAVENOUS at 07:59

## 2023-08-24 RX ADMIN — MAGNESIUM HYDROXIDE 15 ML: 400 SUSPENSION ORAL at 20:45

## 2023-08-24 RX ADMIN — SODIUM CHLORIDE: 9 INJECTION, SOLUTION INTRAVENOUS at 15:55

## 2023-08-24 RX ADMIN — SODIUM CHLORIDE: 9 INJECTION, SOLUTION INTRAVENOUS at 08:30

## 2023-08-24 RX ADMIN — SUGAMMADEX 200 MG: 100 INJECTION, SOLUTION INTRAVENOUS at 13:06

## 2023-08-24 RX ADMIN — HYDROMORPHONE HYDROCHLORIDE 0.5 MG: 2 INJECTION, SOLUTION INTRAMUSCULAR; INTRAVENOUS; SUBCUTANEOUS at 13:07

## 2023-08-24 RX ADMIN — ACETAMINOPHEN 1000 MG: 500 TABLET ORAL at 15:53

## 2023-08-24 RX ADMIN — FENTANYL CITRATE 50 MCG: 50 INJECTION, SOLUTION INTRAMUSCULAR; INTRAVENOUS at 07:59

## 2023-08-24 RX ADMIN — ROCURONIUM BROMIDE 10 MG: 50 INJECTION INTRAVENOUS at 09:23

## 2023-08-24 RX ADMIN — Medication 5 MG: at 09:07

## 2023-08-24 RX ADMIN — TAMSULOSIN HYDROCHLORIDE 0.4 MG: 0.4 CAPSULE ORAL at 18:13

## 2023-08-24 RX ADMIN — LIDOCAINE HYDROCHLORIDE 80 MG: 20 INJECTION, SOLUTION EPIDURAL; INFILTRATION; INTRACAUDAL; PERINEURAL at 08:07

## 2023-08-24 RX ADMIN — KETOROLAC TROMETHAMINE 15 MG: 30 INJECTION, SOLUTION INTRAMUSCULAR; INTRAVENOUS at 20:44

## 2023-08-24 RX ADMIN — ROCURONIUM BROMIDE 20 MG: 50 INJECTION INTRAVENOUS at 11:13

## 2023-08-24 RX ADMIN — MAGNESIUM HYDROXIDE 15 ML: 400 SUSPENSION ORAL at 15:53

## 2023-08-24 RX ADMIN — ROCURONIUM BROMIDE 40 MG: 50 INJECTION INTRAVENOUS at 08:50

## 2023-08-24 RX ADMIN — ONDANSETRON 4 MG: 2 INJECTION INTRAMUSCULAR; INTRAVENOUS at 12:24

## 2023-08-24 RX ADMIN — POLYETHYLENE GLYCOL 3350 17 G: 17 POWDER, FOR SOLUTION ORAL at 15:54

## 2023-08-24 RX ADMIN — PROPOFOL 75 MCG/KG/MIN: 10 INJECTION, EMULSION INTRAVENOUS at 08:13

## 2023-08-24 ASSESSMENT — PAIN DESCRIPTION - ORIENTATION
ORIENTATION: LOWER;POSTERIOR
ORIENTATION: LOWER
ORIENTATION: MID;LOWER

## 2023-08-24 ASSESSMENT — PAIN DESCRIPTION - PAIN TYPE: TYPE: SURGICAL PAIN

## 2023-08-24 ASSESSMENT — PAIN DESCRIPTION - DESCRIPTORS
DESCRIPTORS: ACHING
DESCRIPTORS: ACHING;SORE
DESCRIPTORS: ACHING
DESCRIPTORS: SORE
DESCRIPTORS: ACHING

## 2023-08-24 ASSESSMENT — PAIN DESCRIPTION - LOCATION
LOCATION: BACK

## 2023-08-24 ASSESSMENT — PAIN SCALES - GENERAL
PAINLEVEL_OUTOF10: 2
PAINLEVEL_OUTOF10: 9
PAINLEVEL_OUTOF10: 3
PAINLEVEL_OUTOF10: 5
PAINLEVEL_OUTOF10: 7
PAINLEVEL_OUTOF10: 6

## 2023-08-24 ASSESSMENT — PAIN DESCRIPTION - FREQUENCY: FREQUENCY: INTERMITTENT

## 2023-08-24 ASSESSMENT — PAIN DESCRIPTION - ONSET: ONSET: GRADUAL

## 2023-08-24 ASSESSMENT — PAIN - FUNCTIONAL ASSESSMENT: PAIN_FUNCTIONAL_ASSESSMENT: 0-10

## 2023-08-24 NOTE — PERIOP NOTE
TRANSFER - OUT REPORT:    Verbal report given to Juan Ramon  on Matilde Narayan  being transferred to room 418 for routine post-op       Report consisted of patient's Situation, Background, Assessment and   Recommendations(SBAR). Information from the following report(s) Nurse Handoff Report, Surgery Report, Intake/Output, MAR, and Cardiac Rhythm A Flutter  was reviewed with the receiving nurse. Lines:   Peripheral IV 08/24/23 Distal;Posterior;Right Forearm (Active)   Site Assessment Clean, dry & intact 08/24/23 1325   Line Status Infusing 08/24/23 [address] Connections checked and tightened 08/24/23 1325   Phlebitis Assessment No symptoms 08/24/23 1325   Infiltration Assessment 0 08/24/23 1325   Alcohol Cap Used Yes 08/24/23 1325   Dressing Status Clean, dry & intact 08/24/23 1325   Dressing Type Transparent 08/24/23 1325        Opportunity for questions and clarification was provided. Patient transported with:  Registered Nurse      14:40 - Pt is now leaving PACU in stable condition.

## 2023-08-24 NOTE — ANESTHESIA PRE PROCEDURE
BILITOT 1.1 08/16/2023 02:09 PM    ALKPHOS 82 08/16/2023 02:09 PM    ALKPHOS 94 05/02/2022 04:14 PM    AST 23 08/16/2023 02:09 PM    ALT 26 08/16/2023 02:09 PM       POC Tests: No results for input(s): POCGLU, POCNA, POCK, POCCL, POCBUN, POCHEMO, POCHCT in the last 72 hours. Coags:   Lab Results   Component Value Date/Time    PROTIME 11.7 08/16/2023 02:09 PM    INR 1.1 08/16/2023 02:09 PM    APTT 31.9 08/16/2023 02:09 PM       HCG (If Applicable): No results found for: PREGTESTUR, PREGSERUM, HCG, HCGQUANT     ABGs: No results found for: PHART, PO2ART, EGE0ALF, IJC0PTE, BEART, J1MWIYXF     Type & Screen (If Applicable):  No results found for: LABABO, LABRH    Drug/Infectious Status (If Applicable):  Lab Results   Component Value Date/Time    HEPCAB NONREACTIVE 04/17/2020 09:45 AM       COVID-19 Screening (If Applicable): No results found for: COVID19        Anesthesia Evaluation  Patient summary reviewed  Airway: Mallampati: I          Dental:          Pulmonary:Negative Pulmonary ROS and normal exam                               Cardiovascular:  Exercise tolerance: poor (<4 METS),   (+) valvular problems/murmurs:, dysrhythmias: atrial flutter and atrial fibrillation,       ECG reviewed  Rhythm: irregular    Echocardiogram reviewed    Cleared by cardiology           ROS comment: Exercise limited because of back pain  H/O Mitral valvuplasty     Neuro/Psych:   (+) neuromuscular disease:,              ROS comment: Peripheral neuropathy GI/Hepatic/Renal: Neg GI/Hepatic/Renal ROS            Endo/Other: Negative Endo/Other ROS                    Abdominal: normal exam            Vascular: Other Findings:           Anesthesia Plan      general     ASA 3       Induction: intravenous. Anesthetic plan and risks discussed with patient. Use of blood products discussed with patient whom. Plan discussed with CRNA.                     Petr Ko MD   8/24/2023

## 2023-08-24 NOTE — H&P
Date of Surgery Update:  Janae Pinedo was seen and examined. History and physical has been reviewed. The patient has been examined. There have been no significant clinical changes since the completion of the originally dated History and Physical.  Past Medical History:   Diagnosis Date    Atrial fibrillation ([address]) 07/29/2013    Dr. Byron Ivory.  Holter 12/9/13    DDD (degenerative disc disease), lumbar     Diverticulosis     ED (erectile dysfunction) 04/08/2014    Enlarged prostate 2014    psa 1.02 9/2017    Grief reaction     son was murdered 2017    History of mitral valve repair 2013    echo 3/2019 stable. EF 50-55% mildly increased valvue pressure 11/2017    History of obstructive sleep apnea     states resolved after mitral replacement 2013    Hypercholesterolemia 04/08/2014    Hyperplastic colon polyp 2013    Idiopathic peripheral neuropathy     did not tolerate gabapentin, lyrica, cymbalta. tried Nutrix    Long term (current) use of anticoagulants     Plantar fasciitis     Retinal disease     Dr. Jones De La Garza. hx laser tx    Vitamin B12 deficiency     injections     No current facility-administered medications on file prior to encounter. Current Outpatient Medications on File Prior to Encounter   Medication Sig Dispense Refill    cyanocobalamin 1000 MCG/ML injection INJECT 1 ML IN THE MUSCLE EVERY MONTH      Multiple Vitamins-Minerals (MENS 50+ MULTIVITAMIN) TABS Take by mouth daily      Zinc 25 MG TABS Take by mouth daily      ELIQUIS 5 MG TABS tablet Take 1 tablet by mouth 2 times daily 60 tablet 5     Allergies   Allergen Reactions    Beta Adrenergic Blockers Nausea And Vomiting       Patient identified by surgeon; surgical site was confirmed by patient and surgeon.   Patient reports axial pain 2-3, radiating pain left leg 7-8  Numbness left leg and both feet (known neuropathy)  Weakness left leg  Assistive device for ambulation no  Vit D: 36.5  Brace: needs LSO  Preop pain meds:yes  Home meds taken

## 2023-08-24 NOTE — PROGRESS NOTES
Physical Therapy orders acknowledged, chart reviewed and discussed with nurse who was int he room with the patient doing her assessment patient recently just got up. Patient still having a a lot of pain 8/10 pain scale asked to let him rest for now. Family at bedside and agreed to defer therapy for now due to pain. We will continue to follow up with the patient for therapy.

## 2023-08-24 NOTE — BRIEF OP NOTE
Brief Postoperative Note      Patient: Eddie Rolon  YOB: 1945  MRN: [id number]    Date of Procedure: 8/24/2023    Pre-Op Diagnosis Codes:     * Spinal stenosis of lumbar region with radiculopathy [M48.061, M54.16]     * Neural foraminal stenosis of lumbar spine [M48.061]  Large lumbar synovial cyst  Spondylolisthesis L4-5  Post-Op Diagnosis: Same       Procedure(s):  L3-L5 LAMINECTOMY, L4 REMOVAL OF SYNOVIAL CYST, L4-L5 POSTEROLATERAL FUSION, ILIAC CREST BONE MARROW ASPIRATE (O-ARM)    Surgeon(s):  Cathy Ken MD    Assistant:  * No surgical staff found *    Anesthesia: General    Estimated Blood Loss (mL): 396    Complications: None    Specimens:   ID Type Source Tests Collected by Time Destination   A : Synovial cyst L4 Tissue Synovial SURGICAL PATHOLOGY Cathy Ken MD 8/24/2023 1239        Implants:  Implant Name Type Inv.  Item Serial No.  Lot No. LRB No. Used Action   BONE CHIP CANCELLOUS   [id number] [employer] NA N/A 1 Implanted   GRAFT BONE 10 CC - [id number]  GRAFT BONE 10 CC [id number] [employer]-WD NA N/A 1 Implanted   GRAFT BONE 5 CC - [id number]  GRAFT BONE 5 CC [id number] [employer]-WD NA N/A 1 Implanted   SCREW SET MULTAXL STRL CD HORZ MODULEX - SNA  SCREW SET MULTAXL STRL CD HORZ MODULEX NA [employer]WD [id number] N/A 1 Implanted   SCREW SPNL THRD 5.5X45 MM SHANK CD HORZ MODULEX ATS - SNA  SCREW SPNL THRD 5.5X45 MM SHANK CD HORZ MODULEX ATS NA [employer]WD NA N/A 1 Implanted   SCREW SPNL THRD 5.5X40 MM SHANK CD HORZ MODULEX ATS - SNA  SCREW SPNL THRD 5.5X40 MM SHANK CD HORZ MODULEX ATS NA [employer]WD NA N/A 1 Implanted   SCREW SPNL THRD 6.5X40 MM SHANK CD HORZ MODULEX ATS - SNA  SCREW SPNL THRD 6.5X40 MM SHANK CD HORZ MODULEX ATS NA [employer]WD NA N/A 1 Implanted   SCREW SPNL THRD 6.5X45 MM SHANK CD HORZ MODULEX ATS - SNA  SCREW SPNL THRD 6.5X45 MM SHANK CD HORZ MODULEX ATS NA [facility]

## 2023-08-24 NOTE — PLAN OF CARE
Problem: ABCDS Injury Assessment  Goal: Absence of physical injury  Outcome: [facility] 114 Progressing     Problem: Pain  Goal: Verbalizes/displays adequate comfort level or baseline comfort level  Outcome: HH/HSPC Progressing     Problem: Discharge Planning  Goal: Discharge to home or other facility with appropriate resources  Outcome: [address] [place] [address] 114 Progressing

## 2023-08-24 NOTE — ANESTHESIA POSTPROCEDURE EVALUATION
Department of Anesthesiology  Postprocedure Note    Patient: Shiv Rust  MRN: [id number]  YOB: 1945  Date of evaluation: 8/24/2023      Procedure Summary     Date: 08/24/23 Room / Location: [facility] MAIN OR F6 / [facility] MAIN OR    Anesthesia Start: 1084 Anesthesia Stop: 0990    Procedure: L3-L5 LAMINECTOMY, L4 REMOVAL OF SYNOVIAL CYST, L4-L5 POSTEROLATERAL FUSION, ILIAC CREST BONE MARROW ASPIRATE (O-ARM) (Spine Lumbar) Diagnosis:       Spinal stenosis of lumbar region with radiculopathy      Neural foraminal stenosis of lumbar spine      (Spinal stenosis of lumbar region with radiculopathy [M48.061, M54.16])      (Neural foraminal stenosis of lumbar spine [M48.061])    Surgeons: Breanna Pantoja MD Responsible Provider: Bello Colorado MD    Anesthesia Type: General ASA Status: 3          Anesthesia Type: General    Jhonny Phase I: Jhonny Score: 10    Jhonny Phase II:        Anesthesia Post Evaluation    Patient location during evaluation: bedside  Airway patency: patent  Nausea & Vomiting: no nausea and no vomiting  Complications: no  Cardiovascular status: hemodynamically stable  Respiratory status: acceptable  Comments: VITALS REVIEWED

## 2023-08-25 LAB
ANION GAP SERPL CALC-SCNC: 3 MMOL/L (ref 5–15)
BUN SERPL-MCNC: 17 MG/DL (ref 6–20)
BUN/CREAT SERPL: 19 (ref 12–20)
CALCIUM SERPL-MCNC: 8 MG/DL (ref 8.5–10.1)
CHLORIDE SERPL-SCNC: 111 MMOL/L (ref 97–108)
CO2 SERPL-SCNC: 26 MMOL/L (ref 21–32)
CREAT SERPL-MCNC: 0.89 MG/DL (ref 0.7–1.3)
GLUCOSE SERPL-MCNC: 112 MG/DL (ref 65–100)
HCT VFR BLD AUTO: 30.4 % (ref 36.6–50.3)
HGB BLD-MCNC: 9.8 G/DL (ref 12.1–17)
POTASSIUM SERPL-SCNC: 4.2 MMOL/L (ref 3.5–5.1)
SODIUM SERPL-SCNC: 140 MMOL/L (ref 136–145)

## 2023-08-25 PROCEDURE — 2580000003 HC RX 258: Performed by: NURSE PRACTITIONER

## 2023-08-25 PROCEDURE — G0378 HOSPITAL OBSERVATION PER HR: HCPCS

## 2023-08-25 PROCEDURE — 97530 THERAPEUTIC ACTIVITIES: CPT

## 2023-08-25 PROCEDURE — 0SG00K1 FUSION OF LUMBAR VERTEBRAL JOINT WITH NONAUTOLOGOUS TISSUE SUBSTITUTE, POSTERIOR APPROACH, POSTERIOR COLUMN, OPEN APPROACH: ICD-10-PCS | Performed by: ORTHOPAEDIC SURGERY

## 2023-08-25 PROCEDURE — 97165 OT EVAL LOW COMPLEX 30 MIN: CPT

## 2023-08-25 PROCEDURE — 6360000002 HC RX W HCPCS: Performed by: NURSE PRACTITIONER

## 2023-08-25 PROCEDURE — 07DR3ZZ EXTRACTION OF ILIAC BONE MARROW, PERCUTANEOUS APPROACH: ICD-10-PCS | Performed by: ORTHOPAEDIC SURGERY

## 2023-08-25 PROCEDURE — 94761 N-INVAS EAR/PLS OXIMETRY MLT: CPT

## 2023-08-25 PROCEDURE — 85018 HEMOGLOBIN: CPT

## 2023-08-25 PROCEDURE — 80048 BASIC METABOLIC PNL TOTAL CA: CPT

## 2023-08-25 PROCEDURE — L0457 TLSO FLEX TRNK SJ-SS PRE OTS: HCPCS

## 2023-08-25 PROCEDURE — 97161 PT EVAL LOW COMPLEX 20 MIN: CPT

## 2023-08-25 PROCEDURE — 6370000000 HC RX 637 (ALT 250 FOR IP): Performed by: NURSE PRACTITIONER

## 2023-08-25 PROCEDURE — 1100000000 HC RM PRIVATE

## 2023-08-25 PROCEDURE — 36415 COLL VENOUS BLD VENIPUNCTURE: CPT

## 2023-08-25 PROCEDURE — 97116 GAIT TRAINING THERAPY: CPT

## 2023-08-25 PROCEDURE — 85014 HEMATOCRIT: CPT

## 2023-08-25 PROCEDURE — 01NB0ZZ RELEASE LUMBAR NERVE, OPEN APPROACH: ICD-10-PCS | Performed by: ORTHOPAEDIC SURGERY

## 2023-08-25 RX ADMIN — TAMSULOSIN HYDROCHLORIDE 0.4 MG: 0.4 CAPSULE ORAL at 08:40

## 2023-08-25 RX ADMIN — ACETAMINOPHEN 1000 MG: 500 TABLET ORAL at 02:55

## 2023-08-25 RX ADMIN — POLYETHYLENE GLYCOL 3350 17 G: 17 POWDER, FOR SOLUTION ORAL at 08:40

## 2023-08-25 RX ADMIN — KETOROLAC TROMETHAMINE 15 MG: 30 INJECTION, SOLUTION INTRAMUSCULAR; INTRAVENOUS at 02:55

## 2023-08-25 RX ADMIN — KETOROLAC TROMETHAMINE 15 MG: 30 INJECTION, SOLUTION INTRAMUSCULAR; INTRAVENOUS at 08:40

## 2023-08-25 RX ADMIN — SODIUM CHLORIDE, PRESERVATIVE FREE 10 ML: 5 INJECTION INTRAVENOUS at 20:45

## 2023-08-25 RX ADMIN — MAGNESIUM HYDROXIDE 15 ML: 400 SUSPENSION ORAL at 08:40

## 2023-08-25 RX ADMIN — ACETAMINOPHEN 1000 MG: 500 TABLET ORAL at 18:28

## 2023-08-25 RX ADMIN — MAGNESIUM HYDROXIDE 15 ML: 400 SUSPENSION ORAL at 20:43

## 2023-08-25 RX ADMIN — FAMOTIDINE 20 MG: 20 TABLET, FILM COATED ORAL at 08:39

## 2023-08-25 RX ADMIN — SODIUM CHLORIDE, PRESERVATIVE FREE 10 ML: 5 INJECTION INTRAVENOUS at 08:48

## 2023-08-25 RX ADMIN — SODIUM CHLORIDE: 9 INJECTION, SOLUTION INTRAVENOUS at 22:36

## 2023-08-25 RX ADMIN — FAMOTIDINE 20 MG: 20 TABLET, FILM COATED ORAL at 20:43

## 2023-08-25 RX ADMIN — ACETAMINOPHEN 1000 MG: 500 TABLET ORAL at 08:45

## 2023-08-25 RX ADMIN — ACETAMINOPHEN 1000 MG: 500 TABLET ORAL at 23:51

## 2023-08-25 RX ADMIN — WATER 2000 MG: 1 INJECTION INTRAMUSCULAR; INTRAVENOUS; SUBCUTANEOUS at 05:31

## 2023-08-25 ASSESSMENT — PAIN DESCRIPTION - DESCRIPTORS: DESCRIPTORS: DULL;THROBBING

## 2023-08-25 ASSESSMENT — PAIN DESCRIPTION - ORIENTATION: ORIENTATION: LEFT

## 2023-08-25 ASSESSMENT — PAIN DESCRIPTION - LOCATION: LOCATION: RIB CAGE

## 2023-08-25 ASSESSMENT — PAIN SCALES - GENERAL
PAINLEVEL_OUTOF10: 4
PAINLEVEL_OUTOF10: 3

## 2023-08-25 NOTE — PROGRESS NOTES
Doing well  No leg pain  Passing flatus  No nausea  Slept well  Motor intact on exam  Chonric neuropatjy below knees bilateral, actually seems a bit better this am  Received flomax last night  Hemovac minimal  Mobilize today, get LSO  Advance diet  As he had flomax, discussed monitoring BP with getting up this morning

## 2023-08-25 NOTE — PLAN OF CARE
[facility]    PATIENT'S NAME: DENVER SNOWDEN   ATTENDING PHYSICIAN: Wendi Michael DO   CONSULTING PHYSICIAN: Nicola Ellison MD   PATIENT ACCOUNT#:   [id number]    LOCATION:  [facility] [place] [facility] #:   [id number]       DATE OF BIRTH:  [birth year] Problem: ABCDS Injury Assessment  Goal: Absence of physical injury  Outcome: Progressing     Problem: Pain  Goal: Verbalizes/displays adequate comfort level or baseline comfort level  Outcome: Progressing     Problem: Discharge Planning  Goal: Discharge to home or other facility with appropriate resources  Outcome: Progressing     Problem: Safety - Adult  Goal: Free from fall injury  Outcome: Progressing     Problem: Physical Therapy - Adult  Goal: By Discharge: Performs mobility at highest level of function for planned discharge setting. See evaluation for individualized goals. Description: FUNCTIONAL STATUS PRIOR TO ADMISSION: Patient was independent and active without use of DME.    HOME SUPPORT PRIOR TO ADMISSION: The patient lived with his wife but did not require assistance. Physical Therapy Goals  Initiated 8/25/2023  1. Patient will move from supine to sit and sit to supine in bed with modified independence within 4 day(s). 2.  Patient will perform sit to stand with modified independence within 4 day(s). 3.  Patient will transfer from bed to chair and chair to bed with modified independence using the least restrictive device within 4 day(s). 4.  Patient will ambulate with modified independence for 150 feet with the least restrictive device within 4 day(s). 5.  Patient will ascend/descend 6 stairs with 1 handrail(s) with modified independence within 4 day(s). 6. Patient will verbalize and demonstrate understanding of spinal precautions (No bending, lifting greater than 5 lbs, or twisting; log-roll technique; frequent repositioning as instructed) within 4 days. 8/25/2023 1623 by Serafin Perales, PT  Outcome: Progressing  8/25/2023 1005 by Serafin Perales, PT  Outcome: Progressing     Problem: Occupational Therapy - Adult  Goal: By Discharge: Performs self-care activities at highest level of function for planned discharge setting. See evaluation for individualized goals.   Description: name, date of birth, age, year, month, day of the week. VITAL SIGNS:  As recorded in the chart. Temperature 98, pulse 105, respiratory rate 16, blood pressure 109/77, pulse ox 100%. NEUROLOGIC:  Pupils react to light. Visual fields are full.   Cranial n

## 2023-08-25 NOTE — PROGRESS NOTES
NUTRITION    Best practice alert was triggered based on results obtained during nursing admission assessment for Weight loss 14-23#     Wt Readings from Last 30 Encounters:   08/24/23 80.7 kg (177 lb 14.6 oz)   08/16/23 80.7 kg (178 lb)   07/03/23 80.6 kg (177 lb 12.8 oz)   05/12/23 80.9 kg (178 lb 6.4 oz)   11/17/22 81.6 kg (180 lb)   05/02/22 79.5 kg (175 lb 3.2 oz)   11/11/21 78.5 kg (173 lb)   11/11/21 78.5 kg (173 lb)   05/05/21 83.9 kg (185 lb)      Wt stable over the last year +. The patient's chart was reviewed and nutrition assessment is not indicated at this time. Plan to see patient for rescreen per policy. Thank you.      Sandie Mcneil RD  Ext: [phone], or via [employer]

## 2023-08-25 NOTE — PLAN OF CARE
Problem: Physical Therapy - Adult  Goal: By Discharge: Performs mobility at highest level of function for planned discharge setting. See evaluation for individualized goals. Description: FUNCTIONAL STATUS PRIOR TO ADMISSION: Patient was independent and active without use of DME.    HOME SUPPORT PRIOR TO ADMISSION: The patient lived with his wife but did not require assistance. Physical Therapy Goals  Initiated 8/25/2023  1. Patient will move from supine to sit and sit to supine in bed with modified independence within 4 day(s). 2.  Patient will perform sit to stand with modified independence within 4 day(s). 3.  Patient will transfer from bed to chair and chair to bed with modified independence using the least restrictive device within 4 day(s). 4.  Patient will ambulate with modified independence for 150 feet with the least restrictive device within 4 day(s). 5.  Patient will ascend/descend 6 stairs with 1 handrail(s) with modified independence within 4 day(s). 6. Patient will verbalize and demonstrate understanding of spinal precautions (No bending, lifting greater than 5 lbs, or twisting; log-roll technique; frequent repositioning as instructed) within 4 days.     8/25/2023 1623 by Salazar Filter, PT  Outcome: Progressing  8/25/2023 1005 by Salazar Filter, PT  Outcome: Progressing   PHYSICAL THERAPY TREATMENT    Patient: Janet Mejia (77 y.o. 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.38]  Degenerative lumbar spinal stenosis [M48.061] Synovial cyst of lumbar spine  Procedure(s) (LRB):  L3-L5 LAMINECTOMY, L4 REMOVAL OF SYNOVIAL CYST, L4-L5 POSTEROLATERAL FUSION, ILIAC CREST BONE MARROW ASPIRATE (O-ARM) (N/A) 1 Day Post-Op  Precautions:           Spinal Precautions: No Bending, No Lifting, No Twisting (brace when OOB, sitting <30-45 min) cues  Base of Support: Narrowed  Speed/Alexus: Slow  Gait Abnormalities: Decreased step clearance;Shuffling gait  Distance (ft): 75 Feet  Assistive Device: Brace/splint;Gait belt  Neuro Re-Education:                                                                                                                                                                                                                                         Intervention/Education specific to: \"Spinal fusion or laminectomy\"    Educated on and reviewed log roll technique for bed mobility. The patient stated 3/3 spinal precautions when prompted. Reviewed all 3 precautions, encouraged gait as tolerated at discharge, and discussed sitting for approximately 30 minutes before repositioning. Reviewed back brace application and wear schedule. Brace donned with  minimal assistance while seated EOB. Pain Rating:  3/10 surgical site  Pain Intervention(s):   repositioning and pain is at a level acceptable to the patient    Activity Tolerance:   Good    After treatment:   Patient left in no apparent distress sitting up in chair and Call bell within reach      COMMUNICATION/EDUCATION:   The patient's plan of care was discussed with: registered nurse    Patient Education  Education Given To: Patient  Education Provided: Role of Therapy;Transfer Training; Fall Prevention Strategies;Precautions  Education Method: Demonstration;Verbal  Barriers to Learning: None  Education Outcome: Verbalized understanding      Pamela Lucero PT  Minutes: 25

## 2023-08-25 NOTE — PLAN OF CARE
Problem: Physical Therapy - Adult  Goal: By Discharge: Performs mobility at highest level of function for planned discharge setting. See evaluation for individualized goals. Description: FUNCTIONAL STATUS PRIOR TO ADMISSION: Patient was independent and active without use of DME.    HOME SUPPORT PRIOR TO ADMISSION: The patient lived with his wife but did not require assistance. Physical Therapy Goals  Initiated 8/25/2023  1. Patient will move from supine to sit and sit to supine in bed with modified independence within 4 day(s). 2.  Patient will perform sit to stand with modified independence within 4 day(s). 3.  Patient will transfer from bed to chair and chair to bed with modified independence using the least restrictive device within 4 day(s). 4.  Patient will ambulate with modified independence for 150 feet with the least restrictive device within 4 day(s). 5.  Patient will ascend/descend 6 stairs with 1 handrail(s) with modified independence within 4 day(s). 6. Patient will verbalize and demonstrate understanding of spinal precautions (No bending, lifting greater than 5 lbs, or twisting; log-roll technique; frequent repositioning as instructed) within 4 days.     Outcome: Progressing   PHYSICAL THERAPY EVALUATION    Patient: Perla Cooper (69 y.o. male)  Date: 8/25/2023  Primary Diagnosis: Spinal stenosis of lumbar region with radiculopathy [M48.061, M54.16]  Neural foraminal stenosis of lumbar spine [M48.061]  Synovial cyst of lumbar spine [M71.38]  Degenerative lumbar spinal stenosis [M48.061]  Procedure(s) (LRB):  L3-L5 LAMINECTOMY, L4 REMOVAL OF SYNOVIAL CYST, L4-L5 POSTEROLATERAL FUSION, ILIAC CREST BONE MARROW ASPIRATE (O-ARM) (N/A) 1 Day Post-Op   Precautions:           Spinal Precautions: No Bending, No Lifting, No Twisting (brace when OOB, sitting <30-45 min)          ASSESSMENT :   DEFICITS/IMPAIRMENTS:   The patient is limited by decreased functional mobility, sensation, body mechanics, in chair and Call bell within reach    COMMUNICATION/EDUCATION:   The patient's plan of care was discussed with: registered nurse    Patient Education  Education Given To: Patient  Education Provided: Role of Therapy;Transfer Training; Fall Prevention Strategies;Precautions  Education Method: Demonstration;Verbal  Barriers to Learning: None  Education Outcome: Verbalized understanding    Thank you for this referral.  Joeseph Frankel, PT  Minutes: 34      Physical Therapy Evaluation Charge Determination   History Examination Presentation Decision-Making   LOW Complexity : Zero comorbidities / personal factors that will impact the outcome / POC MEDIUM Complexity : 3 Standardized tests and measures addressin body structure, function, activity limitation and / or participation in recreation  LOW Complexity : Stable, uncomplicated  AM-PAC  LOW    Based on the above components, the patient evaluation is determined to be of the following complexity level: Low

## 2023-08-25 NOTE — CARE COORDINATION
08/25/23 1242   Service Assessment   Patient Orientation Alert and Oriented   Cognition Alert   History Provided By Patient   Primary Caregiver Self   Support Systems Spouse/Significant Other   Patient's Healthcare Decision Maker is: Named in Saeid Peres   PCP Verified by CM Yes   Last Visit to PCP Within last 3 months   Prior Functional Level Independent in ADLs/IADLs   Current Functional Level Independent in ADLs/IADLs   Can patient return to prior living arrangement Yes   Ability to make needs known: Good   Family able to assist with home care needs: Yes   Would you like for me to discuss the discharge plan with any other family members/significant others, and if so, who? No   Community Resources None   Social/Functional History   Lives With Spouse   Type of Home House   Home Layout Two level   Home Access Stairs to enter with rails   Entrance Stairs - Number of Steps 3   Entrance Stairs - Rails Left   Bathroom Shower/Tub Walk-in shower   Bathroom Toilet Standard   Bathroom Equipment None   Bathroom Accessibility Accessible   Home Equipment None   Receives Help From Other (comment)  (spouse can hep)   ADL Assistance Independent   Homemaking Assistance Independent   Ambulation Assistance Independent   Transfer Assistance Independent   Active  Yes   Mode of Transportation Car   Occupation Retired   Discharge Planning   Type of Scicasts Prior To Admission None   Potential Assistance Needed Outpatient PT/OT   DME Ordered? No   Potential Assistance Purchasing Medications No   Type of Home Care Services None   Patient expects to be discharged to: House   One/Two Story Residence Two story   # of Interior Steps 15   Interior Rails Right   Lift Chair Available No   History of falls?  0   Services At/After Discharge   Mode of Transport at Discharge Other (see comment)  (spouse will transport)   Confirm Follow Up Transport Family

## 2023-08-26 VITALS
BODY MASS INDEX: 23.58 KG/M2 | HEIGHT: 73 IN | SYSTOLIC BLOOD PRESSURE: 124 MMHG | TEMPERATURE: 99 F | RESPIRATION RATE: 18 BRPM | HEART RATE: 76 BPM | WEIGHT: 177.91 LBS | DIASTOLIC BLOOD PRESSURE: 60 MMHG | OXYGEN SATURATION: 100 %

## 2023-08-26 PROCEDURE — 97116 GAIT TRAINING THERAPY: CPT

## 2023-08-26 PROCEDURE — 97530 THERAPEUTIC ACTIVITIES: CPT

## 2023-08-26 PROCEDURE — 2580000003 HC RX 258: Performed by: NURSE PRACTITIONER

## 2023-08-26 PROCEDURE — 6370000000 HC RX 637 (ALT 250 FOR IP): Performed by: NURSE PRACTITIONER

## 2023-08-26 PROCEDURE — 94761 N-INVAS EAR/PLS OXIMETRY MLT: CPT

## 2023-08-26 RX ORDER — APIXABAN 5 MG/1
5 TABLET, FILM COATED ORAL 2 TIMES DAILY
Qty: 60 TABLET | Refills: 5 | Status: SHIPPED | OUTPATIENT
Start: 2023-08-31

## 2023-08-26 RX ORDER — OXYCODONE HYDROCHLORIDE 5 MG/1
5-10 TABLET ORAL EVERY 6 HOURS PRN
Qty: 56 TABLET | Refills: 0 | Status: SHIPPED | OUTPATIENT
Start: 2023-08-26 | End: 2023-09-02

## 2023-08-26 RX ADMIN — MAGNESIUM HYDROXIDE 15 ML: 400 SUSPENSION ORAL at 08:22

## 2023-08-26 RX ADMIN — SODIUM CHLORIDE, PRESERVATIVE FREE 10 ML: 5 INJECTION INTRAVENOUS at 08:22

## 2023-08-26 RX ADMIN — ACETAMINOPHEN 1000 MG: 500 TABLET ORAL at 05:54

## 2023-08-26 RX ADMIN — ACETAMINOPHEN 1000 MG: 500 TABLET ORAL at 12:19

## 2023-08-26 RX ADMIN — SODIUM CHLORIDE: 9 INJECTION, SOLUTION INTRAVENOUS at 06:18

## 2023-08-26 RX ADMIN — FAMOTIDINE 20 MG: 20 TABLET, FILM COATED ORAL at 08:22

## 2023-08-26 RX ADMIN — POLYETHYLENE GLYCOL 3350 17 G: 17 POWDER, FOR SOLUTION ORAL at 08:21

## 2023-08-26 ASSESSMENT — PAIN DESCRIPTION - DESCRIPTORS: DESCRIPTORS: THROBBING

## 2023-08-26 ASSESSMENT — PAIN DESCRIPTION - LOCATION: LOCATION: BACK

## 2023-08-26 ASSESSMENT — PAIN DESCRIPTION - ORIENTATION: ORIENTATION: LOWER

## 2023-08-26 ASSESSMENT — PAIN SCALES - GENERAL
PAINLEVEL_OUTOF10: 3
PAINLEVEL_OUTOF10: 2

## 2023-08-26 NOTE — PLAN OF CARE
Problem: Physical Therapy - Adult  Goal: By Discharge: Performs mobility at highest level of function for planned discharge setting. See evaluation for individualized goals. Description: FUNCTIONAL STATUS PRIOR TO ADMISSION: Patient was independent and active without use of DME.    HOME SUPPORT PRIOR TO ADMISSION: The patient lived with his wife but did not require assistance. Physical Therapy Goals  Initiated 8/25/2023  1. Patient will move from supine to sit and sit to supine in bed with modified independence within 4 day(s). 2.  Patient will perform sit to stand with modified independence within 4 day(s). 3.    Problem: Physical Therapy - Adult  Goal: By Discharge: Performs mobility at highest level of function for planned discharge setting. See evaluation for individualized goals. Description: FUNCTIONAL STATUS PRIOR TO ADMISSION: Patient was independent and active without use of DME.    HOME SUPPORT PRIOR TO ADMISSION: The patient lived with his wife but did not require assistance. Physical Therapy Goals  Initiated 8/25/2023  1. Patient will move from supine to sit and sit to supine in bed with modified independence within 4 day(s). 2.  Patient will perform sit to stand with modified independence within 4 day(s). 3.  Patient will transfer from bed to chair and chair to bed with modified independence using the least restrictive device within 4 day(s). 4.  Patient will ambulate with modified independence for 150 feet with the least restrictive device within 4 day(s). 5.  Patient will ascend/descend 6 stairs with 1 handrail(s) with modified independence within 4 day(s). 6. Patient will verbalize and demonstrate understanding of spinal precautions (No bending, lifting greater than 5 lbs, or twisting; log-roll technique; frequent repositioning as instructed) within 4 days.     8/26/2023 1309 by Bárbara Burr, PTA  Outcome: Progressing  8/26/2023 0920 by Bárbara Burr, PTA  Outcome: independent  Balance:  Balance  Sitting: Intact  Standing: With support   Ambulation/Gait Training:     Gait  Overall Level of Assistance: Contact-guard assistance  Base of Support: Narrowed  Speed/Alexus: Pace decreased (< 100 feet/min)  Step Length: Right shortened  Gait Abnormalities: Antalgic  Distance (ft): 140 Feet  Assistive Device: Gait belt;Walker, rolling      Activity Tolerance:   Good    After treatment:   Patient left in no apparent distress in bed      COMMUNICATION/EDUCATION:   The patient's plan of care was discussed with: physical therapist           Stevo Harris PTA  Minutes: 21

## 2023-08-26 NOTE — PLAN OF CARE
Problem: Physical Therapy - Adult  Goal: By Discharge: Performs mobility at highest level of function for planned discharge setting. See evaluation for individualized goals. Description: FUNCTIONAL STATUS PRIOR TO ADMISSION: Patient was independent and active without use of DME.    HOME SUPPORT PRIOR TO ADMISSION: The patient lived with his wife but did not require assistance. Physical Therapy Goals  Initiated 8/25/2023  1. Patient will move from supine to sit and sit to supine in bed with modified independence within 4 day(s). 2.  Patient will perform sit to stand with modified independence within 4 day(s). 3.  Patient will transfer from bed to chair and chair to bed with modified independence using the least restrictive device within 4 day(s). 4.  Patient will ambulate with modified independence for 150 feet with the least restrictive device within 4 day(s). 5.  Patient will ascend/descend 6 stairs with 1 handrail(s) with modified independence within 4 day(s). 6. Patient will verbalize and demonstrate understanding of spinal precautions (No bending, lifting greater than 5 lbs, or twisting; log-roll technique; frequent repositioning as instructed) within 4 days. Outcome: Progressing   PHYSICAL THERAPY TREATMENT    Patient: Azam Duarte (89 y.o. male)  Date: 8/26/2023  Diagnosis: Spinal stenosis of lumbar region with radiculopathy [M48.061, M54.16]  Neural foraminal stenosis of lumbar spine [M48.061]  Synovial cyst of lumbar spine [M71.38]  Degenerative lumbar spinal stenosis [M48.061] Synovial cyst of lumbar spine  Procedure(s) (LRB):  L3-L5 LAMINECTOMY, L4 REMOVAL OF SYNOVIAL CYST, L4-L5 POSTEROLATERAL FUSION, ILIAC CREST BONE MARROW ASPIRATE (O-ARM) (N/A) 2 Days Post-Op  Precautions:           Spinal Precautions: No Bending, No Lifting, No Twisting (brace when OOB, sitting <30-45 min)          ASSESSMENT:  Patient continues to benefit from skilled PT services. Pt sit to stand with back brace in

## 2023-08-26 NOTE — DISCHARGE INSTRUCTIONS
Benoit Pillai MD  [address] [place] [address] Phone: [phone]  Lumbar Surgery Discharge Instructions    Activities  You are going home a well person, be as active as possible. Your only exercise should be walking. Start with short frequent walks and increase your walking distance each day. Start with walking three times per day for 5 minutes and increase your distance each day 2-3 minutes. Limit the amount of time you sit to 20-30 minute intervals. Sitting for prolonged periods of time will be uncomfortable for you following your surgery. Do not lift anything over20 pounds for 10-12 weeks, and do not do any bending or straining. When you are in the bed, you may lay on your back or on either side. Do not lay on your stomach. Continue using your incentive spirometer regularly for deep breathing exercises  You may resume sexual relations 6 weeks after your surgery      Diet  You may resume your normal diet. Be sure to drink plenty of fluids, it is important to keep yourself hydrated. MAKE SURE YOU ARE GETTING GOOD NUTRITION (Lean Protein, Vitamin D AND Calcium)  Avoid alcoholic beverages and ABSOLUTELY NO tobacco products. Tobacco products will interfere with your healing. If you continue to use tobacco, you may end up needing another surgery in the future. Medications  Do not take anti-inflammatory medications or aspirin unless instructed by your physician. RESUME ELIQUIS ON 8/31/2023. Take your pain medication as directed. Do NOT take additional Tylenol if your prescribed pain medication has acetaminophen in it (Endocet/Percocet, Lortab, Norco). It is important to have regular bowel movements. Pain medications may cause constipation. Stool softeners, prune juice, and increasing your water and fiber intake may help in preventing constipation. Do NOT take laxatives if at all possible except in severe situations. It can results in a vicious cycle of constipation and diarrhea.    Do not

## 2023-08-26 NOTE — PLAN OF CARE
Went over discharge paperwork with pt and pt's spouse and answered all questions. Pt being discharged with all equipment and belongings. Problem: Physical Therapy - Adult  Goal: By Discharge: Performs mobility at highest level of function for planned discharge setting. See evaluation for individualized goals. Description: FUNCTIONAL STATUS PRIOR TO ADMISSION: Patient was independent and active without use of DME.    HOME SUPPORT PRIOR TO ADMISSION: The patient lived with his wife but did not require assistance. Physical Therapy Goals  Initiated 8/25/2023  1. Patient will move from supine to sit and sit to supine in bed with modified independence within 4 day(s). 2.  Patient will perform sit to stand with modified independence within 4 day(s). 3.  Patient will transfer from bed to chair and chair to bed with modified independence using the least restrictive device within 4 day(s). 4.  Patient will ambulate with modified independence for 150 feet with the least restrictive device within 4 day(s). 5.  Patient will ascend/descend 6 stairs with 1 handrail(s) with modified independence within 4 day(s). 6. Patient will verbalize and demonstrate understanding of spinal precautions (No bending, lifting greater than 5 lbs, or twisting; log-roll technique; frequent repositioning as instructed) within 4 days.     8/26/2023 1309 by Kathleen Zarate PTA  Outcome: Progressing  8/26/2023 0920 by Kathleen Zarate PTA  Outcome: Progressing     Problem: ABCDS Injury Assessment  Goal: Absence of physical injury  Outcome: Adequate for Discharge     Problem: Pain  Goal: Verbalizes/displays adequate comfort level or baseline comfort level  Outcome: Adequate for Discharge     Problem: Discharge Planning  Goal: Discharge to home or other facility with appropriate resources  Outcome: Adequate for Discharge     Problem: Safety - Adult  Goal: Free from fall injury  Outcome: Adequate for Discharge

## 2023-08-26 NOTE — CARE COORDINATION
1792- NCM [facility] Careport referral to [place] for Rolling walker. JW    1145- NCM call to At [address] [place] [address] to confirm PT HH/safety check. Perfect serve to [employer] to confirm but no response. [facility] confirmed intake already completed on patient and is set to visit 8/27/23. Referral to be sent via [address]   MILLER FND [facility]

## 2023-08-26 NOTE — DISCHARGE SUMMARY
1.1 08/16/2023 02:09 PM       Dressing was changed on POD # 2. Incision - clean, dry and intact. No significant erythema or swelling. Neurovascular exam within normal limits. Wound appears to be healing without any evidence of infection. Pt had a HVAC drain the was removed on day 2. Physical Therapy started on the day following surgery and progressed to ambulation with the aid of a rolling walker for distances of **50 feet with {AKASH NFE:90265}. Range of motion  limited by pain. At the time of discharge, able to go up and down stairs and had understanding of precautions needed following surgery. Discharged to: {Discharge Destination:42480::\"Home\"}. Discharge instructions:  -See Full Summary of discharge instructions attached  -Anticoagulate with {anticoagulants:02078}  -Resume pre hospital diet            -Resume home medications      Medication List        START taking these medications      oxyCODONE 5 MG immediate release tablet  Commonly known as: ROXICODONE  Take 1-2 tablets by mouth every 6 hours as needed for Pain for up to 7 days. Max Daily Amount: 40 mg            CHANGE how you take these medications      Eliquis 5 MG Tabs tablet  Generic drug: apixaban  Take 1 tablet by mouth 2 times daily  Start taking on: August 31, 2023  What changed: These instructions start on August 31, 2023. If you are unsure what to do until then, ask your doctor or other care provider.             CONTINUE taking these medications      cyanocobalamin 1000 MCG/ML injection     Mens 50+ Multivitamin Tabs     Zinc 25 MG Tabs            STOP taking these medications      mupirocin 2 % ointment  Commonly known as: Cristian Prader your doctor about these medications      traMADol 50 MG tablet  Commonly known as: ULTRAM               Where to Get Your Medications        These medications were sent to [address] [facility], [address] [place] [address] - F [phone]  [address] [place] [address],[address] ASK your doctor about these medications      traMADol 50 MG tablet  Commonly known as: ULTRAM               Where to Get Your Medications        These medications were sent to [address] [facility], [address] [facility]   [address], [address] [place] [id number]      Phone: [phone]   Eliquis 5 MG Tabs tablet  oxyCODONE 5 MG immediate release tablet      per medical continuation form  -Discharge activity: activity as tolerated, ambulate in house, no driving for 3 weeks, and no driving while on analgesics  -Ambulate with Walkers, Type: Rolling Walker,   -Wound Care Keep wound clean and dry, Reinforce dressing PRN, Ice to area for comfort and As directed  -Follow up in office in 2 weeks      Signed:  AMBERLY Nam NP  8/26/2023  7:54 AM        Venkata Castillo MD

## 2023-10-28 DIAGNOSIS — M54.6 ACUTE MIDLINE THORACIC BACK PAIN: ICD-10-CM

## 2023-10-29 RX ORDER — MELOXICAM 15 MG/1
TABLET ORAL
Qty: 30 TABLET | Refills: 2 | Status: SHIPPED | OUTPATIENT
Start: 2023-10-29 | End: 2023-12-16 | Stop reason: ALTCHOICE

## 2023-10-30 ENCOUNTER — TELEPHONE (OUTPATIENT)
Age: 78
End: 2023-10-30

## 2023-11-01 NOTE — TELEPHONE ENCOUNTER
Let  Frederick Stacie know we are out at this time but to call me in a couple days to check. He was appreciative.

## 2023-11-02 ENCOUNTER — OFFICE VISIT (OUTPATIENT)
Age: 78
End: 2023-11-02
Attending: INTERNAL MEDICINE
Payer: MEDICARE

## 2023-11-02 ENCOUNTER — HOSPITAL ENCOUNTER (OUTPATIENT)
Facility: HOSPITAL | Age: 78
Discharge: HOME | End: 2023-11-02
Attending: INTERNAL MEDICINE
Payer: MEDICARE

## 2023-11-02 VITALS
TEMPERATURE: 97.5 F | RESPIRATION RATE: 18 BRPM | BODY MASS INDEX: 24.73 KG/M2 | WEIGHT: 186.6 LBS | DIASTOLIC BLOOD PRESSURE: 70 MMHG | HEART RATE: 61 BPM | SYSTOLIC BLOOD PRESSURE: 132 MMHG | OXYGEN SATURATION: 97 % | HEIGHT: 73 IN

## 2023-11-02 DIAGNOSIS — M48.061 DEGENERATIVE LUMBAR SPINAL STENOSIS: ICD-10-CM

## 2023-11-02 DIAGNOSIS — M54.2 ACUTE NECK PAIN: ICD-10-CM

## 2023-11-02 DIAGNOSIS — M54.2 ACUTE NECK PAIN: Primary | ICD-10-CM

## 2023-11-02 DIAGNOSIS — G60.9 IDIOPATHIC PERIPHERAL NEUROPATHY: ICD-10-CM

## 2023-11-02 PROCEDURE — 72050 X-RAY EXAM NECK SPINE 4/5VWS: CPT

## 2023-11-02 PROCEDURE — G8484 FLU IMMUNIZE NO ADMIN: HCPCS | Performed by: INTERNAL MEDICINE

## 2023-11-02 PROCEDURE — 99213 OFFICE O/P EST LOW 20 MIN: CPT | Performed by: INTERNAL MEDICINE

## 2023-11-02 PROCEDURE — 1123F ACP DISCUSS/DSCN MKR DOCD: CPT | Performed by: INTERNAL MEDICINE

## 2023-11-02 PROCEDURE — 1036F TOBACCO NON-USER: CPT | Performed by: INTERNAL MEDICINE

## 2023-11-02 PROCEDURE — G8420 CALC BMI NORM PARAMETERS: HCPCS | Performed by: INTERNAL MEDICINE

## 2023-11-02 PROCEDURE — G8427 DOCREV CUR MEDS BY ELIG CLIN: HCPCS | Performed by: INTERNAL MEDICINE

## 2023-11-02 RX ORDER — TIZANIDINE 4 MG/1
4 TABLET ORAL EVERY 6 HOURS PRN
Qty: 28 TABLET | Refills: 0 | Status: SHIPPED | OUTPATIENT
Start: 2023-11-02

## 2023-11-02 RX ORDER — GABAPENTIN 600 MG/1
600 TABLET ORAL NIGHTLY
Qty: 30 TABLET | Refills: 2
Start: 2023-11-02 | End: 2024-01-31

## 2023-11-02 RX ORDER — PREDNISONE 20 MG/1
TABLET ORAL
Qty: 18 TABLET | Refills: 0 | Status: SHIPPED | OUTPATIENT
Start: 2023-11-02

## 2023-11-02 NOTE — PROGRESS NOTES
HISTORY OF PRESENT ILLNESS    Chief Complaint   Patient presents with    Neck Pain     1 wk - no injury     Medication Check     Presents with upper posterior neck pain for 7 days. Radiates to posterior head and bilateral trapezius region. .  Looking down helps. Hx of same, usually relieved w meloxicam x2. This time, meloxicam did not help. Takes eliquis and is aware he should not take nsaid and eliquis often  Associated symptoms include: no weakness or tingling of upper extremity  Taking gabapentin 600 mg hs again. History of severe lumbar disc disease, status post surgeries, most recently August 2023. Has not had any recent work-up of the cervical spine. Review of Systems   All other systems reviewed and are negative, except as noted in HPI    Past Medical and Surgical History   has a past medical history of Atrial fibrillation ([address]), DDD (degenerative disc disease), lumbar, Diverticulosis, ED (erectile dysfunction), Enlarged prostate, Grief reaction, History of mitral valve repair, History of obstructive sleep apnea, Hypercholesterolemia, Hyperplastic colon polyp, Idiopathic peripheral neuropathy, Long term (current) use of anticoagulants, Plantar fasciitis, Retinal disease, and Vitamin B12 deficiency. has a past surgical history that includes Cardiac catheterization (03/05/2013); Colonoscopy (04/30/2018); lumbar fusion (1984); Colonoscopy (04/03/2013); Mitral valvuloplasty (2013); shoulder surgery (Right, 2009); and Lumbar spine surgery (N/A, 8/24/2023). Current Outpatient Medications   Medication Sig    meloxicam (MOBIC) 15 MG tablet TAKE 1 TABLET BY MOUTH DAILY AS NEEDED FOR PAIN.  TAKE RARELY SINCE TAKING ELIQUIS    ELIQUIS 5 MG TABS tablet Take 1 tablet by mouth 2 times daily    cyanocobalamin 1000 MCG/ML injection INJECT 1 ML IN THE MUSCLE EVERY MONTH    Multiple Vitamins-Minerals (MENS 50+ MULTIVITAMIN) TABS Take by mouth daily    Zinc 25 MG TABS Take by mouth daily     No current

## 2023-11-03 ENCOUNTER — TELEPHONE (OUTPATIENT)
Age: 78
End: 2023-11-03

## 2023-11-03 NOTE — TELEPHONE ENCOUNTER
Pt is calling to see if we have received any Eliquis samples that he can come into .      [phone]

## 2023-11-03 NOTE — TELEPHONE ENCOUNTER
Let Mr. Mazariegos Him know he can come at his convenience. He was appreciative.      Future Appointments   Date Time Provider [address]   11/9/2023 11:00 AM [facility] ECHO 1 SOLANGE RENE   11/16/2023 10:20 AM Emilio Harris MD [facility] AMB

## 2023-11-08 PROBLEM — M47.812 CERVICAL SPONDYLOSIS: Status: ACTIVE | Noted: 2023-11-01

## 2023-11-09 ENCOUNTER — ANCILLARY PROCEDURE (OUTPATIENT)
Age: 78
End: 2023-11-09
Payer: MEDICARE

## 2023-11-09 VITALS
SYSTOLIC BLOOD PRESSURE: 132 MMHG | WEIGHT: 186 LBS | DIASTOLIC BLOOD PRESSURE: 70 MMHG | HEIGHT: 73 IN | BODY MASS INDEX: 24.65 KG/M2

## 2023-11-09 DIAGNOSIS — I48.20 ATRIAL FIBRILLATION, CHRONIC (HCC): ICD-10-CM

## 2023-11-09 PROCEDURE — 93306 TTE W/DOPPLER COMPLETE: CPT | Performed by: SPECIALIST

## 2023-11-16 ENCOUNTER — OFFICE VISIT (OUTPATIENT)
Age: 78
End: 2023-11-16
Payer: COMMERCIAL

## 2023-11-16 VITALS
DIASTOLIC BLOOD PRESSURE: 60 MMHG | HEIGHT: 73 IN | BODY MASS INDEX: 23.99 KG/M2 | SYSTOLIC BLOOD PRESSURE: 100 MMHG | HEART RATE: 68 BPM | WEIGHT: 181 LBS | RESPIRATION RATE: 16 BRPM | OXYGEN SATURATION: 99 %

## 2023-11-16 DIAGNOSIS — I48.20 ATRIAL FIBRILLATION, CHRONIC (HCC): Primary | ICD-10-CM

## 2023-11-16 DIAGNOSIS — I42.8 VALVULAR CARDIOMYOPATHY (HCC): ICD-10-CM

## 2023-11-16 DIAGNOSIS — I34.1 NONRHEUMATIC MITRAL (VALVE) PROLAPSE: ICD-10-CM

## 2023-11-16 DIAGNOSIS — E78.00 HYPERCHOLESTEREMIA: ICD-10-CM

## 2023-11-16 DIAGNOSIS — I34.0 NONRHEUMATIC MITRAL (VALVE) INSUFFICIENCY: ICD-10-CM

## 2023-11-16 DIAGNOSIS — I48.20 ATRIAL FIBRILLATION, CHRONIC (HCC): ICD-10-CM

## 2023-11-16 LAB
ANION GAP SERPL CALC-SCNC: 6 MMOL/L (ref 5–15)
BUN SERPL-MCNC: 23 MG/DL (ref 6–20)
BUN/CREAT SERPL: 24 (ref 12–20)
CALCIUM SERPL-MCNC: 9.2 MG/DL (ref 8.5–10.1)
CHLORIDE SERPL-SCNC: 103 MMOL/L (ref 97–108)
CO2 SERPL-SCNC: 28 MMOL/L (ref 21–32)
CREAT SERPL-MCNC: 0.94 MG/DL (ref 0.7–1.3)
ERYTHROCYTE [DISTWIDTH] IN BLOOD BY AUTOMATED COUNT: 14.3 % (ref 11.5–14.5)
GLUCOSE SERPL-MCNC: 110 MG/DL (ref 65–100)
HCT VFR BLD AUTO: 42.7 % (ref 36.6–50.3)
HGB BLD-MCNC: 13.4 G/DL (ref 12.1–17)
MCH RBC QN AUTO: 31.2 PG (ref 26–34)
MCHC RBC AUTO-ENTMCNC: 31.4 G/DL (ref 30–36.5)
MCV RBC AUTO: 99.3 FL (ref 80–99)
NRBC # BLD: 0 K/UL (ref 0–0.01)
NRBC BLD-RTO: 0 PER 100 WBC
PLATELET # BLD AUTO: 168 K/UL (ref 150–400)
PMV BLD AUTO: 12.5 FL (ref 8.9–12.9)
POTASSIUM SERPL-SCNC: 4.4 MMOL/L (ref 3.5–5.1)
RBC # BLD AUTO: 4.3 M/UL (ref 4.1–5.7)
SODIUM SERPL-SCNC: 137 MMOL/L (ref 136–145)
WBC # BLD AUTO: 6 K/UL (ref 4.1–11.1)

## 2023-11-16 PROCEDURE — G8427 DOCREV CUR MEDS BY ELIG CLIN: HCPCS | Performed by: SPECIALIST

## 2023-11-16 PROCEDURE — 93000 ELECTROCARDIOGRAM COMPLETE: CPT | Performed by: SPECIALIST

## 2023-11-16 PROCEDURE — 1036F TOBACCO NON-USER: CPT | Performed by: SPECIALIST

## 2023-11-16 PROCEDURE — G8484 FLU IMMUNIZE NO ADMIN: HCPCS | Performed by: SPECIALIST

## 2023-11-16 PROCEDURE — 1123F ACP DISCUSS/DSCN MKR DOCD: CPT | Performed by: SPECIALIST

## 2023-11-16 PROCEDURE — 99214 OFFICE O/P EST MOD 30 MIN: CPT | Performed by: SPECIALIST

## 2023-11-16 PROCEDURE — G8420 CALC BMI NORM PARAMETERS: HCPCS | Performed by: SPECIALIST

## 2023-11-16 ASSESSMENT — PATIENT HEALTH QUESTIONNAIRE - PHQ9
SUM OF ALL RESPONSES TO PHQ QUESTIONS 1-9: 0
1. LITTLE INTEREST OR PLEASURE IN DOING THINGS: 0
2. FEELING DOWN, DEPRESSED OR HOPELESS: 0
SUM OF ALL RESPONSES TO PHQ QUESTIONS 1-9: 0
SUM OF ALL RESPONSES TO PHQ9 QUESTIONS 1 & 2: 0
SUM OF ALL RESPONSES TO PHQ QUESTIONS 1-9: 0
SUM OF ALL RESPONSES TO PHQ QUESTIONS 1-9: 0

## 2023-11-16 NOTE — PROGRESS NOTES
patient does not have an active medication from one of the medication groupers. Lab Results   Component Value Date    LDLCALC 90.2 05/12/2023         5. Valvular cardiomyopathy (HCC)  EF restored to normal, no CHF   EF reduced due to MR/MVP now normal after surgery. 6. S/P MVR (mitral valve repair)  04/15/13        Impression:   1. Atrial fibrillation, chronic ([address])    2. Nonrheumatic mitral (valve) insufficiency    3. Nonrheumatic mitral (valve) prolapse    4. Hypercholesteremia    5. Valvular cardiomyopathy [facility]        Cardiac History:   No specialty comments available. Future Appointments   Date Time Provider [address]   11/11/2024 10:00 AM BS BABB ECHO 1 SOLANGE RODRIGUEZ AMB   11/11/2024 10:40 AM MD SOLANGE Nash  AMB      Patient Care Team:  Francine Kunz MD as PCP - General  [facility], Mayra Vargas MD as PCP - Empaneled Provider  Leodan Bardales MD as Consulting Physician (Cardiology)   ROS-except as noted above. . A complete cardiac and respiratory are reviewed and negative except as above ; Resp-denies wheezing  or productive cough,. Const- No unusual weight loss or fever; Neuro-no recent seizure or CVA ; GI- No BRBPR, abdom pain, bloating ; - no  hematuria   see supplement sheet, initialed and to be scanned by staff    Past Medical History:   Diagnosis Date    Atrial fibrillation ([address]) 07/29/2013    Dr. Domingo Decker.  Holter 12/9/13    Cervical spondylosis 11/2023    DDD (degenerative disc disease), lumbar     Dr. Elisa Hunter. fusion 1984, laminectomy 2023    Diverticulosis     ED (erectile dysfunction) 04/08/2014    Enlarged prostate 2014    psa 1.02 9/2017    Grief reaction     son was murdered 2017    History of mitral valve repair 2013    echo 3/2019 stable.   EF 50-55% mildly increased valvue pressure 11/2017    History of obstructive sleep apnea     states resolved after mitral replacement 2013    Hypercholesterolemia 04/08/2014    Hyperplastic colon polyp 2013    Idiopathic peripheral

## 2023-11-18 LAB
ECHO AO ASC DIAM: 4 CM
ECHO AO ASCENDING AORTA INDEX: 1.91 CM/M2
ECHO AO ROOT DIAM: 3.8 CM
ECHO AO ROOT INDEX: 1.82 CM/M2
ECHO AV AREA PEAK VELOCITY: 3.8 CM2
ECHO AV AREA VTI: 5 CM2
ECHO AV AREA/BSA PEAK VELOCITY: 1.8 CM2/M2
ECHO AV AREA/BSA VTI: 2.4 CM2/M2
ECHO AV MEAN GRADIENT: 3 MMHG
ECHO AV MEAN VELOCITY: 0.8 M/S
ECHO AV PEAK GRADIENT: 5 MMHG
ECHO AV PEAK VELOCITY: 1.1 M/S
ECHO AV VELOCITY RATIO: 0.82
ECHO AV VTI: 19.5 CM
ECHO BSA: 2.08 M2
ECHO EST RA PRESSURE: 3 MMHG
ECHO LA DIAMETER INDEX: 3.25 CM/M2
ECHO LA DIAMETER: 6.8 CM
ECHO LA TO AORTIC ROOT RATIO: 1.79
ECHO LA VOL A-L A2C: 287 ML (ref 18–58)
ECHO LA VOL A-L A4C: 235 ML (ref 18–58)
ECHO LA VOL BP: 285 ML (ref 18–58)
ECHO LA VOL MOD A2C: 274 ML (ref 18–58)
ECHO LA VOL MOD A4C: 225 ML (ref 18–58)
ECHO LA VOL/BSA BIPLANE: 136 ML/M2 (ref 16–34)
ECHO LA VOLUME AREA LENGTH: 298 ML
ECHO LA VOLUME INDEX A-L A2C: 137 ML/M2 (ref 16–34)
ECHO LA VOLUME INDEX A-L A4C: 112 ML/M2 (ref 16–34)
ECHO LA VOLUME INDEX AREA LENGTH: 143 ML/M2 (ref 16–34)
ECHO LA VOLUME INDEX MOD A2C: 131 ML/M2 (ref 16–34)
ECHO LA VOLUME INDEX MOD A4C: 108 ML/M2 (ref 16–34)
ECHO LV E' LATERAL VELOCITY: 9 CM/S
ECHO LV E' SEPTAL VELOCITY: 9 CM/S
ECHO LV EJECTION FRACTION A2C: 49 %
ECHO LV EJECTION FRACTION A4C: 37 %
ECHO LV FRACTIONAL SHORTENING: 18 % (ref 28–44)
ECHO LV INTERNAL DIMENSION DIASTOLE INDEX: 2.39 CM/M2
ECHO LV INTERNAL DIMENSION DIASTOLIC: 5 CM (ref 4.2–5.9)
ECHO LV INTERNAL DIMENSION SYSTOLIC INDEX: 1.96 CM/M2
ECHO LV INTERNAL DIMENSION SYSTOLIC: 4.1 CM
ECHO LV IVSD: 1.3 CM (ref 0.6–1)
ECHO LV MASS 2D: 261.8 G (ref 88–224)
ECHO LV MASS INDEX 2D: 125.3 G/M2 (ref 49–115)
ECHO LV POSTERIOR WALL DIASTOLIC: 1.3 CM (ref 0.6–1)
ECHO LV RELATIVE WALL THICKNESS RATIO: 0.52
ECHO LVOT AREA: 4.9 CM2
ECHO LVOT AV VTI INDEX: 1.01
ECHO LVOT DIAM: 2.5 CM
ECHO LVOT MEAN GRADIENT: 2 MMHG
ECHO LVOT PEAK GRADIENT: 3 MMHG
ECHO LVOT PEAK VELOCITY: 0.9 M/S
ECHO LVOT STROKE VOLUME INDEX: 46.2 ML/M2
ECHO LVOT SV: 96.7 ML
ECHO LVOT VTI: 19.7 CM
ECHO MV A VELOCITY: 0.62 M/S
ECHO MV AREA VTI: 3 CM2
ECHO MV E DECELERATION TIME (DT): 163.8 MS
ECHO MV E VELOCITY: 1.43 M/S
ECHO MV E/A RATIO: 2.31
ECHO MV E/E' LATERAL: 15.89
ECHO MV E/E' RATIO (AVERAGED): 15.89
ECHO MV LVOT VTI INDEX: 1.64
ECHO MV MAX VELOCITY: 1.3 M/S
ECHO MV MEAN GRADIENT: 3 MMHG
ECHO MV MEAN VELOCITY: 0.8 M/S
ECHO MV PEAK GRADIENT: 7 MMHG
ECHO MV REGURGITANT ALIASING (NYQUIST) VELOCITY: 24 CM/S
ECHO MV REGURGITANT PEAK GRADIENT: 121 MMHG
ECHO MV REGURGITANT PEAK VELOCITY: 5.5 M/S
ECHO MV REGURGITANT VTIA: 175.3 CM
ECHO MV VTI: 32.4 CM
ECHO RIGHT VENTRICULAR SYSTOLIC PRESSURE (RVSP): 36 MMHG
ECHO RV BASAL DIMENSION: 4.5 CM
ECHO RV FREE WALL PEAK S': 12 CM/S
ECHO RV TAPSE: 1.9 CM (ref 1.7–?)
ECHO TV REGURGITANT MAX VELOCITY: 2.88 M/S
ECHO TV REGURGITANT PEAK GRADIENT: 33 MMHG

## 2023-12-16 ENCOUNTER — OFFICE VISIT (OUTPATIENT)
Age: 78
End: 2023-12-16

## 2023-12-16 VITALS
SYSTOLIC BLOOD PRESSURE: 128 MMHG | DIASTOLIC BLOOD PRESSURE: 68 MMHG | RESPIRATION RATE: 18 BRPM | HEART RATE: 75 BPM | BODY MASS INDEX: 24.68 KG/M2 | OXYGEN SATURATION: 94 % | HEIGHT: 73 IN | TEMPERATURE: 98.6 F | WEIGHT: 186.2 LBS

## 2023-12-16 DIAGNOSIS — J40 BRONCHITIS: Primary | ICD-10-CM

## 2023-12-16 DIAGNOSIS — J04.0 LARYNGITIS: ICD-10-CM

## 2023-12-16 RX ORDER — BENZONATATE 200 MG/1
200 CAPSULE ORAL 2 TIMES DAILY PRN
Qty: 10 CAPSULE | Refills: 0 | Status: SHIPPED | OUTPATIENT
Start: 2023-12-16 | End: 2023-12-16 | Stop reason: CLARIF

## 2023-12-16 RX ORDER — BENZONATATE 200 MG/1
200 CAPSULE ORAL 2 TIMES DAILY PRN
Qty: 10 CAPSULE | Refills: 0 | Status: SHIPPED | OUTPATIENT
Start: 2023-12-16 | End: 2023-12-21

## 2023-12-16 RX ORDER — DEXTROMETHORPHAN HYDROBROMIDE AND PROMETHAZINE HYDROCHLORIDE 15; 6.25 MG/5ML; MG/5ML
5 SYRUP ORAL 4 TIMES DAILY PRN
Qty: 100 ML | Refills: 0 | Status: SHIPPED | OUTPATIENT
Start: 2023-12-16 | End: 2023-12-21

## 2023-12-16 RX ORDER — DEXTROMETHORPHAN HYDROBROMIDE AND PROMETHAZINE HYDROCHLORIDE 15; 6.25 MG/5ML; MG/5ML
5 SYRUP ORAL 4 TIMES DAILY PRN
Qty: 100 ML | Refills: 0 | Status: SHIPPED | OUTPATIENT
Start: 2023-12-16 | End: 2023-12-16

## 2023-12-16 NOTE — PROGRESS NOTES
Subjective:       Cassidy Arrieta is a 69 y.o. male here for evaluation of a cough. The cough is non-productive, with shortness of breath, barking and is aggravated by reclining position. Onset of symptoms was 2 days ago, unchanged since that time. Associated symptoms include change in voice. Patient does not have a history of asthma. Patient has not had recent travel. Patient does not have a history of smoking. Patient  has not had a previous chest x-ray. Patient has not had a PPD done. Patient's medications, allergies, past medical, surgical, social and family histories were reviewed and updated as appropriate. Review of Systems  Pertinent items are noted in HPI. Objective:      Oxygen saturation 94%% on room air  General appearance: alert, appears stated age, and cooperative  Ears: normal TM's and external ear canals both ears  Nose: no discharge, turbinates normal, no sinus tenderness  Throat: normal findings: pink and moist  Lungs: clear to auscultation bilaterally  Heart: S1, S2 normal  Lymph nodes: Cervical adenopathy: tenderness with no swelling      Assessment:      Acute Bronchitis and Laryngitis, Lungs clear, hoarseness noted, cough present. VSS, afebrile, SPO2 94% on room air. Plan:      Explained lack of efficacy of antibiotics in viral disease. Antitussives per medication orders. Avoid exposure to tobacco smoke and fumes. Call if shortness of breath worsens, blood in sputum, change in character of cough, development of fever or chills, inability to maintain nutrition and hydration. Avoid exposure to tobacco smoke and fumes.     -benzonatate (TESSALON) 200 MG capsule, Take 1 capsule by mouth 2 times daily as needed for Cough, Disp-10 capsule, R-0 Normal   -promethazine-dextromethorphan (PROMETHAZINE-DM)  6.25-15 MG/5ML syrup, Take 5 mLs by mouth 4 times daily as needed for Cough, Disp-100 mL, R-0 Normal       1. Handout given with care instructions. 2. OTC for symptom management.

## 2023-12-16 NOTE — PATIENT INSTRUCTIONS
If symptoms worsens or fail to improve follow-up with PCP or ER. Drink plenty of fluids and take medications as prescribed. Take over-the-counter Chloraseptic spray and Cepacol lozenges for laryngitis. Take over-the-counter Tylenol for headache. Gargle with warm salt water.

## 2024-01-29 DIAGNOSIS — I48.19 PERSISTENT ATRIAL FIBRILLATION (HCC): ICD-10-CM

## 2024-01-29 RX ORDER — APIXABAN 5 MG/1
TABLET, FILM COATED ORAL
Qty: 180 TABLET | Refills: 1 | Status: SHIPPED | OUTPATIENT
Start: 2024-01-29

## 2024-02-26 RX ORDER — CYANOCOBALAMIN 1000 UG/ML
INJECTION, SOLUTION INTRAMUSCULAR; SUBCUTANEOUS
Qty: 10 ML | Refills: 1 | Status: SHIPPED | OUTPATIENT
Start: 2024-02-26

## 2024-02-26 RX ORDER — CYANOCOBALAMIN 1000 UG/ML
INJECTION, SOLUTION INTRAMUSCULAR; SUBCUTANEOUS
Qty: 10 ML | OUTPATIENT
Start: 2024-02-26

## 2024-07-26 DIAGNOSIS — I48.19 PERSISTENT ATRIAL FIBRILLATION (HCC): ICD-10-CM

## 2024-07-26 RX ORDER — APIXABAN 5 MG/1
TABLET, FILM COATED ORAL
Qty: 180 TABLET | Refills: 1 | Status: SHIPPED | OUTPATIENT
Start: 2024-07-26

## 2024-07-26 NOTE — TELEPHONE ENCOUNTER
Spoke with patient and advised he is overdue for his AWV. He states understanding and scheduled for 8/8/24 at 1:00 PM. Grateful for the call.

## 2024-08-07 SDOH — ECONOMIC STABILITY: FOOD INSECURITY: WITHIN THE PAST 12 MONTHS, THE FOOD YOU BOUGHT JUST DIDN'T LAST AND YOU DIDN'T HAVE MONEY TO GET MORE.: NEVER TRUE

## 2024-08-07 SDOH — ECONOMIC STABILITY: INCOME INSECURITY: HOW HARD IS IT FOR YOU TO PAY FOR THE VERY BASICS LIKE FOOD, HOUSING, MEDICAL CARE, AND HEATING?: NOT HARD AT ALL

## 2024-08-07 SDOH — HEALTH STABILITY: PHYSICAL HEALTH: ON AVERAGE, HOW MANY DAYS PER WEEK DO YOU ENGAGE IN MODERATE TO STRENUOUS EXERCISE (LIKE A BRISK WALK)?: 7 DAYS

## 2024-08-07 SDOH — HEALTH STABILITY: PHYSICAL HEALTH: ON AVERAGE, HOW MANY MINUTES DO YOU ENGAGE IN EXERCISE AT THIS LEVEL?: 50 MIN

## 2024-08-07 SDOH — ECONOMIC STABILITY: FOOD INSECURITY: WITHIN THE PAST 12 MONTHS, YOU WORRIED THAT YOUR FOOD WOULD RUN OUT BEFORE YOU GOT MONEY TO BUY MORE.: NEVER TRUE

## 2024-08-07 ASSESSMENT — LIFESTYLE VARIABLES
HOW MANY STANDARD DRINKS CONTAINING ALCOHOL DO YOU HAVE ON A TYPICAL DAY: 1 OR 2
HOW MANY STANDARD DRINKS CONTAINING ALCOHOL DO YOU HAVE ON A TYPICAL DAY: 1
HOW OFTEN DO YOU HAVE SIX OR MORE DRINKS ON ONE OCCASION: 1

## 2024-08-07 ASSESSMENT — PATIENT HEALTH QUESTIONNAIRE - PHQ9
2. FEELING DOWN, DEPRESSED OR HOPELESS: NOT AT ALL
SUM OF ALL RESPONSES TO PHQ QUESTIONS 1-9: 0
SUM OF ALL RESPONSES TO PHQ9 QUESTIONS 1 & 2: 0
SUM OF ALL RESPONSES TO PHQ QUESTIONS 1-9: 0
1. LITTLE INTEREST OR PLEASURE IN DOING THINGS: NOT AT ALL

## 2024-08-08 ENCOUNTER — OFFICE VISIT (OUTPATIENT)
Age: 79
End: 2024-08-08
Payer: MEDICARE

## 2024-08-08 VITALS
WEIGHT: 181 LBS | HEIGHT: 73 IN | SYSTOLIC BLOOD PRESSURE: 122 MMHG | OXYGEN SATURATION: 97 % | HEART RATE: 62 BPM | DIASTOLIC BLOOD PRESSURE: 80 MMHG | TEMPERATURE: 98.6 F | BODY MASS INDEX: 23.99 KG/M2

## 2024-08-08 DIAGNOSIS — Z86.010 HISTORY OF COLON POLYPS: ICD-10-CM

## 2024-08-08 DIAGNOSIS — Z00.00 MEDICARE ANNUAL WELLNESS VISIT, SUBSEQUENT: Primary | ICD-10-CM

## 2024-08-08 DIAGNOSIS — E78.00 HYPERCHOLESTEROLEMIA: ICD-10-CM

## 2024-08-08 DIAGNOSIS — Z12.5 SCREENING FOR PROSTATE CANCER: ICD-10-CM

## 2024-08-08 DIAGNOSIS — D69.6 THROMBOCYTOPENIA (HCC): ICD-10-CM

## 2024-08-08 DIAGNOSIS — R41.9 UNSP SYMPTOMS AND SIGNS W COGNITIVE FUNCTIONS AND AWARENESS: ICD-10-CM

## 2024-08-08 DIAGNOSIS — Z12.11 SCREEN FOR COLON CANCER: ICD-10-CM

## 2024-08-08 DIAGNOSIS — I42.8 VALVULAR CARDIOMYOPATHY (HCC): ICD-10-CM

## 2024-08-08 DIAGNOSIS — I48.20 ATRIAL FIBRILLATION, CHRONIC (HCC): ICD-10-CM

## 2024-08-08 DIAGNOSIS — E53.8 VITAMIN B12 DEFICIENCY: ICD-10-CM

## 2024-08-08 DIAGNOSIS — N40.0 ENLARGED PROSTATE: ICD-10-CM

## 2024-08-08 LAB
ALBUMIN SERPL-MCNC: 4.3 G/DL (ref 3.5–5)
ALBUMIN/GLOB SERPL: 1.3 (ref 1.1–2.2)
ALP SERPL-CCNC: 131 U/L (ref 45–117)
ALT SERPL-CCNC: 29 U/L (ref 12–78)
ANION GAP SERPL CALC-SCNC: 4 MMOL/L (ref 5–15)
AST SERPL-CCNC: 30 U/L (ref 15–37)
BILIRUB SERPL-MCNC: 1.1 MG/DL (ref 0.2–1)
BUN SERPL-MCNC: 21 MG/DL (ref 6–20)
BUN/CREAT SERPL: 20 (ref 12–20)
CALCIUM SERPL-MCNC: 9.5 MG/DL (ref 8.5–10.1)
CHLORIDE SERPL-SCNC: 104 MMOL/L (ref 97–108)
CHOLEST SERPL-MCNC: 166 MG/DL
CO2 SERPL-SCNC: 31 MMOL/L (ref 21–32)
CREAT SERPL-MCNC: 1.03 MG/DL (ref 0.7–1.3)
ERYTHROCYTE [DISTWIDTH] IN BLOOD BY AUTOMATED COUNT: 13.2 % (ref 11.5–14.5)
GLOBULIN SER CALC-MCNC: 3.4 G/DL (ref 2–4)
GLUCOSE SERPL-MCNC: 96 MG/DL (ref 65–100)
HCT VFR BLD AUTO: 40.7 % (ref 36.6–50.3)
HDLC SERPL-MCNC: 62 MG/DL
HDLC SERPL: 2.7 (ref 0–5)
HGB BLD-MCNC: 13.7 G/DL (ref 12.1–17)
LDLC SERPL CALC-MCNC: 87.6 MG/DL (ref 0–100)
MCH RBC QN AUTO: 33.2 PG (ref 26–34)
MCHC RBC AUTO-ENTMCNC: 33.7 G/DL (ref 30–36.5)
MCV RBC AUTO: 98.5 FL (ref 80–99)
NRBC # BLD: 0 K/UL (ref 0–0.01)
NRBC BLD-RTO: 0 PER 100 WBC
PLATELET # BLD AUTO: 167 K/UL (ref 150–400)
PMV BLD AUTO: 11.9 FL (ref 8.9–12.9)
POTASSIUM SERPL-SCNC: 4.2 MMOL/L (ref 3.5–5.1)
PROT SERPL-MCNC: 7.7 G/DL (ref 6.4–8.2)
PSA SERPL-MCNC: 2.2 NG/ML (ref 0.01–4)
RBC # BLD AUTO: 4.13 M/UL (ref 4.1–5.7)
SODIUM SERPL-SCNC: 139 MMOL/L (ref 136–145)
TRIGL SERPL-MCNC: 82 MG/DL
TSH SERPL DL<=0.05 MIU/L-ACNC: 3.28 UIU/ML (ref 0.36–3.74)
VIT B12 SERPL-MCNC: 498 PG/ML (ref 193–986)
VLDLC SERPL CALC-MCNC: 16.4 MG/DL
WBC # BLD AUTO: 5.8 K/UL (ref 4.1–11.1)

## 2024-08-08 PROCEDURE — 99214 OFFICE O/P EST MOD 30 MIN: CPT | Performed by: INTERNAL MEDICINE

## 2024-08-08 RX ORDER — GABAPENTIN 600 MG/1
TABLET ORAL
COMMUNITY
Start: 2024-06-08

## 2024-08-08 ASSESSMENT — LIFESTYLE VARIABLES
HOW OFTEN DURING THE LAST YEAR HAVE YOU HAD A FEELING OF GUILT OR REMORSE AFTER DRINKING: NEVER
HOW OFTEN DURING THE LAST YEAR HAVE YOU FAILED TO DO WHAT WAS NORMALLY EXPECTED FROM YOU BECAUSE OF DRINKING: NEVER
HOW OFTEN DURING THE LAST YEAR HAVE YOU NEEDED AN ALCOHOLIC DRINK FIRST THING IN THE MORNING TO GET YOURSELF GOING AFTER A NIGHT OF HEAVY DRINKING: NEVER
HOW OFTEN DO YOU HAVE A DRINK CONTAINING ALCOHOL: 4 OR MORE TIMES A WEEK
HAS A RELATIVE, FRIEND, DOCTOR, OR ANOTHER HEALTH PROFESSIONAL EXPRESSED CONCERN ABOUT YOUR DRINKING OR SUGGESTED YOU CUT DOWN: NO
HOW OFTEN DURING THE LAST YEAR HAVE YOU FOUND THAT YOU WERE NOT ABLE TO STOP DRINKING ONCE YOU HAD STARTED: NEVER
HOW MANY STANDARD DRINKS CONTAINING ALCOHOL DO YOU HAVE ON A TYPICAL DAY: 1 OR 2
HOW OFTEN DURING THE LAST YEAR HAVE YOU BEEN UNABLE TO REMEMBER WHAT HAPPENED THE NIGHT BEFORE BECAUSE YOU HAD BEEN DRINKING: NEVER
HAVE YOU OR SOMEONE ELSE BEEN INJURED AS A RESULT OF YOUR DRINKING: NO

## 2024-08-08 NOTE — PROGRESS NOTES
Identified pt with two pt identifiers(name and ). Reviewed record in preparation for visit and have obtained necessary documentation. All patient medications has been reviewed.  Chief Complaint   Patient presents with    Medicare AWV       Vitals:    24 1252   BP: 122/80   Pulse: 62   Temp: 98.6 °F (37 °C)   SpO2: 97%                   Coordination of Care Questionnaire:   1) Have you been to an emergency room, urgent care, or hospitalized since your last visit?   no    2. Have seen or consulted any other health care provider since your last visit? no

## 2024-08-08 NOTE — PROGRESS NOTES
Medicare Annual Wellness Visit    Clement Duran is here for Medicare AWV    Clement was seen today for medicare awv.    Diagnoses and all orders for this visit:    Medicare annual wellness visit, subsequent  Recommend seasonal influenza COVID-19 booster this fall.  He is already received RSV vaccine.    History of colon polyps  -     AFL - Konstantin Duvall MD, Gastroenterology, [place] ([facility])  Screen for colon cancer  Referred for screening colonoscopy.    Hypercholesterolemia  Unclear status on no current medical.  -     Lipid Panel; Future  -     Comprehensive Metabolic Panel; Future    Atrial fibrillation, chronic (HCC)  This condition appears to be stable and is being evaluated and managed by his specialist Dr. Harris.  Continue Eliquis..   No acute findings today warrant change in management plan.     -     Comprehensive Metabolic Panel; Future    Thrombocytopenia (HCC) \"  Likely mildly stable.  Check CBC.  No bleeding or bruising.  On Eliquis.  Possibly resolved.  -     CBC; Future    Valvular cardiomyopathy (HCC)  Currently asymptomatic.  Check metabolic panel.  Seeing cardiology.  Ejection fraction of 45 to 50% in November 2023.  Moderate regurgitation of tricuspid and aortic valve and pulmonic valve.  History of mitral repair and still has mild-moderate regurgitation.  Taking no specific medication for this condition  -     Comprehensive Metabolic Panel; Future    Unsp symptoms and signs w cognitive functions and awareness  Mild cognitive issues at times.  Last B12 injection was about 3 weeks ago.  His level should be normal or high.  Check thyroid function.  -     Vitamin B12; Future  -     TSH; Future    Enlarged prostate  Screening for prostate cancer  We will check PSA per patient request.  -     PSA Screening; Future    Vitamin B12 deficiency  Continue vitamin B12 injections monthly.  Supplement starting.        Recommendations for Preventive Services Due: see orders and patient

## 2024-08-11 DIAGNOSIS — R74.8 ALKALINE PHOSPHATASE ELEVATION: Primary | ICD-10-CM

## 2024-09-19 ENCOUNTER — HOSPITAL ENCOUNTER (OUTPATIENT)
Facility: HOSPITAL | Age: 79
Setting detail: RECURRING SERIES
Discharge: HOME | End: 2024-09-22
Payer: MEDICARE

## 2024-09-19 PROCEDURE — 97162 PT EVAL MOD COMPLEX 30 MIN: CPT | Performed by: PHYSICAL THERAPIST

## 2024-09-19 PROCEDURE — 97112 NEUROMUSCULAR REEDUCATION: CPT | Performed by: PHYSICAL THERAPIST

## 2024-09-19 PROCEDURE — 97110 THERAPEUTIC EXERCISES: CPT | Performed by: PHYSICAL THERAPIST

## 2024-09-24 ENCOUNTER — HOSPITAL ENCOUNTER (OUTPATIENT)
Facility: HOSPITAL | Age: 79
Setting detail: RECURRING SERIES
Discharge: HOME | End: 2024-09-27
Payer: MEDICARE

## 2024-09-24 PROCEDURE — 97112 NEUROMUSCULAR REEDUCATION: CPT

## 2024-09-24 PROCEDURE — 97110 THERAPEUTIC EXERCISES: CPT

## 2024-10-02 ENCOUNTER — HOSPITAL ENCOUNTER (OUTPATIENT)
Facility: HOSPITAL | Age: 79
Setting detail: RECURRING SERIES
Discharge: HOME | End: 2024-10-05
Payer: MEDICARE

## 2024-10-02 PROCEDURE — 97110 THERAPEUTIC EXERCISES: CPT | Performed by: PHYSICAL THERAPIST

## 2024-10-02 PROCEDURE — 97112 NEUROMUSCULAR REEDUCATION: CPT | Performed by: PHYSICAL THERAPIST

## 2024-10-02 NOTE — PROGRESS NOTES
PHYSICAL THERAPY - MEDICARE DAILY TREATMENT NOTE (updated 3/23)      Date: 10/2/2024          Patient Name:  Clement Duran :  1945   Medical   Diagnosis:  Other low back pain [M54.59] Treatment Diagnosis:  M54.59  OTHER LOWER BACK PAIN    Referral Source:  Rik Gurrola MD Insurance:   Payor: MEDICARE / Plan: MEDICARE PART A AND B / Product Type: *No Product type* /                     Patient  verified YES    Visit #   Current  / Total 3 24   Time   In / Out 9:15a 10:00a   Total Treatment Time 45   Total Timed Codes 45   1:1 Treatment Time 45      [facility] Totals Reminder:  bill using total billable   min of TIMED therapeutic procedures and modalities.   8-22 min = 1 unit; 23-37 min = 2 units; 38-52 min = 3 units; 53-67 min = 4 units; 68-82 min = 5 units            SUBJECTIVE    Pain Level (0-10 scale): 0    Any medication changes, allergies to medications, adverse drug reactions, diagnosis change, or new procedure performed?: [x] No    [] Yes (see summary sheet for update)  Medications: Verified on Patient Summary List    Subjective functional status/changes:     Patient feels like he can feels his abs better, especially with the balance exercises.    OBJECTIVE      Therapeutic Procedures:  Tx Min Billable or 1:1 Min (if diff from Tx Min) Procedure, Rationale, Specifics   30  43838 Therapeutic Exercise (timed):  increase ROM, strength, coordination, balance, and proprioception to improve patient's ability to progress to PLOF and address remaining functional goals. (see flow sheet as applicable)     Details if applicable:     15  93929 Neuromuscular Re-Education (timed):  improve balance, coordination, kinesthetic sense, posture, core stability and proprioception to improve patient's ability to develop conscious control of individual muscles and awareness of position of extremities in order to progress to PLOF and address remaining functional goals. (see flow sheet as applicable)     Details if applicable:

## 2024-10-04 ENCOUNTER — HOSPITAL ENCOUNTER (OUTPATIENT)
Facility: HOSPITAL | Age: 79
Setting detail: RECURRING SERIES
Discharge: HOME | End: 2024-10-07
Payer: MEDICARE

## 2024-10-04 PROCEDURE — 97112 NEUROMUSCULAR REEDUCATION: CPT

## 2024-10-04 PROCEDURE — 97110 THERAPEUTIC EXERCISES: CPT

## 2024-10-04 NOTE — PROGRESS NOTES
PHYSICAL THERAPY - MEDICARE DAILY TREATMENT NOTE (updated 3/23)      Date: 10/4/2024          Patient Name:  Clement Duran :  1945   Medical   Diagnosis:  Other low back pain [M54.59] Treatment Diagnosis:  M54.59  OTHER LOWER BACK PAIN    Referral Source:  Rik Gurrola MD Insurance:   Payor: MEDICARE / Plan: MEDICARE PART A AND B / Product Type: *No Product type* /                     Patient  verified YES    Visit #   Current  / Total 4 24   Time   In / Out 10:40 a 11:25 a   Total Treatment Time 45   Total Timed Codes 45   1:1 Treatment Time 45      [facility] Totals Reminder:  bill using total billable   min of TIMED therapeutic procedures and modalities.   8-22 min = 1 unit; 23-37 min = 2 units; 38-52 min = 3 units; 53-67 min = 4 units; 68-82 min = 5 units            SUBJECTIVE    Pain Level (0-10 scale): 0    Any medication changes, allergies to medications, adverse drug reactions, diagnosis change, or new procedure performed?: [x] No    [] Yes (see summary sheet for update)  Medications: Verified on Patient Summary List    Subjective functional status/changes:     Patient reporting improvement in post exercise soreness and increased awareness of his posture at this time. No new changes or complaints at this time.     OBJECTIVE      Therapeutic Procedures:  Tx Min Billable or 1:1 Min (if diff from Tx Min) Procedure, Rationale, Specifics   30  60285 Therapeutic Exercise (timed):  increase ROM, strength, coordination, balance, and proprioception to improve patient's ability to progress to PLOF and address remaining functional goals. (see flow sheet as applicable)     Details if applicable:     15  80615 Neuromuscular Re-Education (timed):  improve balance, coordination, kinesthetic sense, posture, core stability and proprioception to improve patient's ability to develop conscious control of individual muscles and awareness of position of extremities in order to progress to PLOF and address remaining

## 2024-10-09 ENCOUNTER — HOSPITAL ENCOUNTER (OUTPATIENT)
Facility: HOSPITAL | Age: 79
Setting detail: RECURRING SERIES
Discharge: HOME | End: 2024-10-12
Payer: MEDICARE

## 2024-10-09 PROCEDURE — 97110 THERAPEUTIC EXERCISES: CPT | Performed by: PHYSICAL THERAPIST

## 2024-10-09 PROCEDURE — 97112 NEUROMUSCULAR REEDUCATION: CPT | Performed by: PHYSICAL THERAPIST

## 2024-10-09 NOTE — PROGRESS NOTES
PHYSICAL THERAPY - MEDICARE DAILY TREATMENT NOTE (updated 3/23)      Date: 10/9/2024          Patient Name:  Clement Duran :  1945   Medical   Diagnosis:  Other low back pain [M54.59] Treatment Diagnosis:  M54.59  OTHER LOWER BACK PAIN    Referral Source:  Rik Gurrola MD Insurance:   Payor: MEDICARE / Plan: MEDICARE PART A AND B / Product Type: *No Product type* /                     Patient  verified YES    Visit #   Current  / Total 5 24   Time   In / Out 11:45 AM 12:30 PM   Total Treatment Time 45   Total Timed Codes 45   1:1 Treatment Time 45      [facility] Totals Reminder:  bill using total billable   min of TIMED therapeutic procedures and modalities.   8-22 min = 1 unit; 23-37 min = 2 units; 38-52 min = 3 units; 53-67 min = 4 units; 68-82 min = 5 units            SUBJECTIVE    Pain Level (0-10 scale): 0    Any medication changes, allergies to medications, adverse drug reactions, diagnosis change, or new procedure performed?: [x] No    [] Yes (see summary sheet for update)  Medications: Verified on Patient Summary List    Subjective functional status/changes:     No significant changes from his last visit.    OBJECTIVE      Therapeutic Procedures:  Tx Min Billable or 1:1 Min (if diff from Tx Min) Procedure, Rationale, Specifics   30  87345 Therapeutic Exercise (timed):  increase ROM, strength, coordination, balance, and proprioception to improve patient's ability to progress to PLOF and address remaining functional goals. (see flow sheet as applicable)     Details if applicable:     15  98986 Neuromuscular Re-Education (timed):  improve balance, coordination, kinesthetic sense, posture, core stability and proprioception to improve patient's ability to develop conscious control of individual muscles and awareness of position of extremities in order to progress to PLOF and address remaining functional goals. (see flow sheet as applicable)     Details if applicable:     45     Total Total       [x]

## 2024-10-11 ENCOUNTER — HOSPITAL ENCOUNTER (OUTPATIENT)
Facility: HOSPITAL | Age: 79
Setting detail: RECURRING SERIES
Discharge: HOME | End: 2024-10-14
Payer: MEDICARE

## 2024-10-11 PROCEDURE — 97110 THERAPEUTIC EXERCISES: CPT

## 2024-10-11 NOTE — PROGRESS NOTES
[x]  Patient Education billed concurrently with other procedures   [x] Review HEP    [] Progressed/Changed HEP, detail:    [] Other detail:         Other Objective/Functional Measures  Mod fatigue at the end of session with new there ex    Pain Level at end of session (0-10 scale): 0      Assessment     Patient will continue to benefit from skilled PT / OT services to modify and progress therapeutic interventions, analyze and address functional mobility deficits, analyze and address ROM deficits, analyze and address strength deficits, analyze and address soft tissue restrictions, analyze and cue for proper movement patterns, analyze and modify for postural abnormalities, and analyze and address imbalance/dizziness to address functional deficits and attain remaining goals.    Progress toward goals / Updated goals:  []  See Progress Note/Recertification  Continued progress towards long term goals and will begin discharge planning over the next 1-2 weeks.      PLAN  YES Continue plan of care  Re-Cert Due: 12/11/24  [x]  Upgrade activities as tolerated  []  Discharge due to :  []  Other:      Stevie Ledesma, ANNI       10/11/2024       11:56 AM

## 2024-10-16 ENCOUNTER — HOSPITAL ENCOUNTER (OUTPATIENT)
Facility: HOSPITAL | Age: 79
Setting detail: RECURRING SERIES
Discharge: HOME | End: 2024-10-19
Payer: MEDICARE

## 2024-10-16 ENCOUNTER — PATIENT MESSAGE (OUTPATIENT)
Age: 79
End: 2024-10-16

## 2024-10-16 PROCEDURE — 97112 NEUROMUSCULAR REEDUCATION: CPT | Performed by: PHYSICAL THERAPIST

## 2024-10-16 PROCEDURE — 97110 THERAPEUTIC EXERCISES: CPT | Performed by: PHYSICAL THERAPIST

## 2024-10-16 NOTE — PROGRESS NOTES
PHYSICAL THERAPY - MEDICARE DAILY TREATMENT NOTE (updated 3/23)      Date: 10/16/2024          Patient Name:  Clement Duran :  1945   Medical   Diagnosis:  Other low back pain [M54.59] Treatment Diagnosis:  M54.59  OTHER LOWER BACK PAIN    Referral Source:  Rik Gurrola MD Insurance:   Payor: MEDICARE / Plan: MEDICARE PART A AND B / Product Type: *No Product type* /                     Patient  verified YES    Visit #   Current  / Total 7 24   Time   In / Out 11:00 AM 11:45 AM   Total Treatment Time 45   Total Timed Codes 45   1:1 Treatment Time 45      [facility] Totals Reminder:  bill using total billable   min of TIMED therapeutic procedures and modalities.   8-22 min = 1 unit; 23-37 min = 2 units; 38-52 min = 3 units; 53-67 min = 4 units; 68-82 min = 5 units            SUBJECTIVE    Pain Level (0-10 scale): 0    Any medication changes, allergies to medications, adverse drug reactions, diagnosis change, or new procedure performed?: [x] No    [] Yes (see summary sheet for update)  Medications: Verified on Patient Summary List    Subjective functional status/changes:     Patient is feeling     OBJECTIVE      Therapeutic Procedures:  Tx Min Billable or 1:1 Min (if diff from Tx Min) Procedure, Rationale, Specifics   30  87446 Therapeutic Exercise (timed):  increase ROM, strength, coordination, balance, and proprioception to improve patient's ability to progress to PLOF and address remaining functional goals. (see flow sheet as applicable)     Details if applicable:     15  08294 Neuromuscular Re-Education (timed):  improve balance, coordination, kinesthetic sense, posture, core stability and proprioception to improve patient's ability to develop conscious control of individual muscles and awareness of position of extremities in order to progress to PLOF and address remaining functional goals. (see flow sheet as applicable)     Details if applicable:     45     Total Total       [x]  Patient Education billed

## 2024-10-17 NOTE — PROGRESS NOTES
Physical Therapy at [place],   a part of [facility]  [address], [address]  [place], [place] [address]  Phone: [phone]  Fax: [phone]    DISCHARGE SUMMARY  Patient Name: Clement Duran : 1945   Treatment/Medical Diagnosis: Other low back pain [M54.59]   Referral Source: Rik Gurrola MD     Date of Initial Visit: 24 Attended Visits: 7 Missed Visits: 0     SUMMARY OF TREATMENT    Pt attended initial evaluation and     6     follow-ups and did very well with a POC focused on therapeutic exercises to address lumbopelvic stability following surgery last year. He improved LE strength, endurance, and reduced his overall fall risk.      RECOMMENDATIONS  Discontinue therapy due all goals met. POC will switch to the R shoulder.        Andrae Valdes, PT       10/17/2024       8:51 AM    If you have any questions/comments please contact us directly at [phone].   Thank you for allowing us to assist in the care of your patient.

## 2024-10-18 ENCOUNTER — APPOINTMENT (OUTPATIENT)
Facility: HOSPITAL | Age: 79
End: 2024-10-18
Payer: MEDICARE

## 2024-10-23 ENCOUNTER — HOSPITAL ENCOUNTER (OUTPATIENT)
Facility: HOSPITAL | Age: 79
Setting detail: RECURRING SERIES
Discharge: HOME | End: 2024-10-26
Payer: MEDICARE

## 2024-10-23 PROCEDURE — 97162 PT EVAL MOD COMPLEX 30 MIN: CPT | Performed by: PHYSICAL THERAPIST

## 2024-10-23 PROCEDURE — 97110 THERAPEUTIC EXERCISES: CPT | Performed by: PHYSICAL THERAPIST

## 2024-10-23 NOTE — THERAPY EVALUATION
ability to lift 2 pounds overhead with pain level of 2/10 or less to improve ease of putting away dishes in kitchen   Frequency / Duration: Patient to be seen 2 times per week for up to 12 weeks     Patient/ Caregiver education and instruction: Diagnosis, prognosis, self care, activity modification, and exercises   [x]  Plan of care has been reviewed with PTA       Certification Period: 90 days      Andrae Valdes, PT       10/23/2024       10:35 AM        ===================================================================  I certify that the above Therapy Services are being furnished while the patient is under my care. I agree with the treatment plan and certify that this therapy is necessary.    Physician's Signature:_________________________   DATE:_________   TIME:________                           Rik Gurrola MD    ** Signature, Date and Time must be completed for valid certification **  Please sign and fax to [phone].  Thank you

## 2024-10-25 ENCOUNTER — HOSPITAL ENCOUNTER (OUTPATIENT)
Facility: HOSPITAL | Age: 79
Setting detail: RECURRING SERIES
Discharge: HOME | End: 2024-10-28
Payer: MEDICARE

## 2024-10-25 PROCEDURE — 97110 THERAPEUTIC EXERCISES: CPT

## 2024-10-25 PROCEDURE — 97140 MANUAL THERAPY 1/> REGIONS: CPT

## 2024-10-25 NOTE — PROGRESS NOTES
PHYSICAL THERAPY - MEDICARE DAILY TREATMENT NOTE (updated 3/23)      Date: 10/25/2024          Patient Name:  Clement Duran :  1945   Medical   Diagnosis:  Right shoulder pain [M25.511] Treatment Diagnosis:  M25.511  RIGHT SHOULDER PAIN    Referral Source:  Rik Gurrola MD Insurance:   Payor: MEDICARE / Plan: MEDICARE PART A AND B / Product Type: *No Product type* /                     Patient  verified yes     Visit #   Current  / Total 2 24   Time   In / Out 10:30 am 11:25 am   Total Treatment Time 55   Total Timed Codes 45   1:1 Treatment Time 45      [facility] Totals Reminder:  bill using total billable   min of TIMED therapeutic procedures and modalities.   8-22 min = 1 unit; 23-37 min = 2 units; 38-52 min = 3 units; 53-67 min = 4 units; 68-82 min = 5 units            SUBJECTIVE    Pain Level (0-10 scale): 3 \"sore\"    Any medication changes, allergies to medications, adverse drug reactions, diagnosis change, or new procedure performed?: [x] No    [] Yes (see summary sheet for update)  Medications: Verified on Patient Summary List    Subjective functional status/changes:     Pt reporting he was sore following his last visit and that his soreness is still present today. Pt noting the soreness is slowly decreasing.     OBJECTIVE      Therapeutic Procedures:  Tx Min Billable or 1:1 Min (if diff from Tx Min) Procedure, Rationale, Specifics   30  35726 Therapeutic Exercise (timed):  increase ROM, strength, coordination, balance, and proprioception to improve patient's ability to progress to PLOF and address remaining functional goals. (see flow sheet as applicable)     Details if applicable:     10  79029 Manual Therapy (timed):  decrease pain, increase ROM, and increase tissue extensibility to improve patient's ability to progress to PLOF and address remaining functional goals.  The manual therapy interventions were performed at a separate and distinct time from the therapeutic activities interventions .

## 2024-10-29 ENCOUNTER — APPOINTMENT (OUTPATIENT)
Facility: HOSPITAL | Age: 79
End: 2024-10-29
Payer: MEDICARE

## 2024-10-31 ENCOUNTER — HOSPITAL ENCOUNTER (OUTPATIENT)
Facility: HOSPITAL | Age: 79
Setting detail: RECURRING SERIES
Discharge: HOME | End: 2024-11-03
Payer: MEDICARE

## 2024-10-31 PROCEDURE — 97016 VASOPNEUMATIC DEVICE THERAPY: CPT | Performed by: PHYSICAL THERAPIST

## 2024-10-31 PROCEDURE — 97110 THERAPEUTIC EXERCISES: CPT | Performed by: PHYSICAL THERAPIST

## 2024-11-04 ENCOUNTER — TELEPHONE (OUTPATIENT)
Age: 79
End: 2024-11-04

## 2024-11-04 NOTE — TELEPHONE ENCOUNTER
Patient requested samples of eliquis   Requested  a call back one sent to the pharmacy     Contact Information  [phone]    Independent sitting, transfers, standing and ambulation with good balance using appropriate assistive device and prevent falls.

## 2024-11-05 ENCOUNTER — HOSPITAL ENCOUNTER (OUTPATIENT)
Facility: HOSPITAL | Age: 79
Setting detail: RECURRING SERIES
Discharge: HOME | End: 2024-11-08
Payer: MEDICARE

## 2024-11-05 PROCEDURE — 97140 MANUAL THERAPY 1/> REGIONS: CPT

## 2024-11-05 PROCEDURE — 97110 THERAPEUTIC EXERCISES: CPT

## 2024-11-05 NOTE — PROGRESS NOTES
PHYSICAL THERAPY - MEDICARE DAILY TREATMENT NOTE (updated 3/23)      Date: 2024          Patient Name:  Clement Duran :  1945   Medical   Diagnosis:  Right shoulder pain [M25.511] Treatment Diagnosis:  M25.511  RIGHT SHOULDER PAIN    Referral Source:  Rik Gurrola MD Insurance:   Payor: MEDICARE / Plan: MEDICARE PART A AND B / Product Type: *No Product type* /                     Patient  verified yes     Visit #   Current  / Total 4 24   Time   In / Out 11:00 am 11:55 am   Total Treatment Time 55   Total Timed Codes 45   1:1 Treatment Time 45      [facility] Totals Reminder:  bill using total billable   min of TIMED therapeutic procedures and modalities.   8-22 min = 1 unit; 23-37 min = 2 units; 38-52 min = 3 units; 53-67 min = 4 units; 68-82 min = 5 units            SUBJECTIVE    Pain Level (0-10 scale): 1 at rest / 2-3 with movement    Any medication changes, allergies to medications, adverse drug reactions, diagnosis change, or new procedure performed?: [x] No    [] Yes (see summary sheet for update)  Medications: Verified on Patient Summary List    Subjective functional status/changes:     Pt reporting that the steroid shot has been helping a lot. Pt reporting decreased pain and improved ROM at this time with no new complaints.       OBJECTIVE      Therapeutic Procedures:  Tx Min Billable or 1:1 Min (if diff from Tx Min) Procedure, Rationale, Specifics   30  57626 Therapeutic Exercise (timed):  increase ROM, strength, coordination, balance, and proprioception to improve patient's ability to progress to PLOF and address remaining functional goals. (see flow sheet as applicable)     Details if applicable:     10  14239 Manual Therapy (timed):  decrease pain, increase ROM, and increase tissue extensibility to improve patient's ability to progress to PLOF and address remaining functional goals.  The manual therapy interventions were performed at a separate and distinct time from the therapeutic

## 2024-11-07 ENCOUNTER — APPOINTMENT (OUTPATIENT)
Facility: HOSPITAL | Age: 79
End: 2024-11-07
Payer: MEDICARE

## 2024-11-11 ENCOUNTER — OFFICE VISIT (OUTPATIENT)
Age: 79
End: 2024-11-11
Payer: MEDICARE

## 2024-11-11 ENCOUNTER — ANCILLARY PROCEDURE (OUTPATIENT)
Age: 79
End: 2024-11-11
Payer: MEDICARE

## 2024-11-11 VITALS
HEIGHT: 73 IN | DIASTOLIC BLOOD PRESSURE: 70 MMHG | RESPIRATION RATE: 16 BRPM | SYSTOLIC BLOOD PRESSURE: 120 MMHG | HEART RATE: 66 BPM | BODY MASS INDEX: 23.06 KG/M2 | WEIGHT: 174 LBS | OXYGEN SATURATION: 97 %

## 2024-11-11 VITALS — BODY MASS INDEX: 23.06 KG/M2 | WEIGHT: 174 LBS | HEIGHT: 73 IN

## 2024-11-11 DIAGNOSIS — I42.8 VALVULAR CARDIOMYOPATHY (HCC): ICD-10-CM

## 2024-11-11 DIAGNOSIS — I48.20 ATRIAL FIBRILLATION, CHRONIC (HCC): ICD-10-CM

## 2024-11-11 DIAGNOSIS — I34.1 NONRHEUMATIC MITRAL (VALVE) PROLAPSE: ICD-10-CM

## 2024-11-11 DIAGNOSIS — E78.00 HYPERCHOLESTEREMIA: ICD-10-CM

## 2024-11-11 DIAGNOSIS — I34.0 NON-RHEUMATIC MITRAL REGURGITATION: ICD-10-CM

## 2024-11-11 DIAGNOSIS — Z98.890 S/P MITRAL VALVE REPAIR: ICD-10-CM

## 2024-11-11 DIAGNOSIS — I50.22 CHRONIC HEART FAILURE WITH MILDLY REDUCED EJECTION FRACTION (HFMREF, 41-49%) (HCC): ICD-10-CM

## 2024-11-11 DIAGNOSIS — I48.19 PERSISTENT ATRIAL FIBRILLATION (HCC): Primary | ICD-10-CM

## 2024-11-11 PROCEDURE — 93005 ELECTROCARDIOGRAM TRACING: CPT | Performed by: SPECIALIST

## 2024-11-11 PROCEDURE — 99214 OFFICE O/P EST MOD 30 MIN: CPT | Performed by: SPECIALIST

## 2024-11-11 PROCEDURE — 93306 TTE W/DOPPLER COMPLETE: CPT | Performed by: SPECIALIST

## 2024-11-11 ASSESSMENT — PATIENT HEALTH QUESTIONNAIRE - PHQ9
SUM OF ALL RESPONSES TO PHQ QUESTIONS 1-9: 0
SUM OF ALL RESPONSES TO PHQ QUESTIONS 1-9: 0
1. LITTLE INTEREST OR PLEASURE IN DOING THINGS: NOT AT ALL
SUM OF ALL RESPONSES TO PHQ9 QUESTIONS 1 & 2: 0
2. FEELING DOWN, DEPRESSED OR HOPELESS: NOT AT ALL
SUM OF ALL RESPONSES TO PHQ QUESTIONS 1-9: 0
SUM OF ALL RESPONSES TO PHQ QUESTIONS 1-9: 0

## 2024-11-11 NOTE — PROGRESS NOTES
Clement Duran     1945       Emilio Harris MD, [facility]  Date of Visit-11/11/2024   PCP is Pollo Steve MD   [facility] [place]  [facility]  HPI:  Clement Duran is a 79 y.o. male     1 year follow-up   chronic A-fib with previous mitral valve repair.  CHF with mild reduction in LV function      Today  Had echo with mild reduction in EF    Clement returns generally doing well.  He is got some leg cramps.  He takes the gabapentin get some fatigue the next day wants to be can take an energy drink.  He is not having chest pain chest pressure usually get a chest tightness about 15 minutes long every 6 weeks or so is not it with exertion, but rest feels like a muscle spasm.  He has occasional dizziness but no passing out.  He had injections with steroids the shoulder and felt a reaction 24 hours later but now back to normal.  He had blood work with Dr. Steve including lipids chemistry and CBC which are reviewed from August.  He has a 2/6 murmur.  His EKG today shows atrial flutter at a rate of 66 with a left axis deviation and an isolated PVC.    Pression chronic atrial fibrillation doing well has HFmEF without overall decompensation of heart failure.  Currently on medications doing well.  I will see him back in 1 year with repeat echo because if we went below 40 that would change our plans.  His mitral valve repair is well intact.  His murmur is from his previous mitral valve disease.***            Atrial fibrillation chronic   Prior mitral valve repair. s/p surgery on 04/15/13; MEREDITH 3/05/13=  mitral  leaflets with severe posterior mitral valve prolapse and probably flail middle scallop with moderate to severe eccentric MR and moderate TR. mod LAE  Mitral valve repair with a #32 [employer] with a right quadrangular repair of posterior leaflet.    Valvular cardiomyopathy Echo 08/06/13, EF of 45%,   Holter monitor showed frequent PVCs 03/07/16  Nuclear stress test 7/24/19,  above ; Resp-denies wheezing  or productive cough,. Const- No unusual weight loss or fever; Neuro-no recent seizure or CVA ; GI- No BRBPR, abdom pain, bloating ; - no  hematuria   see supplement sheet, initialed and to be scanned by staff    Past Medical History:   Diagnosis Date    ADHD (attention deficit hyperactivity disorder)     Atrial fibrillation (HCC) 07/29/2013    Dr. Harris.  Holter 12/9/13    Cervical spondylosis 11/2023    DDD (degenerative disc disease), lumbar     Dr. Gurrola.  fusion 1984, laminectomy 2023    Diverticulosis     ED (erectile dysfunction) 04/08/2014    Enlarged prostate 2014    psa 1.02 9/2017    Grief reaction     son was murdered 2017    History of mitral valve repair 2013    echo 3/2019 stable.  EF 50-55% mildly increased valvue pressure 11/2017    History of obstructive sleep apnea     states resolved after mitral replacement 2013    Hypercholesterolemia 04/08/2014    Hyperplastic colon polyp 2013    Idiopathic peripheral neuropathy     did not tolerate gabapentin, lyrica, cymbalta.  tried Nutrix    Long term (current) use of anticoagulants     Plantar fasciitis     Retinal disease     Dr. Thao. hx laser tx    Vitamin B12 deficiency     injections      Social Hx= reports that he has never smoked. He has never used smokeless tobacco. He reports current alcohol use of about 7.0 standard drinks of alcohol per week. He reports that he does not use drugs.   Family Hx- family history is not on file.   Allergies   Allergen Reactions    Beta Adrenergic Blockers Nausea And Vomiting        Exam and Labs:  /70   Pulse 66   Resp 16   Ht 1.854 m (6' 1\")   Wt 78.9 kg (174 lb)   SpO2 97%   BMI 22.96 kg/m²    @Constitutional:  NAD, comfortable  Head: NC,AT. Eyes: No scleral icterus. Neck:  Neck supple. No JVD present.Throat: moist mucous membranes.  Chest: Effort normal & normal respiratory excursion .Neurological: alert, conversant and oriented . Skin: Skin is not cold. No

## 2024-11-12 ENCOUNTER — HOSPITAL ENCOUNTER (OUTPATIENT)
Facility: HOSPITAL | Age: 79
Setting detail: RECURRING SERIES
Discharge: HOME | End: 2024-11-15
Payer: MEDICARE

## 2024-11-12 PROCEDURE — 97110 THERAPEUTIC EXERCISES: CPT | Performed by: PHYSICAL THERAPIST

## 2024-11-12 NOTE — PROGRESS NOTES
PHYSICAL THERAPY - MEDICARE DAILY TREATMENT NOTE (updated 3/23)      Date: 2024          Patient Name:  Clement Duran :  1945   Medical   Diagnosis:  Right shoulder pain [M25.511] Treatment Diagnosis:  M25.511  RIGHT SHOULDER PAIN    Referral Source:  Rik Gurrola MD Insurance:   Payor: MEDICARE / Plan: MEDICARE PART A AND B / Product Type: *No Product type* /                     Patient  verified yes     Visit #   Current  / Total 5 24   Time   In / Out 1:40 PM 2:20 PM   Total Treatment Time 40   Total Timed Codes 40   1:1 Treatment Time 40      [facility] Totals Reminder:  bill using total billable   min of TIMED therapeutic procedures and modalities.   8-22 min = 1 unit; 23-37 min = 2 units; 38-52 min = 3 units; 53-67 min = 4 units; 68-82 min = 5 units            SUBJECTIVE    Pain Level (0-10 scale): 1 at rest / 2-3 with movement    Any medication changes, allergies to medications, adverse drug reactions, diagnosis change, or new procedure performed?: [x] No    [] Yes (see summary sheet for update)  Medications: Verified on Patient Summary List    Subjective functional status/changes:     Patient changed the blades on his mower, so his R shoulder is sore. However, he is doing much better compared to about 1 month ago.    OBJECTIVE      Therapeutic Procedures:  Tx Min Billable or 1:1 Min (if diff from Tx Min) Procedure, Rationale, Specifics   40  52119 Therapeutic Exercise (timed):  increase ROM, strength, coordination, balance, and proprioception to improve patient's ability to progress to PLOF and address remaining functional goals. (see flow sheet as applicable)     Details if applicable:       65213 Manual Therapy (timed):  decrease pain, increase ROM, and increase tissue extensibility to improve patient's ability to progress to PLOF and address remaining functional goals.  The manual therapy interventions were performed at a separate and distinct time from the therapeutic activities

## 2024-11-14 ENCOUNTER — HOSPITAL ENCOUNTER (OUTPATIENT)
Facility: HOSPITAL | Age: 79
Setting detail: RECURRING SERIES
Discharge: HOME | End: 2024-11-17
Payer: MEDICARE

## 2024-11-14 PROCEDURE — 97110 THERAPEUTIC EXERCISES: CPT

## 2024-11-14 NOTE — PROGRESS NOTES
interventions . (see flow sheet as applicable)     Details if applicable:  Grade 2-3 inf./post. GH jt mobs, Grade 2-3 inf./post. GH jt mobs with passive ER to tolerance         Details if applicable:           Details if applicable:            Details if applicable:     45     Total Total     Modalities Rationale:     decrease inflammation and decrease pain to improve patient's ability to progress to PLOF and address remaining functional goals.       min [] Estim Unattended,             type/location:       []  w/ice    []  w/heat        min [] Estim Attended,             type/location:       []  w/ice   []  w/heat         []  w/US   []  TENS insruct            min []  Mechanical Traction,        type/lbs:        []  pro      []  sup           []  int       []  cont            []  before manual           []  after manual     min []  Ultrasound,         settings/location:      min  unbilled []  Ice     []  Heat            location/position: R shoulder / Supine         min [x]  Vasopneumatic Device,      press/temp: mod / 34     *unbilled        min []  Other:      Skin assessment post-treatment (if applicable):    [x]  intact    []  redness- no adverse reaction                 []redness - adverse reaction:          [x]  Patient Education billed concurrently with other procedures   [x] Review HEP    [] Progressed/Changed HEP, detail:    [] Other detail:         Other Objective/Functional Measures  No increase in pain with isometric RTC strengthening, mod fatigue reported, may add to HEP depending on pts response  Scaption wall slides without pain    Pain Level at end of session (0-10 scale): 0      Assessment   Patient will continue to benefit from skilled PT / OT services to modify and progress therapeutic interventions, analyze and address functional mobility deficits, analyze and address ROM deficits, analyze and address strength deficits, analyze and address soft tissue restrictions, analyze and cue for proper

## 2024-11-18 LAB
ECHO AO ASC DIAM: 3.7 CM
ECHO AO ASCENDING AORTA INDEX: 1.82 CM/M2
ECHO AO ROOT DIAM: 3.6 CM
ECHO AO ROOT INDEX: 1.77 CM/M2
ECHO AR MAX VEL PISA: 4.6 M/S
ECHO AV MEAN GRADIENT: 4 MMHG
ECHO AV MEAN VELOCITY: 1 M/S
ECHO AV PEAK GRADIENT: 8 MMHG
ECHO AV PEAK VELOCITY: 1.4 M/S
ECHO AV REGURGITANT PHT: 662 MILLISECOND
ECHO AV VELOCITY RATIO: 0.57
ECHO AV VTI: 24.6 CM
ECHO BSA: 2.02 M2
ECHO EST RA PRESSURE: 8 MMHG
ECHO LA DIAMETER INDEX: 2.86 CM/M2
ECHO LA DIAMETER: 5.8 CM
ECHO LA TO AORTIC ROOT RATIO: 1.61
ECHO LA VOL A-L A2C: 221 ML (ref 18–58)
ECHO LA VOL A-L A4C: 234 ML (ref 18–58)
ECHO LA VOL BP: 209 ML (ref 18–58)
ECHO LA VOL MOD A2C: 202 ML (ref 18–58)
ECHO LA VOL MOD A4C: 206 ML (ref 18–58)
ECHO LA VOL/BSA BIPLANE: 103 ML/M2 (ref 16–34)
ECHO LA VOLUME AREA LENGTH: 234 ML
ECHO LA VOLUME INDEX A-L A2C: 109 ML/M2 (ref 16–34)
ECHO LA VOLUME INDEX A-L A4C: 115 ML/M2 (ref 16–34)
ECHO LA VOLUME INDEX AREA LENGTH: 115 ML/M2 (ref 16–34)
ECHO LA VOLUME INDEX MOD A2C: 100 ML/M2 (ref 16–34)
ECHO LA VOLUME INDEX MOD A4C: 101 ML/M2 (ref 16–34)
ECHO LV E' LATERAL VELOCITY: 14.8 CM/S
ECHO LV E' SEPTAL VELOCITY: 8 CM/S
ECHO LV EDV A2C: 125 ML
ECHO LV EDV A4C: 137 ML
ECHO LV EDV BP: 134 ML (ref 67–155)
ECHO LV EDV INDEX A4C: 67 ML/M2
ECHO LV EDV INDEX BP: 66 ML/M2
ECHO LV EDV NDEX A2C: 62 ML/M2
ECHO LV EJECTION FRACTION A2C: 56 %
ECHO LV EJECTION FRACTION A4C: 44 %
ECHO LV EJECTION FRACTION BIPLANE: 48 % (ref 55–100)
ECHO LV ESV A2C: 56 ML
ECHO LV ESV A4C: 77 ML
ECHO LV ESV BP: 70 ML (ref 22–58)
ECHO LV ESV INDEX A2C: 28 ML/M2
ECHO LV ESV INDEX A4C: 38 ML/M2
ECHO LV ESV INDEX BP: 34 ML/M2
ECHO LV FRACTIONAL SHORTENING: 20 % (ref 28–44)
ECHO LV INTERNAL DIMENSION DIASTOLE INDEX: 2.71 CM/M2
ECHO LV INTERNAL DIMENSION DIASTOLIC: 5.5 CM (ref 4.2–5.9)
ECHO LV INTERNAL DIMENSION SYSTOLIC INDEX: 2.17 CM/M2
ECHO LV INTERNAL DIMENSION SYSTOLIC: 4.4 CM
ECHO LV IVSD: 1.5 CM (ref 0.6–1)
ECHO LV MASS 2D: 288.2 G (ref 88–224)
ECHO LV MASS INDEX 2D: 142 G/M2 (ref 49–115)
ECHO LV POSTERIOR WALL DIASTOLIC: 1 CM (ref 0.6–1)
ECHO LV RELATIVE WALL THICKNESS RATIO: 0.36
ECHO LVOT AV VTI INDEX: 0.59
ECHO LVOT MEAN GRADIENT: 1 MMHG
ECHO LVOT PEAK GRADIENT: 3 MMHG
ECHO LVOT PEAK VELOCITY: 0.8 M/S
ECHO LVOT VTI: 14.4 CM
ECHO MV AREA PHT: 2.1 CM2
ECHO MV E DECELERATION TIME (DT): 408.2 MS
ECHO MV E VELOCITY: 1.41 M/S
ECHO MV E/E' LATERAL: 9.53
ECHO MV E/E' RATIO (AVERAGED): 13.58
ECHO MV E/E' SEPTAL: 17.63
ECHO MV LVOT VTI INDEX: 2.72
ECHO MV MAX VELOCITY: 1.7 M/S
ECHO MV MEAN GRADIENT: 4 MMHG
ECHO MV MEAN VELOCITY: 0.9 M/S
ECHO MV PEAK GRADIENT: 11 MMHG
ECHO MV PRESSURE HALF TIME (PHT): 104.9 MS
ECHO MV VTI: 39.1 CM
ECHO RA AREA 4C: 50.4 CM2
ECHO RA END SYSTOLIC VOLUME APICAL 4 CHAMBER INDEX BSA: 106 ML/M2
ECHO RA VOLUME AREA LENGTH APICAL 4 CHAMBER: 227 ML
ECHO RA VOLUME: 216 ML
ECHO RIGHT VENTRICULAR SYSTOLIC PRESSURE (RVSP): 37 MMHG
ECHO RV BASAL DIMENSION: 4.9 CM
ECHO RV FREE WALL PEAK S': 10.1 CM/S
ECHO RV TAPSE: 1.8 CM (ref 1.7–?)
ECHO TV REGURGITANT MAX VELOCITY: 2.71 M/S
ECHO TV REGURGITANT PEAK GRADIENT: 29 MMHG

## 2024-11-19 ENCOUNTER — HOSPITAL ENCOUNTER (OUTPATIENT)
Facility: HOSPITAL | Age: 79
Setting detail: RECURRING SERIES
Discharge: HOME | End: 2024-11-22
Payer: MEDICARE

## 2024-11-19 PROCEDURE — 97110 THERAPEUTIC EXERCISES: CPT | Performed by: PHYSICAL THERAPIST

## 2024-11-19 PROCEDURE — 97016 VASOPNEUMATIC DEVICE THERAPY: CPT | Performed by: PHYSICAL THERAPIST

## 2024-11-19 NOTE — PROGRESS NOTES
progress with overhead reaching and will continue to focus on long term goals over the next 2 weeks.      PLAN  Yes  Continue plan of care  Re-Cert Due: 10/23/2024 - 1/21/2025  [x]  Upgrade activities as tolerated  []  Discharge due to:  []  Other:      Andrae Valdes, PT       11/19/2024       2:08 PM

## 2024-11-21 ENCOUNTER — APPOINTMENT (OUTPATIENT)
Facility: HOSPITAL | Age: 79
End: 2024-11-21
Payer: MEDICARE

## 2024-11-29 ENCOUNTER — APPOINTMENT (OUTPATIENT)
Facility: HOSPITAL | Age: 79
End: 2024-11-29
Payer: MEDICARE

## 2024-11-29 ENCOUNTER — HOSPITAL ENCOUNTER (EMERGENCY)
Facility: HOSPITAL | Age: 79
Discharge: HOME | End: 2024-11-29
Attending: EMERGENCY MEDICINE
Payer: MEDICARE

## 2024-11-29 VITALS
SYSTOLIC BLOOD PRESSURE: 125 MMHG | HEART RATE: 76 BPM | HEIGHT: 72 IN | WEIGHT: 175 LBS | OXYGEN SATURATION: 98 % | TEMPERATURE: 99.8 F | BODY MASS INDEX: 23.7 KG/M2 | DIASTOLIC BLOOD PRESSURE: 64 MMHG | RESPIRATION RATE: 20 BRPM

## 2024-11-29 DIAGNOSIS — J06.9 URI WITH COUGH AND CONGESTION: Primary | ICD-10-CM

## 2024-11-29 LAB
FLUAV RNA SPEC QL NAA+PROBE: NOT DETECTED
FLUBV RNA SPEC QL NAA+PROBE: NOT DETECTED
SARS-COV-2 RNA RESP QL NAA+PROBE: NOT DETECTED
SOURCE: NORMAL

## 2024-11-29 PROCEDURE — 71046 X-RAY EXAM CHEST 2 VIEWS: CPT

## 2024-11-29 PROCEDURE — 99284 EMERGENCY DEPT VISIT MOD MDM: CPT

## 2024-11-29 PROCEDURE — 87636 SARSCOV2 & INF A&B AMP PRB: CPT

## 2024-11-29 RX ORDER — DEXTROMETHORPHAN HBR, GUAIFENESIN 60; 1200 MG/1; MG/1
1 TABLET, EXTENDED RELEASE ORAL EVERY 12 HOURS PRN
Qty: 28 TABLET | Refills: 0 | Status: SHIPPED | OUTPATIENT
Start: 2024-11-29

## 2024-11-29 RX ORDER — FLUTICASONE PROPIONATE 50 MCG
2 SPRAY, SUSPENSION (ML) NASAL DAILY
Qty: 16 G | Refills: 0 | Status: SHIPPED | OUTPATIENT
Start: 2024-11-29

## 2024-11-29 ASSESSMENT — PAIN DESCRIPTION - LOCATION: LOCATION: HEAD

## 2024-11-29 ASSESSMENT — PAIN SCALES - GENERAL: PAINLEVEL_OUTOF10: 5

## 2024-11-29 ASSESSMENT — LIFESTYLE VARIABLES: HOW OFTEN DO YOU HAVE A DRINK CONTAINING ALCOHOL: NEVER

## 2024-11-29 ASSESSMENT — PAIN DESCRIPTION - ORIENTATION: ORIENTATION: MID

## 2024-11-29 ASSESSMENT — PAIN - FUNCTIONAL ASSESSMENT: PAIN_FUNCTIONAL_ASSESSMENT: PREVENTS OR INTERFERES SOME ACTIVE ACTIVITIES AND ADLS

## 2024-11-29 ASSESSMENT — PAIN DESCRIPTION - DESCRIPTORS: DESCRIPTORS: ACHING

## 2024-11-29 NOTE — ED TRIAGE NOTES
Patient presents ambulatory to treatment area with a steady gait. Patient complains of cough and inability to sleep due to this cough. Also complains of sore throat and body aches and headache. Nasal congestion. Cough is productive of green sputum. Denies known fevers. Symptoms began three days ago.

## 2024-11-30 NOTE — ED PROVIDER NOTES
[facility] EMERGENCY DEPT  EMERGENCY DEPARTMENT ENCOUNTER      Pt Name: Clement Duran  MRN: [id number]  Birthdate 1945  Date of evaluation: 11/29/2024  Provider: Mj Varela MD    CHIEF COMPLAINT       Chief Complaint   Patient presents with    Cough    Generalized Body Aches           HISTORY OF PRESENT ILLNESS    HPI  79 y.o. male presents with cough and congestion for the last 10 day(s). No difficulty breathing. No fever.      Review of External Medical Records:     Nursing Notes were reviewed.    REVIEW OF SYSTEMS       Review of Systems    Except as noted above the remainder of the review of systems was reviewed and negative.       PAST MEDICAL HISTORY     Past Medical History:   Diagnosis Date    ADHD (attention deficit hyperactivity disorder)     Atrial fibrillation (HCC) 07/29/2013    Dr. Harris.  Holter 12/9/13    Cervical spondylosis 11/2023    DDD (degenerative disc disease), lumbar     Dr. Gurrola.  fusion 1984, laminectomy 2023    Diverticulosis     ED (erectile dysfunction) 04/08/2014    Enlarged prostate 2014    psa 1.02 9/2017    Grief reaction     son was murdered 2017    History of mitral valve repair 2013    echo 3/2019 stable.  EF 50-55% mildly increased valvue pressure 11/2017    History of obstructive sleep apnea     states resolved after mitral replacement 2013    Hypercholesterolemia 04/08/2014    Hyperplastic colon polyp 2013    Idiopathic peripheral neuropathy     did not tolerate gabapentin, lyrica, cymbalta.  tried Nutrix    Long term (current) use of anticoagulants     Plantar fasciitis     Retinal disease     Dr. Thao. hx laser tx    Vitamin B12 deficiency     injections         SURGICAL HISTORY       Past Surgical History:   Procedure Laterality Date    CARDIAC CATHETERIZATION  03/05/2013    CARLA Gonsales.  normal coronaries, severe MR    COLONOSCOPY  04/30/2018    2 polyps.  Dr. Nash Jade    COLONOSCOPY  04/03/2013    2 hyperplastic polyps    LUMBAR FUSION  1984    L5-S1  by the Emergency Department Physician who either signs or Co-signs this chart in the absence of a cardiologist.        RADIOLOGY:   Plain radiographic images, CT and ultrasound are visualized and preliminarily interpreted by the emergency physician as documented in clinical course.    Interpretation per the Radiologist below, if available at the time of this note:    XR CHEST (2 VW)   Final Result   Mild cardiomegaly. Clear lungs.         Electronically signed by Williams Julian MD           LABS:  Labs Reviewed   COVID-19 & INFLUENZA COMBO       All other labs were within normal range or not returned as of this dictation.    EMERGENCY DEPARTMENT COURSE and DIFFERENTIAL DIAGNOSIS/MDM:   Vitals:    Vitals:    11/29/24 1245   BP: 125/64   Pulse: 76   Resp: 20   Temp: 99.8 °F (37.7 °C)   TempSrc: Oral   SpO2: 98%   Weight: 79.4 kg (175 lb)   Height: 1.829 m (6')           Medical Decision Making  Amount and/or Complexity of Data Reviewed  Radiology: ordered.    Risk  OTC drugs.        79 y.o. male presents with cough and cold symptoms. No signs of respiratory distress, non-toxic appearing, CTAB, no concern for pneumonia with this clinical picture. No evidence of airspace disease, pneumothorax, mass or effusion on CXR. No indication for targeted antiviral therapy but was considered, outside of the window for tamiflu and covid negative. Provided instructions for supportive care measures. Discharged with likely viral cough which will be self limited in its course.       REASSESSMENT            CONSULTS:  None    PROCEDURES:  Unless otherwise noted below, none     Procedures      FINAL IMPRESSION      1. URI with cough and congestion          DISPOSITION/PLAN   DISPOSITION Decision To Discharge 11/29/2024 02:04:27 PM      PATIENT REFERRED TO:  Pollo Steve MD  [address] [facility]  [address] [place] [address] [phone]    Schedule an appointment as soon as possible for a visit   As needed, for

## 2024-12-02 ENCOUNTER — HOSPITAL ENCOUNTER (OUTPATIENT)
Facility: HOSPITAL | Age: 79
Setting detail: RECURRING SERIES
Discharge: HOME | End: 2024-12-05
Payer: MEDICARE

## 2024-12-02 PROCEDURE — 97110 THERAPEUTIC EXERCISES: CPT | Performed by: PHYSICAL THERAPIST

## 2024-12-02 NOTE — PROGRESS NOTES
PHYSICAL THERAPY - MEDICARE DAILY TREATMENT NOTE (updated 3/23)      Date: 2024          Patient Name:  Clement Duran :  1945   Medical   Diagnosis:  Right shoulder pain [M25.511] Treatment Diagnosis:  M25.511  RIGHT SHOULDER PAIN    Referral Source:  Rik Gurrola MD Insurance:   Payor: MEDICARE / Plan: MEDICARE PART A AND B / Product Type: *No Product type* /                     Patient  verified yes     Visit #   Current  / Total 8 24   Time   In / Out 2:25 PM 3:20 PM   Total Treatment Time 55   Total Timed Codes 45   1:1 Treatment Time 45      [facility] Totals Reminder:  bill using total billable   min of TIMED therapeutic procedures and modalities.   8-22 min = 1 unit; 23-37 min = 2 units; 38-52 min = 3 units; 53-67 min = 4 units; 68-82 min = 5 units            SUBJECTIVE    Pain Level (0-10 scale): stiff    Any medication changes, allergies to medications, adverse drug reactions, diagnosis change, or new procedure performed?: [x] No    [] Yes (see summary sheet for update)  Medications: Verified on Patient Summary List    Subjective functional status/changes:     Patient reports no pain at the moment, only stiffness.    OBJECTIVE      Therapeutic Procedures:  Tx Min Billable or 1:1 Min (if diff from Tx Min) Procedure, Rationale, Specifics   45  48618 Therapeutic Exercise (timed):  increase ROM, strength, coordination, balance, and proprioception to improve patient's ability to progress to PLOF and address remaining functional goals. (see flow sheet as applicable)     Details if applicable:       99017 Manual Therapy (timed):  decrease pain, increase ROM, and increase tissue extensibility to improve patient's ability to progress to PLOF and address remaining functional goals.  The manual therapy interventions were performed at a separate and distinct time from the therapeutic activities interventions . (see flow sheet as applicable)     Details if applicable:  Grade 2-3 inf./post. GH jt mobs, Grade

## 2024-12-05 ENCOUNTER — HOSPITAL ENCOUNTER (OUTPATIENT)
Facility: HOSPITAL | Age: 79
Setting detail: RECURRING SERIES
Discharge: HOME | End: 2024-12-08
Payer: MEDICARE

## 2024-12-05 PROCEDURE — 97016 VASOPNEUMATIC DEVICE THERAPY: CPT | Performed by: PHYSICAL THERAPIST

## 2024-12-05 PROCEDURE — 97110 THERAPEUTIC EXERCISES: CPT | Performed by: PHYSICAL THERAPIST

## 2024-12-05 NOTE — PROGRESS NOTES
PHYSICAL THERAPY - MEDICARE DAILY TREATMENT NOTE (updated 3/23)      Date: 2024          Patient Name:  Clement Duran :  1945   Medical   Diagnosis:  Right shoulder pain [M25.511] Treatment Diagnosis:  M25.511  RIGHT SHOULDER PAIN    Referral Source:  Rik Gurrola MD Insurance:   Payor: MEDICARE / Plan: MEDICARE PART A AND B / Product Type: *No Product type* /                     Patient  verified yes     Visit #   Current  / Total 9 24   Time   In / Out 1:45 PM 2:40 PM   Total Treatment Time 55   Total Timed Codes 45   1:1 Treatment Time 45      [facility] Totals Reminder:  bill using total billable   min of TIMED therapeutic procedures and modalities.   8-22 min = 1 unit; 23-37 min = 2 units; 38-52 min = 3 units; 53-67 min = 4 units; 68-82 min = 5 units            SUBJECTIVE    Pain Level (0-10 scale): stiff    Any medication changes, allergies to medications, adverse drug reactions, diagnosis change, or new procedure performed?: [x] No    [] Yes (see summary sheet for update)  Medications: Verified on Patient Summary List    Subjective functional status/changes:     Patient had to put up ama decorations overhead and his shoulder feels sore at the moment.    OBJECTIVE      Therapeutic Procedures:  Tx Min Billable or 1:1 Min (if diff from Tx Min) Procedure, Rationale, Specifics   45  06629 Therapeutic Exercise (timed):  increase ROM, strength, coordination, balance, and proprioception to improve patient's ability to progress to PLOF and address remaining functional goals. (see flow sheet as applicable)     Details if applicable:       97533 Manual Therapy (timed):  decrease pain, increase ROM, and increase tissue extensibility to improve patient's ability to progress to PLOF and address remaining functional goals.  The manual therapy interventions were performed at a separate and distinct time from the therapeutic activities interventions . (see flow sheet as applicable)     Details if

## 2024-12-09 ENCOUNTER — TELEPHONE (OUTPATIENT)
Age: 79
End: 2024-12-09

## 2024-12-09 NOTE — TELEPHONE ENCOUNTER
Patient requesting a call back because he is requesting samples of eliquis   Contact Information  [phone] (Home Phone)  [phone] (Mobile)

## 2024-12-09 NOTE — TELEPHONE ENCOUNTER
Verified patient with two types of identifiers. Let Tan Roger know we are out of samples at this time. He was appreciative of the call and effort.

## 2024-12-10 ENCOUNTER — HOSPITAL ENCOUNTER (OUTPATIENT)
Facility: HOSPITAL | Age: 79
Setting detail: RECURRING SERIES
Discharge: HOME | End: 2024-12-13
Payer: MEDICARE

## 2024-12-10 PROCEDURE — 97016 VASOPNEUMATIC DEVICE THERAPY: CPT | Performed by: PHYSICAL THERAPIST

## 2024-12-10 PROCEDURE — 97110 THERAPEUTIC EXERCISES: CPT | Performed by: PHYSICAL THERAPIST

## 2024-12-10 NOTE — PROGRESS NOTES
[]  See Progress Note/Recertification  Patient is progressing well towards long term goals and eventual discharge to [facility] only.      PLAN  Yes  Continue plan of care  Re-Cert Due: 10/23/2024 - 1/21/2025  [x]  Upgrade activities as tolerated  []  Discharge due to:  []  Other:      Andrae Valdes, PT       12/10/2024       1:28 PM

## 2024-12-12 ENCOUNTER — HOSPITAL ENCOUNTER (OUTPATIENT)
Facility: HOSPITAL | Age: 79
Setting detail: RECURRING SERIES
Discharge: HOME | End: 2024-12-15
Payer: MEDICARE

## 2024-12-12 PROCEDURE — 97110 THERAPEUTIC EXERCISES: CPT | Performed by: PHYSICAL THERAPIST

## 2024-12-12 PROCEDURE — 97016 VASOPNEUMATIC DEVICE THERAPY: CPT | Performed by: PHYSICAL THERAPIST

## 2024-12-12 NOTE — PROGRESS NOTES
PHYSICAL THERAPY - MEDICARE DAILY TREATMENT NOTE (updated 3/23)      Date: 2024          Patient Name:  Clement Duran :  1945   Medical   Diagnosis:  Right shoulder pain [M25.511] Treatment Diagnosis:  M25.511  RIGHT SHOULDER PAIN    Referral Source:  Rik Gurrola MD Insurance:   Payor: MEDICARE / Plan: MEDICARE PART A AND B / Product Type: *No Product type* /                     Patient  verified yes     Visit #   Current  / Total 9 24   Time   In / Out 1:45 PM 2:40 PM   Total Treatment Time 55   Total Timed Codes 45   1:1 Treatment Time 45      [facility] Totals Reminder:  bill using total billable   min of TIMED therapeutic procedures and modalities.   8-22 min = 1 unit; 23-37 min = 2 units; 38-52 min = 3 units; 53-67 min = 4 units; 68-82 min = 5 units            SUBJECTIVE    Pain Level (0-10 scale): stiff    Any medication changes, allergies to medications, adverse drug reactions, diagnosis change, or new procedure performed?: [x] No    [] Yes (see summary sheet for update)  Medications: Verified on Patient Summary List    Subjective functional status/changes:     Patient is feeling better overall. He will have a visit with the referring physician next week to discuss his progress.    OBJECTIVE      Therapeutic Procedures:  Tx Min Billable or 1:1 Min (if diff from Tx Min) Procedure, Rationale, Specifics   45  84612 Therapeutic Exercise (timed):  increase ROM, strength, coordination, balance, and proprioception to improve patient's ability to progress to PLOF and address remaining functional goals. (see flow sheet as applicable)     Details if applicable:       55706 Manual Therapy (timed):  decrease pain, increase ROM, and increase tissue extensibility to improve patient's ability to progress to PLOF and address remaining functional goals.  The manual therapy interventions were performed at a separate and distinct time from the therapeutic activities interventions . (see flow sheet as applicable)

## 2025-01-03 ENCOUNTER — HOSPITAL ENCOUNTER (OUTPATIENT)
Facility: HOSPITAL | Age: 80
Discharge: HOME | End: 2025-01-03
Attending: FAMILY MEDICINE
Payer: MEDICARE

## 2025-01-03 DIAGNOSIS — M75.101 ROTATOR CUFF SYNDROME OF RIGHT SHOULDER: ICD-10-CM

## 2025-01-03 PROCEDURE — 73221 MRI JOINT UPR EXTREM W/O DYE: CPT

## 2025-01-06 ENCOUNTER — TELEPHONE (OUTPATIENT)
Facility: CLINIC | Age: 80
End: 2025-01-06

## 2025-01-06 DIAGNOSIS — I48.19 PERSISTENT ATRIAL FIBRILLATION (HCC): ICD-10-CM

## 2025-01-06 NOTE — TELEPHONE ENCOUNTER
Medication refill for     apixaban (ELIQUIS) 5 MG TABS tablet    [facility] [id number] - [facility] [address] - P [phone] - F [phone] [[id number]]

## 2025-01-29 ENCOUNTER — HOSPITAL ENCOUNTER (OUTPATIENT)
Facility: HOSPITAL | Age: 80
Discharge: HOME | End: 2025-02-01
Attending: ORTHOPAEDIC SURGERY
Payer: MEDICARE

## 2025-01-29 DIAGNOSIS — M54.12 BRACHIAL NEURITIS: ICD-10-CM

## 2025-01-29 PROCEDURE — 72141 MRI NECK SPINE W/O DYE: CPT

## 2025-05-05 DIAGNOSIS — I48.19 PERSISTENT ATRIAL FIBRILLATION (HCC): ICD-10-CM

## 2025-05-05 RX ORDER — APIXABAN 5 MG/1
5 TABLET, FILM COATED ORAL 2 TIMES DAILY
Qty: 60 TABLET | Refills: 3 | Status: SHIPPED | OUTPATIENT
Start: 2025-05-05

## 2025-05-05 NOTE — TELEPHONE ENCOUNTER
PCP: Pollo Steve MD    Last Appointment: Visit date not found    Future Appointments   Date Time Provider Department Center   11/3/2025 11:00 AM [facility] ECHO 2 [facility]   11/3/2025 11:40 AM Emilio Harris MD [facility]       Requested Prescriptions     Pending Prescriptions Disp Refills    ELIQUIS 5 MG TABS tablet [Pharmacy Med Name: ELIQUIS 5 MG TABLET] 60 tablet 3     Sig: TAKE 1 TABLET BY MOUTH 2 TIMES A DAY     LAST ORDERED: 1/6/25      Madelaine \"[place]\" JADE Miller

## 2025-08-07 ENCOUNTER — HOSPITAL ENCOUNTER (OUTPATIENT)
Facility: HOSPITAL | Age: 80
Setting detail: OUTPATIENT SURGERY
Discharge: HOME | End: 2025-08-07
Attending: INTERNAL MEDICINE | Admitting: INTERNAL MEDICINE
Payer: MEDICARE

## 2025-08-07 ENCOUNTER — ANESTHESIA (OUTPATIENT)
Facility: HOSPITAL | Age: 80
End: 2025-08-07
Payer: MEDICARE

## 2025-08-07 ENCOUNTER — ANESTHESIA EVENT (OUTPATIENT)
Facility: HOSPITAL | Age: 80
End: 2025-08-07
Payer: MEDICARE

## 2025-08-07 VITALS
BODY MASS INDEX: 23.53 KG/M2 | HEART RATE: 68 BPM | OXYGEN SATURATION: 100 % | WEIGHT: 173.72 LBS | SYSTOLIC BLOOD PRESSURE: 126 MMHG | DIASTOLIC BLOOD PRESSURE: 88 MMHG | RESPIRATION RATE: 17 BRPM | HEIGHT: 72 IN | TEMPERATURE: 97.5 F

## 2025-08-07 PROCEDURE — 7100000010 HC PHASE II RECOVERY - FIRST 15 MIN: Performed by: INTERNAL MEDICINE

## 2025-08-07 PROCEDURE — 3600007512: Performed by: INTERNAL MEDICINE

## 2025-08-07 PROCEDURE — 3700000000 HC ANESTHESIA ATTENDED CARE: Performed by: INTERNAL MEDICINE

## 2025-08-07 PROCEDURE — C1889 IMPLANT/INSERT DEVICE, NOC: HCPCS | Performed by: INTERNAL MEDICINE

## 2025-08-07 PROCEDURE — 7100000011 HC PHASE II RECOVERY - ADDTL 15 MIN: Performed by: INTERNAL MEDICINE

## 2025-08-07 PROCEDURE — 3600007502: Performed by: INTERNAL MEDICINE

## 2025-08-07 PROCEDURE — 2580000003 HC RX 258: Performed by: INTERNAL MEDICINE

## 2025-08-07 PROCEDURE — 2709999900 HC NON-CHARGEABLE SUPPLY: Performed by: INTERNAL MEDICINE

## 2025-08-07 PROCEDURE — 3700000001 HC ADD 15 MINUTES (ANESTHESIA): Performed by: INTERNAL MEDICINE

## 2025-08-07 PROCEDURE — 6360000002 HC RX W HCPCS: Performed by: NURSE ANESTHETIST, CERTIFIED REGISTERED

## 2025-08-07 DEVICE — CLIP ENDOSCP 235CM RESOL 360 ORDER UOM IS EACH: Type: IMPLANTABLE DEVICE | Site: CECUM | Status: FUNCTIONAL

## 2025-08-07 RX ORDER — SODIUM CHLORIDE 9 MG/ML
INJECTION, SOLUTION INTRAVENOUS CONTINUOUS
Status: DISCONTINUED | OUTPATIENT
Start: 2025-08-07 | End: 2025-08-07 | Stop reason: HOSPADM

## 2025-08-07 RX ORDER — PROPOFOL 10 MG/ML
INJECTION, EMULSION INTRAVENOUS
Status: DISCONTINUED | OUTPATIENT
Start: 2025-08-07 | End: 2025-08-07 | Stop reason: SDUPTHER

## 2025-08-07 RX ORDER — MELOXICAM 15 MG/1
15 TABLET ORAL DAILY
COMMUNITY
Start: 2024-10-09

## 2025-08-07 RX ADMIN — PROPOFOL 20 MG: 10 INJECTION, EMULSION INTRAVENOUS at 09:18

## 2025-08-07 RX ADMIN — PROPOFOL 30 MG: 10 INJECTION, EMULSION INTRAVENOUS at 09:21

## 2025-08-07 RX ADMIN — PROPOFOL 140 MCG/KG/MIN: 10 INJECTION, EMULSION INTRAVENOUS at 09:14

## 2025-08-07 RX ADMIN — PROPOFOL 50 MG: 10 INJECTION, EMULSION INTRAVENOUS at 09:16

## 2025-08-07 RX ADMIN — SODIUM CHLORIDE: 9 INJECTION, SOLUTION INTRAVENOUS at 09:11

## 2025-08-07 RX ADMIN — LIDOCAINE HYDROCHLORIDE 20 MG: 20 INJECTION, SOLUTION INFILTRATION; PERINEURAL at 09:14

## 2025-08-07 ASSESSMENT — PAIN SCALES - GENERAL
PAINLEVEL_OUTOF10: 0

## 2025-08-07 ASSESSMENT — PAIN - FUNCTIONAL ASSESSMENT: PAIN_FUNCTIONAL_ASSESSMENT: 0-10

## 2025-08-26 RX ORDER — CYANOCOBALAMIN 1000 UG/ML
INJECTION, SOLUTION INTRAMUSCULAR; SUBCUTANEOUS
Qty: 3 ML | Refills: 5 | Status: SHIPPED | OUTPATIENT
Start: 2025-08-26

## 2025-08-30 DIAGNOSIS — I48.19 PERSISTENT ATRIAL FIBRILLATION (HCC): ICD-10-CM

## 2025-08-31 RX ORDER — APIXABAN 5 MG/1
5 TABLET, FILM COATED ORAL 2 TIMES DAILY
Qty: 60 TABLET | Refills: 3 | Status: SHIPPED | OUTPATIENT
Start: 2025-08-31

## (undated) DEVICE — JACKSON TABLE POSITIONER KIT: Brand: MEDLINE INDUSTRIES, INC.

## (undated) DEVICE — MARKER SURG PASS SPHR NDI

## (undated) DEVICE — GLOVE SURG SZ 85 L12IN THK75MIL DK GRN LTX FREE

## (undated) DEVICE — GOWN,SIRUS,NONRNF,SETINSLV,2XL,18/CS: Brand: MEDLINE

## (undated) DEVICE — AGENT HEMOSTATIC SURGIFLOW MATRIX KIT W/THROMBIN

## (undated) DEVICE — SOLUTION IRRIG 1000ML 0.9% SOD CHL USP POUR PLAS BTL

## (undated) DEVICE — SPONGE: SPECIALTY PEANUT XR 100/CS: Brand: MEDICAL ACTION INDUSTRIES

## (undated) DEVICE — AEGIS 1" DISK 4MM HOLE, PEEL OPEN: Brand: MEDLINE

## (undated) DEVICE — 1LYRTR 16FR10ML100%SIL UMS SNP: Brand: MEDLINE INDUSTRIES, INC.

## (undated) DEVICE — 450 ML BOTTLE OF 0.05% CHLORHEXIDINE GLUCONATE IN 99.95% STERILE WATER FOR IRRIGATION, USP AND APPLICATOR.: Brand: IRRISEPT ANTIMICROBIAL WOUND LAVAGE

## (undated) DEVICE — STRAP,POSITIONING,KNEE/BODY,FOAM,4X60": Brand: MEDLINE

## (undated) DEVICE — SPONGE GZ W4XL4IN COT 12 PLY TYP VII WVN C FLD DSGN STERILE

## (undated) DEVICE — MARKER,SKIN,WI/RULER AND LABELS: Brand: MEDLINE

## (undated) DEVICE — SUTURE ABSORBABLE 3-0 PS-1 18 IN UD MONOCRYL + STRATAFIX SXMP1B102

## (undated) DEVICE — ELECTRODE PT RET AD L9FT HI MOIST COND ADH HYDRGEL CORDED

## (undated) DEVICE — AGENT HEMSTAT 3GM OXIDIZED REGENERATED CELOS ABSRB FOR CONT (ORDER MULTIPLES OF 5EA)

## (undated) DEVICE — DRAPE,LAP,CHOLE,W/TROUGHS,STERILE: Brand: MEDLINE

## (undated) DEVICE — Device: Brand: JELCO

## (undated) DEVICE — TOOL MR8-14MH30 MR8 14CM MATCH 3MM: Brand: MIDAS REX MR8

## (undated) DEVICE — TOBRA BONE BASKET- AUTOGRAFT BONE COLLECTION SYSTEM WITH MESH FILTER AND GRAFT COMPRESSOR: Brand: TOBRA BONE BASKET

## (undated) DEVICE — KIT EVAC 0.13IN RECT TB DIA10FR 400CC PVC 3 SPR Y CONN DRN

## (undated) DEVICE — SOLUTION IRRIG 1000ML STRL H2O USP PLAS POUR BTL

## (undated) DEVICE — CANISTER, RIGID, 3000CC: Brand: MEDLINE INDUSTRIES, INC.

## (undated) DEVICE — NEURO SPONGES: Brand: DEROYAL

## (undated) DEVICE — YANKAUER,OPEN TIP,W/O VENT,STERILE: Brand: MEDLINE INDUSTRIES, INC.

## (undated) DEVICE — LIQUIBAND RAPID ADHESIVE 36/CS 0.8ML: Brand: MEDLINE

## (undated) DEVICE — STRIP,CLOSURE,WOUND,MEDI-STRIP,1/2X4: Brand: MEDLINE

## (undated) DEVICE — GLOVE SURG SZ 7 L12IN FNGR THK79MIL GRN LTX FREE

## (undated) DEVICE — ALCOHOL RUBBING ISO 16OZ 70%

## (undated) DEVICE — THE MILL DISPOSABLE - MEDIUM

## (undated) DEVICE — 3M™ TEGADERM™ TRANSPARENT FILM DRESSING FRAME STYLE, 1626, 4 IN X 4-3/4 IN (10 CM X 12 CM), 50/CT 4CT/CASE: Brand: 3M™ TEGADERM™

## (undated) DEVICE — KIT ARMOR C DRP COLLAPSIBLE AND SELF EXP TOP CVR FOR FLUOROSCOPIC

## (undated) DEVICE — Device

## (undated) DEVICE — SUTURE STRATAFIX SYMMETRIC SZ 1 L18IN ABSRB VLT CT1 L36CM SXPP1A404

## (undated) DEVICE — DRESSING ALG W4XL8IN AG FOAM SUPERABSORBENT SIL ANTIMIC

## (undated) DEVICE — SET GRAV CK VLV NEEDLESS ST 3 GANGED 4WAY STPCOCK HI FLO 10

## (undated) DEVICE — TOWEL SURG W17XL27IN STD BLU COT NONFENESTRATED PREWASHED

## (undated) DEVICE — LAMINECTOMY-SFMC: Brand: MEDLINE INDUSTRIES, INC.

## (undated) DEVICE — GLOVE SURG SZ 65 THK91MIL LTX FREE SYN POLYISOPRENE

## (undated) DEVICE — DRAPE,REIN 53X77,STERILE: Brand: MEDLINE

## (undated) DEVICE — STERILE-Z SURGICAL PATIENT COVERS CLEAR POLYETHYLENE STERILE UNIVERSAL FIT 10 PER CASE: Brand: STERILE-Z

## (undated) DEVICE — CLEANER ES TIP W2XL2IN ADH BK RADPQ FOR S STL ELECTRD

## (undated) DEVICE — PREMIUM WET SKIN PREP TRAY: Brand: MEDLINE INDUSTRIES, INC.

## (undated) DEVICE — SPONGE GZ W4XL4IN COT RADPQ HIGHLY ABSRB

## (undated) DEVICE — SUTURE VCRL SZ 2-0 L27IN ABSRB UD L26MM CT-2 1/2 CIR J269H

## (undated) DEVICE — TRAP,MUCUS SPECIMEN, 80CC: Brand: MEDLINE

## (undated) DEVICE — PROBE BALL TIP STIMULATING 25

## (undated) DEVICE — NEEDLE,22GX1.5",REG,BEVEL: Brand: MEDLINE